# Patient Record
Sex: MALE | Race: WHITE | NOT HISPANIC OR LATINO | Employment: STUDENT | URBAN - METROPOLITAN AREA
[De-identification: names, ages, dates, MRNs, and addresses within clinical notes are randomized per-mention and may not be internally consistent; named-entity substitution may affect disease eponyms.]

---
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
## 2020-01-07 ENCOUNTER — OFFICE VISIT (OUTPATIENT)
Dept: PSYCHIATRY | Facility: CLINIC | Age: 20
End: 2020-01-07
Attending: PSYCHIATRY & NEUROLOGY
Payer: COMMERCIAL

## 2020-01-07 DIAGNOSIS — F32.9 MAJOR DEPRESSIVE DISORDER, SINGLE EPISODE: Primary | ICD-10-CM

## 2020-01-10 ENCOUNTER — TELEPHONE (OUTPATIENT)
Dept: PSYCHIATRY | Facility: CLINIC | Age: 20
End: 2020-01-10

## 2020-01-10 NOTE — TELEPHONE ENCOUNTER
Writer called patient's mother (Mona) to check up on current providers that are in place, as well as clarify the assessment process. Mona answered questions about providers. Mona wondered about only scheduling cognitive testing. Writer reiterated the need for a complete assessment and offered a referral to another provider for cognitive testing, which Mona declined.   Mona also expressed concern about length of the assessment process and getting care for the patient as soon as possible. Mona agreed to contact for scheduling the remainder of the assessment appointments after the cognitive testing on 01/17.    DIETER Treviño candidate  First Episode Psychosis- [facility]   Social Work Intern  Direct Phone: [phone]

## 2020-01-17 ENCOUNTER — OFFICE VISIT (OUTPATIENT)
Dept: PSYCHIATRY | Facility: CLINIC | Age: 20
End: 2020-01-17
Attending: PSYCHOLOGIST
Payer: COMMERCIAL

## 2020-01-17 DIAGNOSIS — F29 PSYCHOSIS, UNSPECIFIED PSYCHOSIS TYPE (H): Primary | ICD-10-CM

## 2020-01-20 ASSESSMENT — ANXIETY QUESTIONNAIRES
3. WORRYING TOO MUCH ABOUT DIFFERENT THINGS: NEARLY EVERY DAY
2. NOT BEING ABLE TO STOP OR CONTROL WORRYING: NEARLY EVERY DAY
4. TROUBLE RELAXING: NEARLY EVERY DAY
1. FEELING NERVOUS, ANXIOUS, OR ON EDGE: NEARLY EVERY DAY
7. FEELING AFRAID AS IF SOMETHING AWFUL MIGHT HAPPEN: SEVERAL DAYS
GAD7 TOTAL SCORE: 17
6. BECOMING EASILY ANNOYED OR IRRITABLE: SEVERAL DAYS
5. BEING SO RESTLESS THAT IT IS HARD TO SIT STILL: NEARLY EVERY DAY

## 2020-01-20 ASSESSMENT — PATIENT HEALTH QUESTIONNAIRE - PHQ9: SUM OF ALL RESPONSES TO PHQ QUESTIONS 1-9: 23

## 2020-01-21 ENCOUNTER — TELEPHONE (OUTPATIENT)
Dept: PSYCHIATRY | Facility: CLINIC | Age: 20
End: 2020-01-21

## 2020-01-21 ASSESSMENT — ANXIETY QUESTIONNAIRES: GAD7 TOTAL SCORE: 17

## 2020-01-21 NOTE — PROGRESS NOTES
Patient name: Julian Berg  MRN: [id number]  Date: 01/17/2020  Time: 1:00 pm - 4:30 pm  Location: [facility]  Diagnosis: XX     Reason for Referral: XX     Mental Status:                         General appearance: XX                        Mood: XX                        Cognition: Formally assessed - see below                        Orientation: XX                        Insight: XX                        Memory: Formally assessed - see below                        Psychosis: XX                        Suicidal / Homicidal Thoughts: XX     Interpretation of Findings:   Wechsler Adult Intelligence Scale - Fourth Edition (WAIS-IV):  Mr. Berg was administered the WAIS-IV to assess his overall cognitive functioning. The WAIS-IV yields five composite scores: the Verbal Comprehension index, the Perceptual Reasoning index, the Working Memory index, the Processing Speed index, and Full Scale IQ.  He was alert throughout testing, however multiple times when he found the assessment activities challenging, he mentioned  I am experiencing anxiety right now.  Furthermore, he noted that he  forgotten the answers.  He also asked for breaks when he felt anxious. He was pleasant and cooperative. It is believed that he gave his best effort. He appeared anxious during the assessment. Due to this, the writer does not believe that these results reflect Mr. Berg's true cognitive abilities and should be interpreted with caution.       Mr. Berg achieved a Full-Scale IQ (FSIQ) of 131 (95% confidence interval: 117-125), corresponding to the 92nd percentile and superior range of ability.     On the Verbal Comprehension Index, a measure of verbal reasoning abilities, verbal concept formation, and knowledge acquired from the environment, Mr. Berg achieved a score in the Superior range of ability (VCI composite score of 127; 96th percentile).  Mr. Berg performed in the Average to Above Average range of ability across tasks  that make up this composite. The tasks include the ability to answer general factual inquiries, define words, and draw conceptual similarities between words. Mr. Davilas verbal comprehension abilities are above those of the average, same-aged peers. Based on Mr. Berg's performance on this index compared to other indexes, his verbal comprehension abilities are an area of strength for him.     On the Perceptual Reasoning index, which assesses non-verbal problem solving and spatial planning abilities, Mr. Berg achieved a score in the High Average range of ability (JAKOB standard score of 111, 77th percentile). He performed in the Below Average to Above Average range of ability across tasks that make up this composite. These tasks required him to replicate a series of spatial designs using blocks, complete visual patterns based upon presented information and a task that had him select from multiple pieces to mentally create a desired target shape. Mr. Davilas perceptual reasoning abilities are above those of the average, same-aged peers.      His short-term memory ability was assessed by the Working Memory index, on which he performed in the High Average range of ability (WMI standard score of 119, 90th percentile). Mr. Berg performed in the Above Average range of ability across tasks that make up this composite. Memory ability was assessed using a task which required retention and manipulation of a string of numbers, as well as a relatively basic oral arithmetic exam.  Mr. Davilas working memory abilities are above to those of the average, same-aged peers.           Mr. Berg ability to quickly process basic visual information was measured by the Processing Speed index, on which he performed in the High Average range of ability (composite score of 111, 77th percentile).  He performed in the Average range of ability across tasks that make up this composite. These tasks required him to visually scan and  discriminate information under time constraints.  Mr. Berg processing speed abilities are comparable to those of the average, same-aged peers.      Overall, Mr. Berg's performance on the WAIS-IV fell in the Below Average to Above Average  range of ability as compared to his same-aged peers. An area of strength for Mr. Berg is his verbal comprehension abilities and concern in the area of perceptual reasoning and processing speed.    Wechsler Adult Intelligence Scale-Fourth Edition (WAIS-V):  Index/Subtest Scaled Score Index/Subtest Scaled Score             Verbal Comprehension   Perceptual Reasoning     Similarities 18 Block Design 15   Vocabulary 14 Matrix Reasoning 9   Information 12 Visual Puzzles 12    JAKOB 111   Working Memory   Processing Speed     Digit Span 14 Symbol Search  12   Arithmetic 13 Coding 12                     Full Scale       Note: Scaled scores between 8-12 are considered to be within the average range.  It is important to note that cognitive ability is considered to be one aspect of intelligence (i.e. other aspects may include interpersonal, intrapersonal, kinesthetic, musical ability, etc.); however, this type of intellectual ability is most correlated with academic performance. In addition, such measures of cognitive ability should be considered snapshots of functioning at the current time and may be influenced by development, exposure to learning, and other factors over time.  No test is a perfect measure of abilities, skills, knowledge, or behavior. Confidence intervals represent a range of scores, where if she was tested 100 times, 95 times his score would fall within this range.

## 2020-01-21 NOTE — TELEPHONE ENCOUNTER
"11:00 am  Writer called patients mother (Mona) and left message checking in. Last time writer spoke with Mona, writer agreed to check in with Mona after patient's appointment on 1/17. Writer left name and number in case Mona or Herminio have questions about anything.     11:30 am  Mona Berg called writer back and left a message requesting a return call.    3:35 pm  Writer called Mona. Mona wanted to know when the results of the cognitive testing will be available. She stated that Herminio is anxious and worried about a \"problem with his brain.\" This writer agreed to check in with the provider who conducted the cognitive testing and call Mona back either Wednesday or Friday of this week.    DIETER Treviño candidate  First Episode Psychosis- [facility]   Social Work Intern  Direct Phone: [phone]          "

## 2020-01-21 NOTE — PROGRESS NOTES
"First Episode of Psychosis   [facility]  Diagnostic Assessment  [facility]      Julian Berg MRN# [id number]   Age: 19 year old YOB: 2000     Date of Evaluation: 1/07/20  90 minute evaluation    Contributors to the Assessment   Chart Reviewed.   Interview completed with Julian Berg.  Releases of information signed by Julian for Dr. Claudia Angelo with the [facility].  Consent to communicate signed for Mother and Father (Mona and Nadeem Berg).  Collateral information obtained from Mother.    Chief Complaint   Feeling very anxious and worried, and wanting to know what is happening.    History of Present Illness    Julian Berg is a 19 year old male who prefers the name Herminio & pronouns he, him, his.  Julian presents for evaluation for First Episode of Psychosis, [facility] services for treatment of early psychosis.  Patient attended the session with his mother Mona , who they agreed to have part of the interview with.  Mona did step out for a significant portion of the interview. Discussed limits of confidentiality today and status as a mandated .     Per patient's report:   Mental health symptoms first appeared on about Nov 21st of 2019 in the form of \"spinning and worrying about things, talking to people about my worries. I missed a frat event, I've been thinking about the logistics of the school stuff and trying to make a schedule for next semester, and thinking about how I need a tech GPA and how I don't have the right kind of GPA.\" Herminio states that his symptoms have been consistent in intensity since Nov 21st, but that is has changed - \"I'm depressed now.\"  Herminio wonders if the mental health symptoms he has been experiencing are a result of \"smoking weed, or withdrawal from weed?\"    The patient reports zero lifetime psychiatric hospitalizations, but does have two ED psychiatric ED visits (11/29/19 and 12/11/2019). Patient reported " "hoped for outcomes of our assessment as \"getting rid of my depression and anxiety, and getting my cognitive functioning back.\"    Per Collateral report:    Mona (Herminio's mother) reports that Herminio \"had very intense anxiety stuff for a few weeks and wasn't maintaining relationships.\" Mona is concerned about the intensity of the mental health symptoms and wants to ensure that Herminio gets services as soon as possible, naming the start of the semester as a time line goal for getting Herminio enrolled in services.     Per medical records:    During the First-Episode Psychosis screening with [MELANIEATE SLP on 12/23/19], the following comments were made:   Julian reported he believes he has psychosis based on symptoms; symptoms reported include  memory loss, not eating/no appetite, a visual hallucination of a lamp turning on and off (one-time), irrational decisions/impulsivity, sleeping in hour-long intervals, and states he s having delusions regarding getting into business school. In reference to delusions, Julian reported  I don t know other people who were doing well then convinced themselves they were not doing well.  He denied paranoia, but confirmed being  upset about his regret  regarding school.  Julian feels his situation is very unique and  unrelatable.     According to records from [facility], Herminio was went to the ED on 12/11/2019:   \"Julian Berg is a 19 year old male who is here via parents with police escort as he was acting out and was hitting the window of their car [with his head] while he was feeling overwhelmed and distressed. Patient was at the [employer], trying to cancel a class but was unable to make a decision or accept the change. Patient is presently in his freshman year at the [employer]. He is studying computer science. He is interested in changing majors and wants to try the [employer]. Patient graduated [employer]. He was an excellent student. He has anxiety and was able to manage " "it previously. He was prescribed citalopram for several years. He admits to smoking THC on occasion over the summers but smoked more after graduating. 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 Patient ended up having heightened anxiety 2 weeks ago. He was seen at [facility] when he was feeling incapacitated and went home to stay with mother. Patient was referred for therapy which is to start next week. He saw a psychiatrist who started him on Zoloft which he only took for 2 days. Patient has been staying home with mother past 2 weeks until recently when he returned to living at the . There was talk of withdrawing and he was given a full medical withdrawal. He has been unable to accept withdrawing from school or alter his class. He felt he had to find another way. He got frustrated as it was not working out. He made suicidal threats and was hitting the car window.  Patient has now calmed down and has been in emotional and behavioral control. He exhibits distractedness and poor processing of information. He feels calm and denies that he is suicidal. He does not feel he needs to be admitted and wants to go home and back to the [facility]. I offered a trial of Lexapro for its anxiolysis and hydroxyzine for acute anxiety. Mother was concerned about a low dose antipsychotic. Patient is planning on stopping THC use. He had not used since his visit to [facility] until last night.\"     Herminio also went to the ED for psychiatric reasons on 11/29/2019. According to notes from [facility], the following was noted:   \"Julian Berg is a 19 year old male who presents to the emergency department today for a psychiatric evaluation. The patient reports recently experiencing severe anxiety over the past week. He reports being under increased stress at school while he tries to get into the computer science program at the [employer]. He admits to being in a downward spiral with all this stress and is " "having increased difficulty focusing. The patient endorses feeling overwhelmed and having poor sleep. Last night he took 3 mg of melatonin but states he still had difficulty sleeping. The possibility of being unable to get into the program scares him since he has been working so hard. The patient was previously on 40 mg citalopram for 1-2 years but took himself off of it this past June prior to school starting because he was feeling better. He has an appointment with a psychologist and psychiatrist this coming Monday, which was set up here in the emergency department by DEC. The patient does endorse smoking marijuana daily and does drink occasionally. 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 He denies any suicidal or homicidal ideation. Of note, he has Crohn's disease but has not been on any recent steroids. Patient has good support from his parents. He denies any physical health concerns today.\"     Psychiatric Review of Systems (Completed M.I.N.I. Version 7.0.2: Yes)   A. DEPRESSION  Past 2 Weeks:  low mood nearly every day, anhedonia most of the time, appetite change (increase), difficulties with sleep, low energy and difficulty concentrating, thinking or making decisions  Past Episode:  none  PHQ-9 scores:   PHQ-9 SCORE 1/20/2020   PHQ-9 Total Score 23      PHQ-9 score today, 1/07/20: NA    B. SUICIDALITY: Current: No, risk Low  -reports 0% in response to \"How likely are to you to try to kill yourself within the next 3 months on a scale from 0-100%?\"  -denies current SI, denies intent and plan  -denies current SIB/Self Injurious Behavior  -denies current HI    C. KELECHI/HYPOMANIA  Current Episode:  none  Past Episode:  none  MDQ Score: NA    D. PANIC:  unprovoked anxiety/fear, peaking in < 10 minutes, provoked anxiety/fear, heart palpitations and tremors , not clinically sufficient for Panic Disorder, better explained as Anxiety    E. AGORAPHOBIA:  none    F. SOCIAL ANXIETY:  none    G. OBSESSIVE-COMPULSIVE:  " "Reoccurring concerns, difficult to ignore or suppress. Content included regret about \"being niave about college\" and the challenge with switching to the [employer] program. Qualifies as excessive worry about a real life problem, rather than OCD.    H. TRAUMA:  none    I. ALCOHOL & J. NON-ALCOHOL:  See below    K. PSYCHOSIS:   none  Per pt: denied all psychotic symptoms    L-M. EATING DISORDER: binge eating and pt reports 5-7 instances of purging after a workout session. Pt reports feeling sick and having eaten \"a ton\" on the days that he engagd this behavior Pt feels that he may have been eating more over the past month, and not always purging.     N. GENERALIZED ANXIETY:  excessive anxiety or worry about several routine things, most days, with difficulty controling worry, feel restless, keyed up or on edge, difficulty concentrating or mind goes blank, irritability and difficulty sleeping  MARTA-7 SCORE 1/20/2020   Total Score 17     MARTA-7 score today, 1/07/20:  14 out of 21, indicating moderate anxiety.    O. RULE OUT MEDICAL, ORGANIC OR DRUG CAUSES FOR ALL DISORDERS  During any current disorder or past mood episode, patient reports:  A. Substance use or withdrawal: Yes and reports using cannabis directly before onset of distress  B. Medical illness: No    P. ANTISOCIAL PERSONALITY:  none   Other Cluster B Traits:  none    Past Psychiatric History   Past diagnoses:   Anxiety - Per patient's report     Past medication trials: See MTM visit with Ele Pandya on 2/4/20  Current medications as of 1/7/20:  -Humira, 40 mg/week for 8 years  -Zyrtec, 10 mg/day for 10+ years  -Escitalopram, 10 mg/day for 3 weeks  -Azathioprine, 100 mg/day for 1 year  -Trazadone, 50 mg/day for 1 month  -Hydroxyzine, 50 mg 3x per day for 1 month  Past medications:  -citalopram, 40 mg/day, was on for 2 years and stopped on his own without weaning off    GeneSight Genetic Testing: No     Hospitalizations and dates:  "   -[facility] 11/29/2019, ED visit  -[facility] 12/11/2019, ED visit    Commitment: No, Current Barnett order: No    Electroconvulsive Therapy (ECT) or Transcranial Magnetic Stimulation (TMS): No    Suicide attempts: No    Self-injurious behavior: Denies    Violent or aggressive behavior towards others or property: Yes - one instance reported of banging fists on car windows.    Outpatient Programs & Services:   Psychotherapy: [facility] therapist pending  Medication Mgmt:  Dr. Claudia Monk at [facility]     Substance Use History:   Alcohol- yes socially and only occasionally  Tobacco- yes, vaping socially when with friends @ 5 days a week   Cannabis- yes, 5 times a week with a few friends in the evenings     Patient reported the following problems as a result of drug use: academic and possibly the cause of his mental health symptoms.   Based on the clinical interview, there  are indications of drug or alcohol abuse. Herminio particularly wonders if the cannabis has impacted him in a negative way. He reports that he did smoke cannabis directly before the start of his current mental health symptoms, as well as an increase in anxiety when he uses cannabis in the past two months. Continue to monitor.   Discussed effect of substance use on overall health and how this may contribute to their mental health symptoms.     CD treatment hx: Patient has not received chemical dependency treatment in the past. He is currently seeing a provider who specializes in substance use disorder.     CAGE-AID was completed today, 1/07/20  C     Patient felt they ought to CUT down on your drinking (or drug use).         Social History:    Living situation: Julian  lives with his family (mom and dad and twin brother) in a home in [place]. He feels safe there and is used to it. He also has a dorm room on the [facility] [employer].    Relationships: Significant relationships include parents and twin  "brother (Mina).  Herminio reports having 5-6 good friends as well, but does not name them and wonders if some of those relationships might be interrupted by what has been happening the past two months.     Education: Julian is currently attending school;  Educational goals include switching from computer science to a program in the [employer]. Herminio is currently a freshman at [employer]. He graduated from [employer] in 2019. He reports having been a very academically successful student, with a good ACT score, straight As, and having had a small group of good friends.     Occupation: Pt reports he is unemployed due to being a student.  Occupational goals include getting a job in the field he gets a degree in. He did work at [employer] over the summer and at [employer] before that on a part time basis.    Finances: Julian  is financial supported by Family.     Spiritual considerations: Patient does not identify with deepak community. Describes their Tenriism as Presbyterian during childhood, but now is \"not very Baptist.\"    Cultural influences: Julian describes their race as white. Julian's primary spoken language is English. Julian identifies their sexual orientation as heterosexual, and their gender as male.  Julian prefers male pronouns.     Strengths & Opportunities:  Hobbies and enjoyable activities include video games, ultimate fitness, recreational sports, TV and other media, and hanging out with friends.  Exercise and nutrition habits include Moderate regular exercise program which includes aerobics and a diet with no Restrictions.  Self identified strengths are educated, intelligent and good communication skills.  Coping mechanisms include Friends and Herminio reports that this is an area of challenge, as he doesnt' have enough coping skills.     Legal Hx: No: Patient denies any legal history     Trauma and/or Abuse Hx: There are no indications or report of: significant losses, trauma, abuse or " neglect. Issues of possible neglect are not present.      Hx: No       Developmental History:   Julian was born without pregnancy or delivery complications.   Julian denies in utero substance exposure.   Julian  did meet developmental milestones on time.   Julian did not receive interventions for developmental delays.  Julian  did not require an IEP during school.          Family History:   Mental Illness History: Yes: maternal grandmother had OCD  Substance Abuse History: Yes: paternal grandfather had alcoholism  Medical conditions: Unknown  Reports history of completed suicide (great grandmother ?).         Past Medical History:    Primary Care Physician: Jairon Hawk (current med provider, is a pediatrician). Herminio is in the midst of transferring medical care to an adult provider.    History of seizures or head trauma/loss of consciousness? No  There is no problem list on file for this patient.  Medical problems: Yes - Crohn's Disease  Surgical history: No This patient has no significant past surgical history       Allergies:      Allergies   Allergen Reactions     Sesame Oil      Tree Nuts [Nuts]           Medications:     Current Outpatient Medications   Medication Sig Dispense Refill     adalimumab (HUMIRA) 40 MG/0.8ML prefilled syringe kit Inject 40 mg Subcutaneous every 7 days       azaTHIOprine 100 MG TABS Take 100 mg by mouth       cetirizine (ZYRTEC) 10 MG tablet Take 10 mg by mouth daily       diphenhydrAMINE (BENADRYL ALLERGY) 25 MG capsule Take 1 capsule (25 mg) by mouth every 6 hours as needed for itching or allergies 20 capsule 0     escitalopram (LEXAPRO) 10 MG tablet Take 1 tablet (10 mg) by mouth daily 30 tablet 0     hydrOXYzine (VISTARIL) 50 MG capsule Take 1 capsule (50 mg) by mouth 3 times daily as needed for anxiety 30 capsule 0       Most Recent Labs & Vitals (per EPIC):   There were no vitals taken for this visit.    Mental Status Exam   Alertness: oriented, groggy and  slow to respond  Attention Span and Concentration:  Normal and Easily distracted  Attitude:  cooperative   Appearance: adequately groomed and appeared stated age  Behavior/Demeanor: cooperative, with adequate eye contact   Speech: increased latency of response and normal  Language: intact and undefined difficulty. Preferred language identified as English.  Psychomotor Behavior:  no evidence of tardive dyskinesia, dystonia, or tics  Mood: depressed and anxious  Affect: mood congruent and intensity is blunted  Associations:  no loose associations  Thought Process:  circumstantial  Thought Content:  no evidence of suicidal ideation or homicidal ideation, no evidence of psychotic thought and patient denied SI, HI and any symptoms of psychosis  Perception:  Reports none;  Denies auditory hallucinations and visual hallucinations  Insight: limited  Judgment: adequate for safety  Impulse Control:  fair  Cognition: does  appear grossly intact; formal cognitive testing was not done    Safety: Without the recommended intervention, Julian is likely to experience possible increase in psychotic symptoms requiring hospitalization. In addition, there are notable risk factors for self-harm, including age, single status, anxiety, substance abuse and recent loss of educational plan. However, risk is mitigated by commitment to family, absence of past attempts, history of seeking help when needed and future oriented. Therefore, based on all available evidence including the factors cited above, Julian does not appear to be at imminent risk for self-harm, does not meet criteria for a 72-hr hold, and therefore remains appropriate for ongoing outpatient level of care.  Suicidality risk appeared Low.  The patient convincingly denies suicidality on several occasions. There was no deceit detected, and the patient presented in a manner that was believable.    Safety plan was discussed and included review of crisis phone numbers. Recommended  that patient call 911 or go to the local ED should there be a change in any of these risk factors..   CRISIS NUMBERS Emphasized:  [facility] [phone] (clinic)    [phone] (after hours)  [facility] 24/7 Julian Hernandez Mobile Team for Adults [[phone]].    Provisional Psychiatric Diagnoses     300.3 (F42) Unspecified Obsessive Compulsive and Related Disorder  Rule out: 296.23 (F32.2) MDD, Single Episode, Severe with Anxious Distress  Rule out: 300.02 (F41.1) Generalized Anxiety Disorder    Ruled out: 309.28 (F43.23) Adjustment Disorder with mixed anxiety and depressed mood    Substance-Related & Addictive Disorders 305.20 (F12.10) Cannabis Use Disorder Mild   Rule out: Cannabis withdrawal    Additionally: V62.3 Academic or educational problem significant distress about chosen major, want to change program    Herminio endorsed symptoms of anxiety (excessive anxiety or worry about several routine things, most days, with difficulty controlling worry, feel restless, keyed up or on edge, difficulty concentrating or mind goes blank, irritability and difficulty sleeping depression) and depression (low mood nearly every day, anhedonia most of the time, increased appetite, difficulties with sleep, low energy and difficulty concentrating, thinking or making decisions).     The locus of Herminio's concerns seems to be his education, and decisions about his education. He is a freshman at the [employer], and is experiencing regret about his chosen major. He is ruminating on his decisions, and possible solutions to his current regrets regarding school. This focus on his schooling situation may indicate a manifestation of OCD-like symptoms.  Reports maternal grandmother having diagnosis of OCD, indicating possible genetic loading for OCD. His depression and anxiety is significantly impacting his functioning.     Herminio was referred to the Strengths Program to assess for possible emerging psychosis. During assessment he denied any paranoia,  delusions, or hallucinations. His cognitive function results indicate over all high functionality. Herminio did exhibit significant response latency during the diagnostic assessment interview, and reports increased isolation and relationship difficulty. This may best be explained by the significant anxiety and depression. At this time there is not sufficient evidence of emerging psychosis. It would be possible that patient should continue to monitor for early prodromal symptoms.     Assessment   Julian Berg is a 19 year old single White American male with psychiatric history of anxiety and depression who presented for a comprehensive assessment of psychotic symptoms by the First Episode of Psychosis Strengths Program.  Julian was referred by self and [facility] ED staff. Julian  presented today as a rather vague historian who seemed unsure of how to respond to many questions.  Julian has Fair insight in their current circumstances. The above duration of untreated symptoms was approximately 2 months (since 11/21/19) and include marked increase in anxiety, increased difficulty maintaining relationships, increased isolation, decline in cognitive function/decision making, and increase in depression. Diagnosis of Unspecified Obsessive Compulsive and Related Disorder seems supported by patient report, collateral records, and the MINI 7.0.2.  Adjustment Disorder was considered and ruled out. Herminio's distress was precipitated by transition to college and subsequent decision that he had chosen the wrong major. The timing of the transition suggested Adjustment Disorder; this was ruled out due to the severity of anxiety and depression, as well as the duration of the symptoms approaching the 6 month christina without abating.  Differential diagnosis of MDD single episode with anxious distress or MARTA.  Further diagnostic clarification is needed.      There are medical comorbidities which impact this treatment [Crohn's Disease];  "Herminio reports this is \"well controlled at this time.\" Substance use does seem to be a present concern. He does admit the aforementioned symptoms are worse with substance use, specifically an increase in anxiety when using cannabis. Timeline of substance use and symptom presentation has been established as beginning in Sept of 2019, with acute symptoms beginning 11/21/19 directly after smoking cannabis.     Psychosocial factors impacting treatment include Occupational/Educational Difficulties, Relationship Difficulties and Phase of Life Difficulties. Psychosocial stressors were identified as mental health symptoms and occupational / vocational stress. Julian  has evidence of functional impairment including difficulty with social-strangers, social-friends and education.  Goal is to increase their functioning detailed above and assist Julian to make progress towards their goals.  Julian identified the following factors that will help them succeed in recovery include commitment to health and well being, family support, intelligence and sense of humor. Things that may interfere with their success include lacks coping skills.    Julian is currently engaged in services in the community; therapy with [facility]. Angelo with [facility].  Julian does not meet criteria for the Strengths Program.     Julian agrees to treatment with the capacity to do so. Agrees to call clinic for any problems. The patient understands to call 911 or come to the nearest ED if life threatening or urgent symptoms present.    Billing for \"Interactive Complexity\"?    No    Plan   Medication Management: Julian is in need of Medication Management, and will have an evaluation with a prescriber within the Strengths Program detailed below. Herminio should continue to follow recommendations of current medication provider.   Genesight testing has been ordered.    Psychotherapy: Julian was not interested in meeting with a therapist. Julian would benefit from " Supportive and Motivational Interviewing therapy.   Julian was and Family was  interested in participating in the [facility].   Referral information was sent to [facility]  for further coordination of individual therapy, which will also be discussed further at the Explanation of Findings visit.     Supported Employment & Education: Julian is in need of employment and education support.   Referral information was discussed via the telephone, and a referral was made. Will discuss further at the Explanation of Findings visit for further coordination of SEE support.    Case Management: Julian is not followed by a .  Case Management is not an identified need at this time. This writer will assist in short-term case management support as needed until care is established with ongoing providers.     Other Psychosocial Supports: Julian is not interested in the [facility] Young Adult Group.  Family would benefit from a Family Support Group. Groups will also be discussed further at the Explanation of Findings visit.     Medical Referrals: NA    Without the recommended intervention, Julian is likely to experience possible increase in psychotic symptoms requiring hospitalization.     Next visits include:  Cognitive testing on: 1/17/20 with Sj Cho.   Medication Therapy Management (MTM), with PharmD on: 2/4/20 with Janina Pandya.  Psychiatric and Medication Evaluation on: 2/14/20 with Jerome Yost.  Team Explanation of Findings and Recommendations visit on:  2/21/20      DIETER Treviño candidate  First Episode Psychosis- [facility]   Social Work Intern  Direct Phone: [phone]  Fax: [phone]    [facility]  [facility], 2nd floor  [address], [address]  [place] [address]    Attestation: I did not see this pt directly. This pt was discussed with me in individual clinical social work  supervision, and I agree with the plan as documented.    DIETER Cardona, [facility], February 19, 2020

## 2020-01-23 ENCOUNTER — MEDICAL CORRESPONDENCE (OUTPATIENT)
Dept: HEALTH INFORMATION MANAGEMENT | Facility: CLINIC | Age: 20
End: 2020-01-23

## 2020-01-24 ENCOUNTER — TELEPHONE (OUTPATIENT)
Dept: PSYCHIATRY | Facility: CLINIC | Age: 20
End: 2020-01-24

## 2020-01-24 NOTE — TELEPHONE ENCOUNTER
This writer called and spoke with patient s mother, Ms. Mona Berg, per her request, via message to this writer from Jesu Moreno, social work learner.  She explained that Julian has a deadline at school (this coming Monday, January 27, 2020) to decide about dropping his three classes he is currently taking at [employer]  without financial or academic consequences.  They are trying to decide as a family how to move forward and are curious about where he is at cognitively currently.  This writer explained to Ms. Berg that the results of his psychological testing are not complete at this time as he completed testing one week ago on 1/17/2020. Also explained what that FEP does a team-based care model and that his testing results are best understood with the additional information from the other team members.  Based on the time crunch with classes and his current situation, Ms. Berg was informed that from the preliminary findings, he appears to be doing well in all cognitive areas measured (working memory, processing speed, perceptual reasoning and verbal comprehension) and there are no notable areas of concern at this time.  His cognitive testing results will be explained in full (along with his other measures) during the 2/21/2020 team findings visit.  She was also encouraged to bring up any concerns and/or questions at this team findings visit.

## 2020-01-24 NOTE — PROGRESS NOTES
"Patient name: Julian Berg  MRN: [id number]  Date: 01/17/2020  Time: 1:00 pm - 4:30 pm  Location: [facility]  Diagnosis: Psychosis, unspecified psychosis type     Reason for Referral: Mr. Berg presented for a comprehensive assessment of psychotic symptoms by the First Episode of Psychosis [facility]. He  was referred by [facility] ED staff. He has a history of anxiety and depressive symptoms. In November 21, 2019, he experienced increase in anxiety and depressive symptoms, increased difficulty maintaining relationships, increased isolation, and decline in cognitive function/decision making. Mr. Berg wondered if the mental health symptoms he has been experiencing are a result of \"smoking weed, or withdrawal from weed.\"      Mental Status:                         General appearance: Casually dressed, appropriately groomed                        Mood: Anxious                        Cognition: Formally assessed - see below                        Orientation: x3                        Insight: Above Average                        Memory: Formally assessed - see below                        Psychosis: See above description                        Suicidal / Homicidal Thoughts: Denied     Interpretation of Findings:   Wechsler Adult Intelligence Scale - Fourth Edition (WAIS-IV):  Mr. Berg was administered the WAIS-IV to assess his overall cognitive functioning. The WAIS-IV yields five composite scores: the Verbal Comprehension index, the Perceptual Reasoning index, the Working Memory index, the Processing Speed index, and Full Scale IQ.  He was alert throughout testing, however multiple times when he found the assessment activities challenging, he mentioned  I am experiencing anxiety right now.  Furthermore, he noted that he had  forgotten the answers.  He also asked for breaks when he felt anxious. He was pleasant and cooperative. It is believed that he gave his best effort. He appeared anxious " during the assessment. Due to this, the writer does not believe that these results reflect Mr. Berg's true cognitive abilities and should be interpreted with caution.       Mr. Berg achieved a Full-Scale IQ (FSIQ) of 131 (95% confidence interval: 117-125), corresponding to the 92nd percentile and superior range of ability.     On the Verbal Comprehension Index, a measure of verbal reasoning abilities, verbal concept formation, and knowledge acquired from the environment, Mr. Berg achieved a score in the Superior range of ability (VCI composite score of 127; 96th percentile).  Mr. Berg performed in the Average to Above Average range of ability across tasks that make up this composite. The tasks include the ability to answer general factual inquiries, define words, and draw conceptual similarities between words. Mr. Davilas verbal comprehension abilities are above those of the average, same-aged peers. Based on Mr. Berg's performance on this index compared to other indexes, his verbal comprehension abilities are an area of strength for him.     On the Perceptual Reasoning index, which assesses non-verbal problem solving and spatial planning abilities, Mr. Berg achieved a score in the High Average range of ability (JAKOB standard score of 111, 77th percentile). He performed in the Average to Above Average range of ability across tasks that make up this composite. These tasks required him to replicate a series of spatial designs using blocks, complete visual patterns based upon presented information and a task that had him select from multiple pieces to mentally create a desired target shape. Mr. Davilas perceptual reasoning abilities are above those of the average, same-aged peers.      His short-term memory ability was assessed by the Working Memory index, on which he performed in the High Average range of ability (WMI standard score of 119, 90th percentile). Mr. Berg performed in the Above Average range  of ability across tasks that make up this composite. Memory ability was assessed using a task which required retention and manipulation of a string of numbers, as well as a relatively basic oral arithmetic exam.  Mr. Davilas working memory abilities are above to those of the average, same-aged peers.           Mr. Berg ability to quickly process basic visual information was measured by the Processing Speed index, on which he performed in the High Average range of ability (composite score of 111, 77th percentile).  He performed in the Average range of ability across tasks that make up this composite. These tasks required him to visually scan and discriminate information under time constraints.  Mr. Berg processing speed abilities are comparable to those of the average, same-aged peers.      Overall, Mr. Davilas performance on the WAIS-IV fell in the High Average to Superior range of ability as compared to his same-aged peers. An area of strength for Mr. Berg is his verbal comprehension abilities. There are no notable areas of concern at this time.    Wechsler Adult Intelligence Scale-Fourth Edition (WAIS-V):  Index/Subtest Scaled Score Index/Subtest Scaled Score             Verbal Comprehension   Perceptual Reasoning     Similarities 18 Block Design 15   Vocabulary 14 Matrix Reasoning 9   Information 12 Visual Puzzles 12    JAKOB 111   Working Memory   Processing Speed     Digit Span 14 Symbol Search  12   Arithmetic 13 Coding 12                     Full Scale       Note: Scaled scores between 8-12 are considered to be within the average range.  It is important to note that cognitive ability is considered to be one aspect of intelligence (i.e. other aspects may include interpersonal, intrapersonal, kinesthetic, musical ability, etc.); however, this type of intellectual ability is most correlated with academic performance. In addition, such measures of cognitive ability should be  considered snapshots of functioning at the current time and may be influenced by development, exposure to learning, and other factors over time.  No test is a perfect measure of abilities, skills, knowledge, or behavior. Confidence intervals represent a range of scores, where if she was tested 100 times, 95 times his score would fall within this range.     [place] Multiphasic Personality Inventory-Restructured Form    Mr. Berg completed the MMPI-RF, an objective measure of personality functioning. Mr. Berg produced an overall valid profile, suggesting he answered consistently and attended appropriately to the item content. His results suggest difficulty concentrating and confusion. Also, he reports being passive and indecisiveness. His profile indicates that he believes he is incapable of coping with current difficulties. Finally, he endorses above-average level of stress and worry.     Doherty Anxiety Inventory    Doherty Anxiety Inventory (MU) is a self-report measure that is used to assess the severity of patient's anxiety within the last week. Mr. Davilas score on the MU was 16 out of a total maximum score of 63. Ms. Berg s score on the MU was in the low anxiety range.      I met this patient and was present for and participated in the Mental Status Exam portion of the visit.  I agree with the plan as documented.  Taylor Haynes Psy.D., L.P., 01/31/2020.    Psychological testing was administered on 01/17/2020 by Ivanna Cho, Doctoral Practicum Student/Psychology Learner, under my direct supervision.  Total time spent in test administration and scoring by Clinical Trainee was 4 hours (180 minutes administering, 30 minutes scoring and 30 minutes interpreting). See Above Clinical Trainee Note for testing details.  Taylor Haynes Psy.D., L.P. 01/31/2020.

## 2020-01-24 NOTE — TELEPHONE ENCOUNTER
Mona called writer to discuss referral to SEE. Mona and Herminio agreed, and writer sent referral request to Raghu Watters.     DIETER Treviño candidate  First Episode Psychosis- [facility]   Social Work Intern  Direct Phone: [phone]

## 2020-01-24 NOTE — TELEPHONE ENCOUNTER
Called and LVM for patient's mother re: questions about cognitive testing completed with this writer's student on 1/17/2020 in clinic.  Provided general clinic call back number.  Will try calling again later today.

## 2020-02-04 ENCOUNTER — OFFICE VISIT (OUTPATIENT)
Dept: PHARMACY | Facility: CLINIC | Age: 20
End: 2020-02-04
Payer: COMMERCIAL

## 2020-02-04 DIAGNOSIS — F41.9 ANXIETY: ICD-10-CM

## 2020-02-04 DIAGNOSIS — F32.9 MDD (MAJOR DEPRESSIVE DISORDER): Primary | ICD-10-CM

## 2020-02-04 DIAGNOSIS — K50.90 CROHN'S DISEASE (H): ICD-10-CM

## 2020-02-04 PROCEDURE — 99605 MTMS BY PHARM NP 15 MIN: CPT | Performed by: PHARMACIST

## 2020-02-04 PROCEDURE — 99607 MTMS BY PHARM ADDL 15 MIN: CPT | Performed by: PHARMACIST

## 2020-02-04 RX ORDER — LACTOBACILLUS RHAMNOSUS GG 10B CELL
1 CAPSULE ORAL 2 TIMES DAILY
Status: ON HOLD | COMMUNITY
End: 2021-01-15

## 2020-02-04 RX ORDER — EPINEPHRINE 0.3 MG/.3ML
0.3 INJECTION SUBCUTANEOUS PRN
Status: ON HOLD | COMMUNITY
End: 2021-01-15

## 2020-02-04 NOTE — Clinical Note
Gerard Cano, Routing this FEP pt to you solely as an FYI. No pressing med issues. You see him 2/14 and we can discuss in huddle. Thanks!Ele

## 2020-02-04 NOTE — PROGRESS NOTES
"SUBJECTIVE/OBJECTIVE:                           Julian Berg is a 19 year old male coming in for an initial visit for Medication Therapy Management.  He was referred to me from First Episode Psychosis (FEP)- [facility].    Chief Complaint: FEP medication review.     Allergies/ADRs: Reviewed in Epic  Tobacco:  reports that he is a non-smoker but has been exposed to tobacco smoke. He has quit using smokeless tobacco.  Alcohol: did not discuss, see DA  PMH: Reviewed with pt    Medication Adherence/Access:  No current issues.   H/o self-discontinuing medication    MDD, anxiety:   Current therapy:   Lexapro 15mg daily. Increased from 10mg ~1/27/20.   Hydroxyzine 50mg PRN. Pt takes <1/week  Melatonin 3mg QHS, decreased from 6mg ~1 week ago    Julian is here today for continuation of FEP intake and assessment. He was seen on 1/7/20 for Diagnostic Assessment with TAYLOR Treviño and on 1/17/20 for psychological testing with Sj Cho/Taylor Haynes PsyD, DANIKA. He will have his initial visit with psychiatry provider, Jerome Yost MD on 2/14/20. Please see notes from these providers for details regarding symptoms, history, and diagnostic impressions.     Today, pt endorses anxiety and depression symptoms. He feels his anxiety has improved somewhat with change in environment/school stressors and possibly due in part to Lexapro initiation. He describes his mood as depressed with symptoms of low motivation, low energy \"I don't want to get out of bed\", and trouble with focus and concentration. He feels cognitively slower and gives examples of some difficulty writing papers for class (takes longer, feels he is searching for the right words). He wonders if these symptoms are due to Lexapro, but also reflects that this symptom may have proceeded Lexapro therapy. Finally he wonders about timeline of efficacy for antidepressants.     Past medications:   - Citalopram took for 1-2 years, stopped May/June 2019.  " Conflicting information from pt/mom regarding efficacy. Mom feels efficacy wore off, pt feels it was efficacious and he discontinued it d/t feeling like he no longer needed it.   - Zoloft pt took <1 week Nov/Dec 2019. Self-discontinued d/t not wanting to have to take a medication  Lexapro Dec 2019-present  trazodone PRN (no longer needs)     Chron's Disease:   Currently taking Humira 40mg Q7 days, azathioprine 100mg daily and probiotic.   Pt is working with GI specialist, no active symptoms currently. Mom wonders if Humira or azathioprine could contribute to symptoms of depression or anxiety.       ASSESSMENT:                            Medication Adherence: currently good    MDD, anxiety:   Pt did not endorse any psychosis symptoms today. Thought disorder vs mood symptoms will be further explored through diagnostic assessments with Jerome Yost MD and TAYLOR Treviño.   Herminio does endorse symptoms of depression and anxiety. He has been taking Lexapro for ~6 weeks with perhaps partial benefit. Dose was increased to 15mg 1 week ago. Reviewed timeline for efficacy with pt, and that it is likely too soon to see benefit from dose increase.  Suspect difficulty concentrating and perceived cognitive slowing are symptoms of pt's depression/anxiety as opposed to Lexapro side effects. Herminio is currently willing to continue Lexapro therapy at this time, but would be interested in trying an alternate agent if symptoms don't improve.     Chron's Disease: Pt to continue working with GI for medication management. No known association between Humira or azathioprine and depression/ anxiety/psychosis symptoms.       PLAN:                            1. Med rec completed today.   2. Pt/mom interested in genetic testing. Provided information on 2 options in clinic (pharmacogenetic testing [facility] study and [employer]).   3. Pt to continue Lexapro 15mg daily. Initial visit with psychiatry provider, Jerome Yost MD is scheduled 2/14/20      I  spent 60 minutes with this patient today. A copy of the visit note was provided to the patient's FEP provider.    Will follow up 2/21/20 FEP Findings Visit.    The patient declined a summary of these recommendations as an after visit summary.     Ele Pandya, PharmD, BCPP  Medication Therapy Management Pharmacist  [facility]  [phone]

## 2020-02-12 NOTE — PROGRESS NOTES
"     [facility]  First Episode Psychosis Program  Psychiatry Clinic  Medication Evaluation     Referred by self for evaluation of possible psychosis  .     History was provided by patient who was a good historian.    CARE TEAM:  PCP- Jairon Hawk   Therapist- Dr. Angelo with [facility]    Julian Berg is a 19 year old male who prefers the name Herminio & pronouns he, him.      CHIEF COMPLAINT                                                           \" anxiety \"     HISTORY OF PRESENT ILLNESS   [4, 4]      Pertinent Background:  Previous diagnoses include depression, anxiety, and Crohn's.  Notably, patient grew up in an intact family, with no trauma hx, and excelled educationally up until college.    Psych critical item history includes SUBSTANCE USE: cannabis.    .   Most recent history began Sept 2019 when patient started college, joined a fraternity, and began smoking MJ daily. He began experiencing significant anxiety about school performance, prompting two ED visits 11/2019 and 12/2019. [facility]. Was started on Lexapro 10 mg about 4 weeks ago by PCP. Increased to 15 mg 3 weeks ago.  - brain fog, word-finding difficulty, impaired memory and concentration since Nov 2019. Worsened over the past 6 weeks.  - depressed mood since late Dec. Low motivation, slowed thinking, anhedonia, initial and secondary insomnia, increased appetite.  - Existential thoughts, \"Does my life matter?\" Denies SI.  - Last fall during anxiety exacerbation patient/family decided to drop a class, after which patient experienced intense regret leading to physical agitation, hit head against a car window and presented to ED. No LOC.    RECENT PSYCH ROS:   Depression:  depressed mood, anhedonia, insomnia and appetite changes  Elevated:  none  Psychosis:  none  Anxiety:  excessive worry and feeling fearful  Trauma Related:  none  Dysregulation:  none  Eating Disorder: no   "   Pertinent Negative Symptoms:  NO suicidal ideation, self-injurious behavior/urges, violent ideation, aggression, psychosis, hallucinations, delusions and jerri       RECENT SUBSTANCE USE:     Alcohol- usually 2-8 drinks in a sitting on weekends only, last weekend had 7-8 drinks and blacked out  Tobacco- vapes nearly daily  Cannabis- daily use last semester, cut back at start of this semester such that he has used only 2x in past 2 mos  Caffeine- occasional    CURRENT SOCIAL HISTORY:  Financial support-  family or friend  Children-  none  Living situation- dorm at [employer]  Social/spiritial support-  family  Feels safe at home-  Yes     PSYCHIATRIC HISTORY                           SIB- 12/2019 hit head against a car window during a time of extreme anxiety  Suicidal Ideation Hx- no  Suicide Attempt- #- no, most recent- N/A    Violence/Aggression Hx- no  Psychosis Hx- no  Eating Disorder Hx- no    Psych Hosp- #- no, most recent- N/A   Commitment- no  ECT- no  Outpatient Programs - no    Past Psychotropic Med Trials- see next section    PAST MED TRIALS                                              Medication  Dose (mg) Effect  Dates of Use   citalopram 40 Not effective for anxiety while in high school 2018 - 2019   escitalopram 10 tbd 1/2020 - present           SUBSTANCE USE HISTORY                        Past Use- in highschool used cannabis less frequently  Treatment- # none, most recent- N/A  Medical Consequences- no  HIV/Hepatitis- no  Legal Consequences- no    SOCIAL and FAMILY HISTORY      [1ea, 1ea]       patient reported                    Financial Support- if known, documented above  Family/ Children/ Relationships- if known, documented above  Living Situation- if known, documented above  Cultural/ Social/ Spiritual Support- if known, documented above    Trauma History (self-report)- no  Legal- no  Early History/Education-  See DA by Jesu Moreno 1/2020.    FAMILY MENTAL HEALTH HX-  Mental Illness History: Yes:  maternal grandmother had OCD  Substance Abuse History: Yes: paternal grandfather had alcoholism  Medical conditions: Unknown  Reports history of completed suicide (great grandmother ?).    MEDICAL / SURGICAL HISTORY         Pregnant or breastfeeding- N/A      Contraception- no    Patient Active Problem List   Diagnosis     Psychosis, unspecified psychosis type (H)        Major Surgery- no    MEDICAL REVIEW OF SYSTEMS    [2, 10]     A comprehensive review of systems was performed and is negative other than noted in the HPI.    ALLERGY      Nuts  MEDICATIONS          Current Outpatient Medications   Medication Sig Dispense Refill     adalimumab (HUMIRA) 40 MG/0.8ML prefilled syringe kit Inject 40 mg Subcutaneous every 7 days       azaTHIOprine 100 MG TABS Take 100 mg by mouth daily        cetirizine (ZYRTEC) 10 MG tablet Take 10 mg by mouth daily       diphenhydrAMINE (BENADRYL ALLERGY) 25 MG capsule Take 1 capsule (25 mg) by mouth every 6 hours as needed for itching or allergies 20 capsule 0     EPINEPHrine (EPIPEN/ADRENACLICK/OR ANY BX GENERIC EQUIV) 0.3 MG/0.3ML injection 2-pack Inject 0.3 mg into the muscle as needed for anaphylaxis       Escitalopram Oxalate (LEXAPRO PO) Take 15 mg by mouth daily       hydrOXYzine (VISTARIL) 50 MG capsule Take 1 capsule (50 mg) by mouth 3 times daily as needed for anxiety 30 capsule 0     lactobacillus rhamnosus, GG, (CULTURELL) capsule Take 1 capsule by mouth 2 times daily       melatonin 3 MG CAPS Take 3 mg by mouth At Bedtime       traZODone (DESYREL) 50 MG tablet Take 50 mg by mouth At Bedtime       VITALS       [3, 3]   /63   Pulse 62   Wt 83.9 kg (185 lb)   BMI 23.12 kg/m     MENTAL STATUS EXAM     [9, 14 cog gs]     Alertness: alert   Appearance: casually groomed  Behavior/Demeanor: cooperative and pleasant, with fair  eye contact   Speech: increased latency of response and slowed  Language: intact  Psychomotor: slowed  Mood: depressed  Affect: restricted; was  congruent to mood; was congruent to content  Thought Process/Associations: unremarkable  Thought Content:  Reports none;  Denies suicidal and violent ideation and delusions  Perception:  Reports none;  Denies auditory hallucinations and visual hallucinations  Insight: adequate  Judgment: adequate for safety  Cognition: (6) oriented: time, person, and place  attention span: fair  concentration: fair  recent memory: intact  remote memory: intact  fund of knowledge: appropriate  Gait and Station: unremarkable    LABS and DATA     AIMS:  N/A    PHQ9 TODAY = not completed  PHQ-9 SCORE 1/20/2020   PHQ-9 Total Score 23     Results for orders placed or performed in visit on 02/14/20   Vitamin D Deficiency     Status: None   Result Value Ref Range    Vitamin D Deficiency screening 35 20 - 75 ug/L   TSH with free T4 reflex     Status: None   Result Value Ref Range    TSH 1.60 0.40 - 4.00 mU/L   CBC with Platelets     Status: Abnormal   Result Value Ref Range    WBC 3.2 (L) 4.0 - 11.0 10e9/L    RBC Count 4.45 4.4 - 5.9 10e12/L    Hemoglobin 14.4 13.3 - 17.7 g/dL    Hematocrit 42.4 40.0 - 53.0 %    MCV 95 78 - 100 fl    MCH 32.4 26.5 - 33.0 pg    MCHC 34.0 31.5 - 36.5 g/dL    RDW 12.6 10.0 - 15.0 %    Platelet Count 173 150 - 450 10e9/L       PSYCHOTROPIC DRUG INTERACTIONS     Lexpro and trazodone have additive serotonergic effects and may increase the risk of serotonin syndrome.    MANAGEMENT:  Monitoring for adverse effects, routine vitals and patient is aware of risks    RISK STATEMENT for SAFETY     Julian ALBERTO Berg did not appear to be an imminent safety risk to self or others.    PSYCHIATRIC DIAGNOSES                                           Unspecified depressive d/o, current episode mild (r/o substance-induced depression)  Unspecified anxiety (r/o substance-induced anxiety)  R/o cannabis use d/o  R/o EtOH use d/o    ASSESSMENT    [m2, h3]     TODAY  20 yo with hx of anxiety and Crohn's disease who experienced an anxiety  exacerbation that corresponded temporally to transitioning to college and increasing the frequency of his MJ use to daily. This anxiety exacerbation has now mostly resolved, and over past 2 mos has evolved into a major depressive episode. Anxiety consisted of mental worries as well as physical symptoms. There was high baseline anxiety as well as spikes that peaked in minutes, however not enough physical symptoms to categorize as panic attack. Patient was able to drastically reduce the frequency of his MJ use starting around the holidays, with a brisk reduction in anxiety thereafter. Depressive symptoms increased around that time, and have not improved at all 3 weeks into Lexapro 15 mg trial. Prominent symptoms include depressed mood, anhedonia, and slowed cognition. MSE notable for increased latency and psychomotor slowing. There are no psychotic symptoms, and no safety concerns including no SI or HI. Patient's depression and anxiety were not well-treated historically on citalopram 40 mg by pt/family report, so anticipate that Lexapro will not be helpful either. Today counseled pt to eliminate remaining MJ use, and to drastically reduce or eliminate his unsafe levels of EtOH use. Checked labs, which were wnl. Pt could benefit from Vit D supplementation for optimal mood support. Discussed other lifestyle modifications to target depression (regular exercise). Would consider cross-tapering from Lexapro to Zoloft to target depression.     PLAN        [m2, h3]     1) PSYCHOTROPIC MEDICATIONS: prescribed by PCP, changes to be discussed at findings visit  - Lexapro 15 mg, consider taper to Zoloft  - hydroxyzine 50 mg TID PRN anxiety (not using)  - trazodone 50 mg at bedtime PRN insomnia (not using)  - melatonin 3 mg at bedtime  - consider adding Vitamin D 2000 international units daily    2) [place]  was not [place] today:  not using controlled substances.    3) THERAPY:    continue    4) NEXT DUE:    none    5) REFERRALS:     N/A     6) RTC: n/a, patient is not a good candidate for First Episode Psychosis Program and is appropriate for continued management w/ PCP    7) CRISIS NUMBERS:   Provided routinely in AVS    TREATMENT RISK STATEMENT:  The risks, benefits, alternatives and potential adverse effects have been discussed and are understood by the pt. The pt understands the risks of using street drugs or alcohol. There are no medical contraindications, the pt agrees to treatment with the ability to do so. The pt knows to call the clinic for any problems or to access emergency care if needed.  Medical and substance use concerns are documented above.  Psychotropic drug interaction check was done, including changes made today.    PROVIDER: Jairon Yost MD    Patient not staffed in clinic. Supervisor is Dr. Silverman, who will review and sign the note.    I did not see this pt directly. I have reviewed the documentation, and I agree with the resident's plan of care.    Candelaria Silverman MD

## 2020-02-14 ENCOUNTER — OFFICE VISIT (OUTPATIENT)
Dept: PSYCHIATRY | Facility: CLINIC | Age: 20
End: 2020-02-14
Attending: PSYCHIATRY & NEUROLOGY
Payer: COMMERCIAL

## 2020-02-14 VITALS
SYSTOLIC BLOOD PRESSURE: 119 MMHG | DIASTOLIC BLOOD PRESSURE: 63 MMHG | HEART RATE: 62 BPM | WEIGHT: 185 LBS | BODY MASS INDEX: 23.12 KG/M2

## 2020-02-14 DIAGNOSIS — Z79.899 ENCOUNTER FOR LONG-TERM (CURRENT) USE OF MEDICATIONS: Primary | ICD-10-CM

## 2020-02-14 LAB
ERYTHROCYTE [DISTWIDTH] IN BLOOD BY AUTOMATED COUNT: 12.6 % (ref 10–15)
HCT VFR BLD AUTO: 42.4 % (ref 40–53)
HGB BLD-MCNC: 14.4 G/DL (ref 13.3–17.7)
MCH RBC QN AUTO: 32.4 PG (ref 26.5–33)
MCHC RBC AUTO-ENTMCNC: 34 G/DL (ref 31.5–36.5)
MCV RBC AUTO: 95 FL (ref 78–100)
PLATELET # BLD AUTO: 173 10E9/L (ref 150–450)
RBC # BLD AUTO: 4.45 10E12/L (ref 4.4–5.9)
TSH SERPL DL<=0.005 MIU/L-ACNC: 1.6 MU/L (ref 0.4–4)
WBC # BLD AUTO: 3.2 10E9/L (ref 4–11)

## 2020-02-14 PROCEDURE — G0463 HOSPITAL OUTPT CLINIC VISIT: HCPCS | Mod: ZF

## 2020-02-14 PROCEDURE — 36415 COLL VENOUS BLD VENIPUNCTURE: CPT | Performed by: STUDENT IN AN ORGANIZED HEALTH CARE EDUCATION/TRAINING PROGRAM

## 2020-02-14 PROCEDURE — 82306 VITAMIN D 25 HYDROXY: CPT | Performed by: STUDENT IN AN ORGANIZED HEALTH CARE EDUCATION/TRAINING PROGRAM

## 2020-02-14 PROCEDURE — 84443 ASSAY THYROID STIM HORMONE: CPT | Performed by: STUDENT IN AN ORGANIZED HEALTH CARE EDUCATION/TRAINING PROGRAM

## 2020-02-14 PROCEDURE — 85027 COMPLETE CBC AUTOMATED: CPT | Performed by: STUDENT IN AN ORGANIZED HEALTH CARE EDUCATION/TRAINING PROGRAM

## 2020-02-14 RX ORDER — TRAZODONE HYDROCHLORIDE 50 MG/1
50 TABLET, FILM COATED ORAL AT BEDTIME
COMMUNITY
End: 2020-03-06

## 2020-02-14 ASSESSMENT — PAIN SCALES - GENERAL: PAINLEVEL: NO PAIN (0)

## 2020-02-14 NOTE — NURSING NOTE
Chief Complaint   Patient presents with     Eval/Assessment     Psychosis, unspecified psychosis type

## 2020-02-14 NOTE — PATIENT INSTRUCTIONS
Thank you for coming to the PSYCHIATRY CLINIC.    Lab Testing:  If you had lab testing today and your results are reassuring or normal they will be mailed to you or sent through [facility] within 7 days.   If the lab tests need quick action we will call you with the results.  The phone number we will call with results is # [phone] (home) . If this is not the best number please call our clinic and change the number.    Medication Refills:  If you need any refills please call your pharmacy and they will contact us. Our fax number for refills is [phone]. Please allow three business for refill processing.   If you need to  your refill at a new pharmacy, please contact the new pharmacy directly. The new pharmacy will help you get your medications transferred.     Scheduling:  If you have any concerns about today's visit or wish to schedule another appointment please call our office during normal business hours [phone] (8-5:00 M-F)    Contact Us:  Please call [phone] during business hours (8-5:00 M-F).  If after clinic hours, or on the weekend, please call  [phone].    Financial Assistance [phone]  [facility] Billing [phone]  Central Billing Office, [facility]: [phone]  [facility] Billing [phone]  Medical Records [phone]      MENTAL HEALTH CRISIS NUMBERS:  [facility]:   [facility] - [phone]   Crisis Residence [facility] - [phone]   Walk-In Counseling [facility] [phone]   COPE 24/7 [facility] Mobile Team for Adults - [[phone]]; Child - [[phone]]        [facility]:   [facility] - [phone]   Walk-in counseling [facility] - [phone]   Walk-in counseling [facility] - [phone]   Crisis Residence [facility] - [phone]   Urgent Care Adult Mental Health:   --Drop-in, 24/7 crisis line, and [place] Mobile Team  [[phone]]    CRISIS TEXT LINE: Text [phone] from anywhere, anytime, any crisis 24/7;    OR SEE www.crisistextline.org     National Suicide Prevention Lifeline: 323-832-PAPO (650-277-1397)    Poison Control Center - [phone]    CHILD: [facility] needs assessment team - [phone]     [facility] Lifeline - 1-301.743.7725; or [facility] Lifeline - 1-943.794.5828    If you have a medical emergency please call 911or go to the nearest ER.                    _____________________________________________    Again thank you for choosing PSYCHIATRY CLINIC and please let us know how we can best partner with you to improve you and your family's health.  You may be receiving a survey regarding this appointment. We would love to have your feedback, both positive and negative. The survey is done by an external company, so your answers are anonymous.

## 2020-02-16 LAB — DEPRECATED CALCIDIOL+CALCIFEROL SERPL-MC: 35 UG/L (ref 20–75)

## 2020-02-18 ENCOUNTER — TELEPHONE (OUTPATIENT)
Dept: PSYCHIATRY | Facility: CLINIC | Age: 20
End: 2020-02-18

## 2020-02-18 NOTE — TELEPHONE ENCOUNTER
Writer returned a call from Herminio's mother asking to schedule a time to come in a get the Genesight swab. If the testing results can be processed by Friday's meeting, they would like to do that. If it couldn't be processed by Friday's meeting, they are fine waiting until Friday to do the swab.   DIETER Treviño candidate  First Episode Psychosis- Strengths Program   Social Work Intern  Direct Phone: [phone]

## 2020-02-19 ENCOUNTER — TELEPHONE (OUTPATIENT)
Dept: PSYCHIATRY | Facility: CLINIC | Age: 20
End: 2020-02-19

## 2020-02-19 NOTE — TELEPHONE ENCOUNTER
Writer spoke with Mona (Herminio's mother) and confirmed that the genetesting results take 7-10 days to process. Mona agreed that they could schedule a nurse visit for right after the meeting on 2/21 (ending at 12:30).    DIETER Treviño candidate  First Episode Psychosis- Strengths Program   Social Work Intern  Direct Phone: [phone]

## 2020-02-19 NOTE — TELEPHONE ENCOUNTER
On 1/7/2020 the patient signed a PHI authorizing person to person communication with José Miguel Berg for scheduling, medical ,and billing information.  I sent this document to scanning on 2/19/2020 and kept a copy in Psychiatry until scanning is confirmed. Serina Castellon CMA    On 1/7/2020 the patient signed an NANDA authorizing medical records to be released from Dr. Alejandra[facility] to [facility] for the purpose of continuing care. I faxed the request to [phone]. I sent the original to NANDA to scanning and kept a copy in psychiatry until scanning is complete.  Rosaura Catsellon CMA

## 2020-02-21 ENCOUNTER — TRANSFERRED RECORDS (OUTPATIENT)
Dept: HEALTH INFORMATION MANAGEMENT | Facility: CLINIC | Age: 20
End: 2020-02-21

## 2020-02-21 ENCOUNTER — MEDICAL CORRESPONDENCE (OUTPATIENT)
Dept: HEALTH INFORMATION MANAGEMENT | Facility: CLINIC | Age: 20
End: 2020-02-21

## 2020-02-21 ENCOUNTER — OFFICE VISIT (OUTPATIENT)
Dept: PSYCHIATRY | Facility: CLINIC | Age: 20
End: 2020-02-21
Attending: PSYCHOLOGIST
Payer: COMMERCIAL

## 2020-02-21 ENCOUNTER — APPOINTMENT (OUTPATIENT)
Dept: PSYCHIATRY | Facility: CLINIC | Age: 20
End: 2020-02-21
Payer: COMMERCIAL

## 2020-02-21 ENCOUNTER — OFFICE VISIT (OUTPATIENT)
Dept: PHARMACY | Facility: CLINIC | Age: 20
End: 2020-02-21
Payer: COMMERCIAL

## 2020-02-21 VITALS
BODY MASS INDEX: 22.75 KG/M2 | SYSTOLIC BLOOD PRESSURE: 107 MMHG | WEIGHT: 182 LBS | DIASTOLIC BLOOD PRESSURE: 68 MMHG | HEART RATE: 59 BPM

## 2020-02-21 DIAGNOSIS — F33.1 MODERATE EPISODE OF RECURRENT MAJOR DEPRESSIVE DISORDER (H): Primary | ICD-10-CM

## 2020-02-21 DIAGNOSIS — F32.9 MDD (MAJOR DEPRESSIVE DISORDER): Primary | ICD-10-CM

## 2020-02-21 PROCEDURE — 99207 ZZC NO CHARGE LOS: CPT | Performed by: PHARMACIST

## 2020-02-21 PROCEDURE — G0463 HOSPITAL OUTPT CLINIC VISIT: HCPCS | Mod: ZF

## 2020-02-21 RX ORDER — ESCITALOPRAM OXALATE 10 MG/1
TABLET ORAL
Qty: 11 TABLET | Refills: 0 | Status: SHIPPED | OUTPATIENT
Start: 2020-02-21 | End: 2020-04-10

## 2020-02-21 ASSESSMENT — PAIN SCALES - GENERAL: PAINLEVEL: NO PAIN (0)

## 2020-02-21 NOTE — PROGRESS NOTES
First Episode Psychosis   Strengths Program  Explanation of Findings Visit  [facility]    Patient Name:  Julian Berg  /Age:  2000 (19 year old)  Initial Diagnosis(es):  296.32 (F33.1) Major Depressive Disorder, Recurrent Episode, Moderate With anxious distress  300.00 (F41.9) Unspecified Anxiety Disorder (possibily secondary to MDD, r/o MARTA)  Patient Active Problem List    Diagnosis Date Noted     Psychosis, unspecified psychosis type (H) 2020     Priority: Medium     Diagnostic Assessment Completed: 2020; please see in chart review for details  Cognitive Testing Completed: 2020; Please see in chart review for details    Date of Findings Meetin20  Length of Actual Contact: 60 minutes  Start time: 11:30  End time: 12:30    Type of contact:   Explanation of Findings    Individuals Present:    Client: Yes  Significant Other/Family/Friend: Mother and Father  Prescriber(s): Jairon Yost MD  Psychologist: Ivanna Cho MA   & Individual Resiliency Trainer (IRT): Jesu Moreno  Pharmacist: Ele Pandya PharmD      Total number of people who participated in contact: seven, which includes the clinical team    Interventions:  >[place] announced at beginning of session  >Review of each team member's role   >Review of diagnosis, symptoms to substantiate the diagnosis, and affected level of functioning  >Interpretation and explanation of results of psychological testing  >Review of medications  >Review of goals and associated strengths, barriers, objectives, and interventions  >Review of safety risks  (ie SI and HI) and characteristics and interventions to mitigate risk  >Advice given as to how interpersonal supports can best assist the patient's recovery  >Explored aspects of family history/dynamics  >Explored pt/family understanding of, and adjustment to psychosis / illness  >Review of frequency of appointments and anticipated length of  treatment  >Illicit client/family feedback    Assessment:  The above named individuals met for the purposes of reviewing the findings of the diagnostic assessment, Psychological & Cognitive testing, and Psychiatric consultation recently completed.     Per Psychiatric consultation:   Julian Berg is a 19 year old year old male with hx of anxiety and Crohn's disease. Today, The patient denies side effects from medications, No change in medical status and No change in substance abuse status.  Informed Julian of diagnostic impressions corresponding with diagnosis of recurrent MDD. Depression severity essentially unchanged since last visit 1 week ago for initial med eval. Overall formulation is that Herminio experienced an anxiety exacerbation last fall that corresponded temporally to transitioning to college and increasing the frequency of his MJ use to daily. This anxiety exacerbation has now mostly resolved, and over past 2 mos has evolved into a major depressive episode. Anxiety consisted of mental worries as well as physical symptoms. There was high baseline anxiety as well as spikes that peaked in minutes, however not enough physical symptoms to categorize as panic attack. Patient was able to drastically reduce the frequency of his MJ use starting around the holidays, with a brisk reduction in anxiety thereafter. Depressive symptoms increased around that time, and have not improved at all 4 weeks into Lexapro 15 mg trial. Prominent symptoms include depressed mood, anhedonia, and slowed cognition. MSE notable for increased latency and psychomotor slowing. Cognitive testing with very high overall functioning, with a relative impairment in processing speed. There are no psychotic symptoms, and no safety concerns including no SI or HI. Functionally patient has experienced impaired school performance. Previously counseled pt to eliminate remaining MJ use, and to drastically reduce or eliminate his unsafe levels of EtOH  use. Checked labs, which were wnl. Pt would benefit from Vit D supplementation for optimal mood support, and he has started supplementation. Have discussed other lifestyle modifications to target depression (regular exercise). Recommended cross-tapering from Lexapro to Zoloft to target depression with initial titration to 100 mg. Discussed risk of worsening depression or SI, risk of serotonin syndrome while on multiple serotonin specific reuptake inhibitors. Advised pt to not use trazodone PRN during the cross-taper process. For additional support, recommended Day Tx, which pt/family agreed to.    Future Med Considerations:  - If Zoloft partially effective, up-titrate. If still only partially effective at max, could use combo w/ Wellbutrin. If Zoloft not at all effective at 100 mg x 4 wks, would consider switch to SNRI or Wellbutrin.    Per cognitive testing:   Julian was informed of results of cognitive testing (patient full scale IQ was in the Superior range). He was asked to share his feedback regarding the result of the cognitive testing and whether the test results seemed accurate. He reported that one of his current symptoms includes  processing information slowly.  He also mentioned experiencing  cognitive deficits  which he believes are not a true reflection of his previous cognitive abilities. It was explained to patient that his symptoms of anxiety could be contributing to his processing speed scores. Additionally, he was informed of results of the [place] Multiphasic Personality Inventory-Restructured Form (MMPI-RF) and Doherty Anxiety Inventory (MU). Results on the MMPI revealed that he endorsed symptoms related to difficulty concentrating, confusion, and indecisiveness. Finally, he endorsed above-average level of stress and worry.  Julian s score on the MU was in the low anxiety range. The possibility for the discrepancies in both scores were explained to the patient. The MMPI assesses adult  personality and psychopathology. The MU is a face valid measure. Julian mentioned that since he has been experiencing anxiety symptoms for some years, this might have impacted how he responded to the MU measure.    Psychosocial considerations:   Herminio reported that his symptoms and anxiety and depression have continued to be a source of distress. His parents confirmed these reports, and all of them expressed continued concern. Herminio and his parents received suggestions that included transferring his medication prescribing to this clinic for on-going care, individual and/or group therapy for Herminio, and group therpy/psychoeducation for his parents. Mona (Herminio's mother) expressed concern about the wait times to get into individual therapists. They were given information about [facility] resources, referrals to a NP in this clinic and to day treatment options in [place]. Additionally, they were given instructions to search for providers that use modalities of CBT on Psychology today and referred to a clinic in [place] that specializes in Anxiety treatments.     Mental Status Exam:   Alertness: alert  and oriented  Attention Span and Concentration:  Normal  Attitude:  cooperative   Appearance: awake, alert and adequately groomed  Behavior/Demeanor: cooperative and pleasant, with good eye contact   Speech: increased latency of response and slowed  Language: intact. Preferred language identified as English.  Psychomotor Behavior:  physical retardation  Mood: depressed  Affect: restricted range, congruent to mood and thought content  Associations:  no loose associations  Thought Process:  linear and goal oriented  Thought Content:  no evidence of suicidal ideation or homicidal ideation and no evidence of psychotic thought  Perception:  Reports none;  Denies auditory hallucinations and visual hallucinations  Insight: adequate  Judgment: fair and adequate for safety  Impulse Control:  intact  Cognition: does  appear  grossly intact; formal cognitive testing was not done    Recommendations:  Informed Julian that he does not seem appropriate for First Episode of Psychosis, Strengths Program.     Recommended community services:  -Medication Management (Psychiatry/Nurse Practitioner)  -CBT (Counseling)  -Young Adult Groups (Counseling)  -Family Psychoeducation and Supports (Family Therapy + Group)    For Julian, it is recommended that they transfer care to NP for ongoing medication management.  Follow-up appointments outlined below.  Additional community support recommendations include individual and group therapy options.  Considerations to improve cognitive functioning discussed and include possibility of engagement with research studies in future.  They were not interested in research opportunities available through the [facility] at this time.  Julian's response was cautiously positive. Family's response was also cautiously positive.    Plan:  Julian was agreeable to beginning the below mentioned services.  Follow-up appointments have been arranged for the appropriate providers to initiate services.    1) Medications   - Cross-taper Lexapro to Zoloft:    Week 1: take Lexapro 10 mg daily and Zoloft 25 mg daily    Week 2: take Lexapro 5 mg daily and Zoloft 50 mg daily    Week 3: stop Lexapro and take Zoloft 100 mg daily    Week 4 and beyond: continue taking Zoloft 100 mg daily until next follow up visit   - Continue Vit D 2000 international unit(s) daily    2) Intake contacted to arrange for transfer visit with NP, ideal timeframe would be within 2-4 weeks    3) Referral ordered placed for [facility] Day [facility], would consider dual diagnosis program since pt is working on cutting back on EtOH and MJ use      Treatment Risk Statement:  The patient understands the risks, benefits, adverse effects and alternatives. Agrees to treatment with the capacity to do so. No medical contraindications to treatment. Agrees to call  clinic for any problems. The patient understands to call 911 or go to the nearest ED if life threatening or urgent symptoms occur.      DIETER Treviño candidate  First Episode Psychosis- Strengths Program   Social Work Intern  Direct Phone: [phone]  Fax: [phone]    [facility]  [facility], 2nd floor  [address], [address]  [place] [address]      Please call or EPIC message for questions or concerns related to the above information.     Patient not staffed in clinic. Note will be reviewed and signed by supervisor Dr. Silverman.    I did not see this pt directly. I have reviewed the documentation, and I agree with the observations and plan of care.    Candelaria Silverman MD

## 2020-02-21 NOTE — PATIENT INSTRUCTIONS
Medications:   1. You are switching off of Lexapro and onto Zoloft.   Week 1: take Lexapro 10 mg daily and Zoloft 25 mg daily   Week 2: take Lexapro 5 mg daily and Zoloft 50 mg daily   Week 3: stop Lexapro and take Zoloft 100 mg daily   Week 4 and beyond: continue taking Zoloft 100 mg daily until next follow up visit    2. Continue taking Vit D 2000 international unit(s) daily    3. Do not take trazodone during the switch from Lexapro to Zoloft because trazodone has serotonin activity and may increase risk of serotonin syndrome.    Please call clinic with any side effect concerns. Full serotonin specific reuptake inhibitor information below:      INSTRUCTIONS FOR USE OF SSRI ANTIDEPRESSANTS      DO:    - call clinic if you, or another provider, makes any medication changes  - call clinic if your use of Ibuprofen (or similar) increases significantly  - call clinic if you experience any symptom listed below      DO NOT:  stop this med abruptly         TRY TO AVOID:  excessive use of ibuprofen or similar pain medication      FOOD: take with or without food       COMMON ADVERSE EFFECTS:  headache, dizziness, nausea, diarrhea, fatigue, sleepiness, sexual problems, weight gain, anxiety, a need to pace or restlessness       CALL CLINIC URGENTLY or EMERGENCY ROOM:  if you have any concerns, especially the following...    - abnormal bleeding  or  easy bruising ([place] if increase use of ibuprofen etc)  - significant pacing, restlessness or muscle spasm  - thoughts of death or hurting yourself    - suspect Serotonin Syndrome:   confusion   tremor  severe headache  sweats  fever   funny feeling in muscles  need to pace / restlessness   severe nausea, diarrhea    Thank you for coming to the PSYCHIATRY CLINIC.    Lab Testing:  If you had lab testing today and your results are reassuring or normal they will be mailed to you or sent through [email or web address] within 7 days.   If the lab tests need quick action we will call you with the  results.  The phone number we will call with results is # [phone] (home) . If this is not the best number please call our clinic and change the number.    Medication Refills:  If you need any refills please call your pharmacy and they will contact us. Our fax number for refills is [phone]. Please allow three business for refill processing.   If you need to  your refill at a new pharmacy, please contact the new pharmacy directly. The new pharmacy will help you get your medications transferred.     Scheduling:  If you have any concerns about today's visit or wish to schedule another appointment please call our office during normal business hours [phone] (8-5:00 M-F)    Contact Us:  Please call [phone] during business hours (8-5:00 M-F).  If after clinic hours, or on the weekend, please call  [phone].    Financial Assistance [phone]  [place] Billing [phone]  [place] Billing Office, [facility]: [phone]  [place] Billing [phone]  Medical Records [phone]      MENTAL HEALTH CRISIS NUMBERS:  [facility]:   [facility] - [phone]   Crisis Residence [facility]idence - [phone]   Walk-In Counseling [facility] [phone]   COPE 24/7 [place] Mobile Team for Adults - [[phone]]; Child - [[phone]]        [facility]:   [facility] - [phone]   Walk-in counseling [facility] - [phone]   Walk-in counseling [facility] - [phone]   Crisis Residence [facility] - [phone]   Urgent Care Adult Mental Health:   --Drop-in, 24/7 crisis line, and [place] Mobile Team [[phone]]    CRISIS TEXT LINE: Text [phone] from anywhere, anytime, any crisis 24/7;    OR SEE www.[email or web address]     National Suicide Prevention Lifeline: 033-233-LKRK (950-887-1602)    Poison Control Center - [phone]    CHILD: [facility] needs assessment team -  [phone]     [facility] - [phone]; or [facility] - [phone]    If you have a medical emergency please call 911or go to the nearest ER.                    _____________________________________________    Again thank you for choosing PSYCHIATRY CLINIC and please let us know how we can best partner with you to improve you and your family's health.  You may be receiving a survey regarding this appointment. We would love to have your feedback, both positive and negative. The survey is done by an external company, so your answers are anonymous.

## 2020-02-21 NOTE — Clinical Note
I forwarded this to Candelaria, do I need to do anything else considering that we were logged in under me when you made the referral?Thanks, Jesu

## 2020-02-21 NOTE — PROGRESS NOTES
Therapy Management:                                                    Julian Berg is a 19 year old male coming in for a First Episode Psychosis Strengths Program Explanation of Findings Visit.      Discussion: Pt is here today for FEP Findings Visit which concludes FEP team intake/assessment.  PDIV student, Shereen Weber, participated in Findings Visit today, writer did not directly observe visit. Summary of mediation changes initiated today include cross-taper from Lexapro to Zoloft. Pt was counseled on s/s serotonin syndrome. Swabbing for [place] [employer] genetic testing was also completed today. Please see team note 2/21/20 for full details regarding team assessment, diagnostic impressions, and recommendations.      Ele Pandya, PharmD, BCPP  Medication Therapy Management Pharmacist  [facility]  [phone]

## 2020-02-25 ENCOUNTER — TELEPHONE (OUTPATIENT)
Dept: PSYCHIATRY | Facility: CLINIC | Age: 20
End: 2020-02-25

## 2020-02-25 NOTE — TELEPHONE ENCOUNTER
Writer called Mona to pass along a learner opening with Dr. Cierra Malhotra. Mona was very interested in speaking with the learner. Writer passed contact information to Dr. Malhotra via EPIC message.   DIETER Treviño candidate  First Episode Psychosis- Strengths Program   Social Work Intern  Direct Phone: [phone]

## 2020-02-26 ENCOUNTER — TELEPHONE (OUTPATIENT)
Dept: PSYCHIATRY | Facility: CLINIC | Age: 20
End: 2020-02-26

## 2020-02-26 NOTE — TELEPHONE ENCOUNTER
On 20,  Julian Berg ,  2000, arrived for Gene Sight Test.  Obtained buccal swab of both cheeks and patient's insurance information. Patient or minor patient's father , signed Patient Consent form and verified information on Cheek Swab Envelope. Ordering clinician, Dr. David (for Dr. Jairon Yost), signed Cheek Swab Envelope. Order placed with [employer]. [employer] scheduled to  overnight envelope..Mer Castano LPN

## 2020-03-02 ENCOUNTER — TELEPHONE (OUTPATIENT)
Dept: PSYCHIATRY | Facility: CLINIC | Age: 20
End: 2020-03-02

## 2020-03-02 NOTE — TELEPHONE ENCOUNTER
Received fax from [employer] ([phone]) indicating that a PA is required for pt's sertraline.  Writer initiated PA via [employer]. Key=[id number].    Your information has been submitted to [employer]. [employer] is reviewing the PA request and you will receive an electronic response. You may check for the updated outcome later by reopening this request. The standard fax determination will also be sent to you directly.  If you have any questions about your PA submission, contact [employer] at [phone].      Approvedtoday  Effective from 02/21/2020 through 03/21/2020.    Writer called pharmacy and identified the approval. Responder reported that it was picked up on 2/23 and it was successfully billed to insurance.            Tried and failed:  Escitalopram. 15 mg. 12/11/19-present. Ineffective (tapering off).  Citalopram. 10 mg. 11/29/19-12/11/19.      Rationale:  Since the development of prominent symptoms including depressed mood, anhedonia, and slowed cognition (MSE notable for increased latency and psychomotor slowing), the patient has been treated with escitalopram and citalopram with no benefit.   The patient has notable risk factors for self-harm, including age, single status, anxiety, substance abuse and recent loss of educational plan.  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

## 2020-03-04 ENCOUNTER — TELEPHONE (OUTPATIENT)
Dept: PSYCHIATRY | Facility: CLINIC | Age: 20
End: 2020-03-04

## 2020-03-04 NOTE — TELEPHONE ENCOUNTER
On 3/4/20 12 pages of Gene Sight Test results was received. Originals were sent to scanning and a copy was put in [employer] folder. Mer Castano LPN

## 2020-03-06 ENCOUNTER — TELEPHONE (OUTPATIENT)
Dept: PSYCHIATRY | Facility: CLINIC | Age: 20
End: 2020-03-06

## 2020-03-06 ENCOUNTER — OFFICE VISIT (OUTPATIENT)
Dept: PSYCHIATRY | Facility: CLINIC | Age: 20
End: 2020-03-06
Attending: NURSE PRACTITIONER
Payer: COMMERCIAL

## 2020-03-06 VITALS
BODY MASS INDEX: 22.87 KG/M2 | DIASTOLIC BLOOD PRESSURE: 74 MMHG | WEIGHT: 183 LBS | SYSTOLIC BLOOD PRESSURE: 122 MMHG | HEART RATE: 65 BPM

## 2020-03-06 DIAGNOSIS — F33.1 MODERATE EPISODE OF RECURRENT MAJOR DEPRESSIVE DISORDER (H): Primary | ICD-10-CM

## 2020-03-06 DIAGNOSIS — F41.9 ANXIETY: ICD-10-CM

## 2020-03-06 PROCEDURE — G0463 HOSPITAL OUTPT CLINIC VISIT: HCPCS | Mod: ZF

## 2020-03-06 RX ORDER — SERTRALINE HYDROCHLORIDE 100 MG/1
100 TABLET, FILM COATED ORAL DAILY
Qty: 30 TABLET | Refills: 1 | Status: SHIPPED | OUTPATIENT
Start: 2020-03-06 | End: 2020-04-10

## 2020-03-06 ASSESSMENT — PAIN SCALES - GENERAL: PAINLEVEL: NO PAIN (0)

## 2020-03-06 NOTE — PATIENT INSTRUCTIONS
Therapy  [facility] for psychology (CBT oriented) [phone]  [email or web address]    -Start L-methylfolate supplementation ([place] brand has good reputation) start with 5 mg daily and you can take up to 15 mg daily  -Continue on your Lexapro taper off  -Continue on Zoloft taper up

## 2020-03-06 NOTE — PROGRESS NOTES
Psychiatry Transfer of Care Progress Note                                                                  Patient Name: Julian Berg  YOB: 2000  MRN: [id number]  Date of Service:  3/6/2020  Last Seen:2/14/2020 by Dr Yost, medication recommendation on 2/21/2020 by team    Julian Berg is a 19 year old male who uses the name Hreminio and pronoun duane.      Herminio Berg is a 19 year old year old adult with unspecified depressive d/o and anxiety, ETOH and cannabis use d/o who presents for transfer of psychiatric care from Dr Yost.  Julian Berg was last seen in clinic on 2/14/2020.     At that time,     1) Medications               - Cross-taper Lexapro to Zoloft:                            Week 1: take Lexapro 10 mg daily and Zoloft 25 mg daily                            Week 2: take Lexapro 5 mg daily and Zoloft 50 mg daily                            Week 3: stop Lexapro and take Zoloft 100 mg daily                            Week 4 and beyond: continue taking Zoloft 100 mg daily until next follow up visit               - Continue Vit D 2000 international unit(s) daily     2) Intake contacted to arrange for transfer visit with NP, ideal timeframe would be within 2-4 weeks     3) Referral ordered placed for [facility], would consider dual diagnosis program since pt is working on cutting back on EtOH and MJ use    See 2/21/2020 for comprehensive team recommendation    Pertinent Background:  Previous diagnoses include depression, anxiety, and Crohn's.  Notably, patient grew up in an intact family, with no trauma hx, and excelled educationally up until college.  Full scale IQ in superior range.     Psych critical item history includes SUBSTANCE USE: cannabis.  SIB? (hit head against a car window in context of extreme anxiety in 12/2019)    Previous medication trials: Celexa (not effective during HS) Lexapro (cognitive clouding?)    Pt was seen with his mother for entire appt with his  "consent.    Interim History                                                                                                        4, 4     Since the last visit, pt reports he has been currently on Zoloft 50 mg daily and Lexapro 5 mg daily and will increase it to Zoloft 100 mg daily and stop Lexapro.  Has not been using PRN Vistaril or Trazodone, but anxiety and sleep has not exacerbated.  Occasional Melatonin 3 mg HS use.  Sleeping OK.  Pt reports he has not noticed significant changes in symptoms.  Continues to report \"cognitive clouding\" throughout the day daily.  Reports this initially started in 12/2019.  Anxiety is surrounding around academic performance, social anxiety and illness management. Notes pt often gets stress headache.  Taking reduced load, feels this is doable. Continues to stay on campus at this time. Denies SI, SIB or HI.    Pt and mother reports they decided not to do IOP, but wanted to do CBT with female therapist.  But reports has not heard anything from the team about availability of therapist at the clinic.    Pt reports he drinks 3-7 drinks/one sitting on weekends only.  Reports he has blacked out this semester x 1.  Denies current cannabis use.    Denies any symptoms suggestive of hypomania or psychosis.    Current Suicidality/Hx of Suicide Attempts: Denies both  CoCominent Medical concerns: Rare Crohn's flare ups, occasional stress headache    Medication Side Effects: The patient denies all medication side effects.    Medical Review of Systems     Apart from the symptoms mentioned int he HPI, the 14 point review of systems, including constitutional, HEENT, cardiovascular, respiratory, gastrointestinal, genitourinary, musculoskeletal, integumentary, endocrine, neurological, hematologic and allergic is entirely negative except occasional headache and rare Crohn's flare ups      Substance Use   Pls see HPI    Dr Dionne MERCHANT on 2/14/2020 notes daily cannabis use last semester and daily tobacco " vaping.      Medical / Surgical History                                                                                                                  Patient Active Problem List   Diagnosis     Psychosis, unspecified psychosis type (H)       Past Surgical History:   Procedure Laterality Date     GENITOURINARY SURGERY          Social/ Family History                                  [per patient report]                                 1ea,1ea     Living arrangements: lives on campus, feels safe  Social Support: family  Access to gun: denies    FAMILY MENTAL HEALTH HX-  Mental Illness History: Yes: maternal grandmother had OCD  Substance Abuse History: Yes: paternal grandfather had alcoholism  Medical conditions: Unknown  Reports history of completed suicide (great grandmother ?).    Allergy                                Nuts    Current Medications                                                                                                       Current Outpatient Medications   Medication Sig Dispense Refill     adalimumab (HUMIRA) 40 MG/0.8ML prefilled syringe kit Inject 40 mg Subcutaneous every 7 days       azaTHIOprine 100 MG TABS Take 100 mg by mouth daily        cetirizine (ZYRTEC) 10 MG tablet Take 10 mg by mouth daily       diphenhydrAMINE (BENADRYL ALLERGY) 25 MG capsule Take 1 capsule (25 mg) by mouth every 6 hours as needed for itching or allergies 20 capsule 0     EPINEPHrine (EPIPEN/ADRENACLICK/OR ANY BX GENERIC EQUIV) 0.3 MG/0.3ML injection 2-pack Inject 0.3 mg into the muscle as needed for anaphylaxis       escitalopram 10 MG PO tablet Take 1 tablet (10 mg) by mouth daily for 7 days, THEN 0.5 tablets (5 mg) daily for 7 days. Then stop. 11 tablet 0     hydrOXYzine (VISTARIL) 50 MG capsule Take 1 capsule (50 mg) by mouth 3 times daily as needed for anxiety 30 capsule 0     lactobacillus rhamnosus, GG, (CULTURELL) capsule Take 1 capsule by mouth 2 times daily       melatonin 3 MG CAPS Take 3 mg by  mouth At Bedtime       sertraline 50 MG PO tablet Take 0.5 tablets (25 mg) by mouth daily for 7 days, THEN 1 tablet (50 mg) daily for 7 days, THEN 2 tablets (100 mg) daily for 14 days. 39 tablet 0     traZODone (DESYREL) 50 MG tablet Take 50 mg by mouth At Bedtime         Vitals                                                                                                                       3, 3     Vitals:    03/06/20 1103   BP: 122/74   Pulse: 65   Weight: 83 kg (183 lb)        Mental Status Exam                                                                                   9, 14 cog        Alertness: alert  and oriented  Appearance:  Casually dressed and Adequately groomed  Behavior/Demeanor: cooperative, pleasant, calm and occasionally irritated towards mother, with fair  eye contact   Speech: regular rate and rhythm  Mood :  depressed, anhedonia, anxious and worried  Affect: slightly restricted; was congruent to mood; was congruent to content  Thought Process (Associations):  Linear and Goal directed  Thought process (Rate):  Mostly normal, slightly slow  Thought content:  no overt psychosis, denies suicidal ideation, intent or thoughts and patient does not appear to be responding to internal stimuli  Perception:  Reports none;  Denies auditory hallucinations, visual hallucinations, depersonalization and derealization  Attention/Concentration:  Fair  Memory:  Immediate recall intact and Short-term memory intact  Language: intact  Fund of Knowledge/Intelligence:  Average  Abstraction:  Normal  Insight:  Adequate  Judgment:  Adequate for safety  Cognition: (6) does  appear grossly intact; formal cognitive testing was not done      Physical Exam     Motor activity/EPS:  Normal  Gait:  Normal  Psychomotor: normal or unremarkable    Labs and Results      Pertinent findings on review include: Review of records  with relevant information reported in the HPI.  Reviewed pt's past medical record and obtained  "collateral information.    [place] PRESCRIPTION MONITORING PROGRAM [] was checked today:  not using controlled substances.    PHQ9 Today:  12  PHQ 1/20/2020   PHQ-9 Total Score 23   Q9: Thoughts of better off dead/self-harm past 2 weeks Several days     No lab results found.  No lab results found.    PSYCHOTROPIC DRUG INTERACTIONS:    Lexapro---Zoloft: increased serotonin toxicity.  MANAGEMENT:  using lowest therapeutic dose of [Lexapro], patient is aware of risks and N/A    Impression/Assessment     Julian Berg is a 19 year old adult  who presents for transfer of care.  Pt appears somewhat depressed and irritable, but this may be in context of mother of present and occasionally \"talking for him\" (mother apologizes to him during the appt several times for doing this).  Pt reports anxiety is mostly performance, social anxiety as well as symptom management of mental illness.    Pt has not noticed much improvement in symptoms, but this may be in context of brief mediation taper process; pt is tapering lexapro to Zoloft 2 weeks ago.  Also reviewed [employer] results today and Zoloft, Lexapro and Celexa have all indicated possible significant reduced efficacy.  Discussed since pt is in lower dose of Zoloft, we can also cross taper to SNRI.  Also, discussed since pt has only tried low dose of Zoloft for brief time while cross tapering, we can continue on cross taper at this time and maximize Zoloft at this time and if this was not effective, to switch the medication.  Pt decided to stay on current Zoloft cross taper at this time. Also, since pt does not have renal or liver function test completed, recommended CMP testing especially in context of heavy weekend drinking.  Pt declined any lab at this time.    Also discussed though Vitamin D level was WNL, since it's in lower normal value, recommended Vitamin D supplementation.  Also, as pt has reduced folate conversion, strongly encouraged methylfolate " supplementation.    Discussed that this writer would reach out to the team (though pt would not receive team care from this point) for therapist referral within a clinic.  But also given referral to the clinic nearby.      Diagnosis                                                                   Depression (substance induced depression vs MDD)  Anxiety (subtance induced anxiety vs MARTA, social anxiety disorder)  Alcohol use disorder, moderate  Hx of cannabis use disorder, severe    Treatment Recommendation & Plan       Medication Ordered/Consults/Labs/tests Ordered:     Medication:   -Continue on your Lexapro taper off  -Continue on Zoloft taper up  OTC Recommendations:   -Start L-methylfolate supplementation ([place] brand has good reputation) start with 5 mg daily and you can take up to 15 mg daily  Lab Orders:  Not today, will consider CMP at one point  Referrals:   Therapy  [facility] for psychology (CBT oriented) [phone]  [email or web address]  Release of Information: none  Future Treatment Considerations: per symptoms. Maximize Zoloft  Return for Follow Up: in 3-4 weeks    -Discussed safety plan for suicidal thoughts  -Discussed plan for suicidality  -Discussed available emergency services  -Patient agrees with the treatment plan  -Encouraged to continue outpatient therapy to gain more coping mechanism for stress.      Treatment Risk Statement: Discussed with the patient my impressions, as well as recommended studies. I educated patient on the differential diagnosis and prognosis. I discussed with the patient the risks and benefits of medications versus no interventions, including efficacy, dose, possible side effects and length of treatment and the importance of medication compliance.  The patient understands the risks, benefits, adverse effects and alternatives. Agrees to treatment with the capacity to do so. No medical contraindications to treatment. The patient also understands  the risks of using street drugs or alcohol.     CRISIS NUMBERS:   Provided routinely in AVS.       Aga Dumont CNP,  3/6/2020

## 2020-03-06 NOTE — TELEPHONE ENCOUNTER
Mona Berg left a message for writer wondering about the genesight testing results, and wondering about the status of the referral to 's learner. This writer shared the referral with Herminio's current prescriber and left a message for Mona with those details.   DIETER [employer]vi[employer] candidate  First Episode Psychosis- Strengths Program   Social Work Intern  Direct Phone: [phone]

## 2020-04-01 ENCOUNTER — VIRTUAL VISIT (OUTPATIENT)
Dept: PSYCHIATRY | Facility: CLINIC | Age: 20
End: 2020-04-01
Attending: PSYCHOLOGIST
Payer: COMMERCIAL

## 2020-04-01 DIAGNOSIS — F33.2 SEVERE EPISODE OF RECURRENT MAJOR DEPRESSIVE DISORDER, WITHOUT PSYCHOTIC FEATURES (H): ICD-10-CM

## 2020-04-01 DIAGNOSIS — F41.1 GAD (GENERALIZED ANXIETY DISORDER): Primary | ICD-10-CM

## 2020-04-10 ENCOUNTER — TELEPHONE (OUTPATIENT)
Dept: PSYCHIATRY | Facility: CLINIC | Age: 20
End: 2020-04-10

## 2020-04-10 ENCOUNTER — VIRTUAL VISIT (OUTPATIENT)
Dept: PSYCHIATRY | Facility: CLINIC | Age: 20
End: 2020-04-10
Attending: NURSE PRACTITIONER
Payer: COMMERCIAL

## 2020-04-10 DIAGNOSIS — F41.9 ANXIETY: Primary | ICD-10-CM

## 2020-04-10 DIAGNOSIS — F33.1 MODERATE EPISODE OF RECURRENT MAJOR DEPRESSIVE DISORDER (H): ICD-10-CM

## 2020-04-10 RX ORDER — GABAPENTIN 100 MG/1
100-300 CAPSULE ORAL 3 TIMES DAILY PRN
Qty: 270 CAPSULE | Refills: 0 | Status: SHIPPED | OUTPATIENT
Start: 2020-04-10 | End: 2020-05-16

## 2020-04-10 NOTE — PATIENT INSTRUCTIONS
-May take Gabapentin 100-300 mg up to 3 times a day for anxiety and sleep.  Please try it from the lowest dose.  -Discontinued Zoloft as you are not taking the medication any longer    Your next appointment is on 5/22/2020 (Fri) at 11:30am.  We will assume it will be video visit at this time, but if there's any changes to this, you will hear it from the clinic.

## 2020-04-10 NOTE — TELEPHONE ENCOUNTER
On 4/10/20, at 1115, writer called patient at [phone] to confirm Virtual Visit. Call went  to  with a female by the name of Susy. Writer was uncomfortable leaving  at this number. Mer Castano LPN

## 2020-04-10 NOTE — PROGRESS NOTES
"Julian Berg is a 19 year old male who is being evaluated via a billable video visit.      The patient has been notified of following:     \"This video visit will be conducted via a call between you and your physician/provider. We have found that certain health care needs can be provided without the need for an in-person physical exam.  This service lets us provide the care you need with a video conversation.  If a prescription is necessary we can send it directly to your pharmacy.  If lab work is needed we can place an order for that and you can then stop by our lab to have the test done at a later time.    Video visits are billed at different rates depending on your insurance coverage.  Please reach out to your insurance provider with any questions.    If during the course of the call the physician/provider feels a video visit is not appropriate, you will not be charged for this service.\"    Patient has given verbal consent for Video visit? YES    How would you like to obtain your AVS? E-Mail (inform patient AVS not encrypted)    Patient would like the video invitation sent by: Send to e-mail at: [email or web address]      Video Start Time: 11:30 AM    Julian Berg complains of    Chief Complaint   Patient presents with     Recheck Medication       Psychiatry Clinic Progress Note                                                                  Patient Name: Julian Berg  YOB: 2000  MRN: [id number]  Date of Service:  4/10/2020  Last Seen:3/6/2020    Julian Berg is a 19 year old male who uses the name Herminio and pronoun duane.      Start Time:  1130         End Time: 1221    Telemedicine Visit: The patient's condition can be safely assessed and treated via synchronous audio and visual telemedicine encounter.      Reason for Telemedicine Visit: Due to COVID 19 pandemic, clinic switching all appointments to telemedicine     Originating Site (Patient Location): Patient's home    Distant Site " (Provider Location): Provider Remote Setting    Consent:  The patient/guardian has verbally consented to: the potential risks and benefits of telemedicine (video visit) versus in person care; bill my insurance or make self-payment for services provided; and responsibility for payment of non-covered services.     Mode of Communication:  Video Conference via Snapdeal     As the provider I attest to compliance with applicable laws and regulations related to telemedicine.    Herminio Berg is a 19 year old year old adult who presents for ongoing psychiatric care.  Herminio Berg was last seen in clinic on 3/6/2020.     At that time,     Medication Ordered/Consults/Labs/tests Ordered:      Medication:   -Continue on your Lexapro taper off  -Continue on Zoloft taper up  OTC Recommendations:   -Start L-methylfolate supplementation ([place] brand has good reputation) start with 5 mg daily and you can take up to 15 mg daily  Lab Orders:  Not today, will consider CMP at one point  Referrals:   Therapy  [facility] for psychology (CBT oriented) [phone]  [email or web address]  Release of Information: none  Future Treatment Considerations: per symptoms. Maximize Zoloft  Return for Follow Up: in 3-4 weeks      Pertinent Background:  Previous diagnoses include depression, anxiety, and Crohn's.  Notably, patient grew up in an intact family, with no trauma hx, and excelled educationally up until college.  Full scale IQ in superior range.     Psych critical item history includes SUBSTANCE USE: cannabis.  SIB? (hit head against a car window in context of extreme anxiety in 12/2019)     Previous medication trials: Celexa (not effective during HS) Lexapro (cognitive clouding?), Zoloft     Pt was seen with  Mona,his mother for entire appt with his consent.    Interim History                                                                                                        4, 4     Since the last visit, pt  reports he stopped taking Zoloft x 1 week.  He tapered off the medication and not having any withdrawal symptoms.  He wanted to see how his cognitive clouding, especially memory difficulties would improve/not improve with stopping Zoloft.  So far, he could not tell if there has been any changes since it's been only 1 week.  Pt denies any exacerbation of depression or anxiety after discontinuing Zoloft, denies SI, SIB or HI.  Pt started methlyfolate supplementation since last seen, was off of Vitamin D for a while, but restarted x 1 week.  Pt wondering if actually what he needs is ADHD medication to help with memory and cognitive clouding.      Since COVID pandemic, pt has returned to live with his parents, feeling safe.  Taking online classes, but continues to have difficulties with concentration and memory and very concerned about this as he always excelled academically.  Pt denies any alcohol or cannabis use since living with parents.    Mother also wonders since he was very anxious about cognitive clouding when he was taking Lexapro and Zoloft, she finds pt is less anxious being off of Zoloft since he does not have to worry if medication was causing memory difficulties.  But wondering if there's medications that he can take as needed if his anxiety becomes significant or sleep difficulties like in the past.    Denies any symptoms suggestive of hypomania or psychosis.    Current Suicidality/Hx of Suicide Attempts: Denies both  CoCominent Medical concerns: rare Crohn's flare ups    Medication Side Effects: The patient denies all medication side effects.      Medical Review of Systems     Apart from the symptoms mentioned int he HPI, the 14 point review of systems, including constitutional, HEENT, cardiovascular, respiratory, gastrointestinal, genitourinary, musculoskeletal, integumentary, endocrine, neurological, hematologic and allergic is entirely negative, rare Crohn's flare up.      Substance Use   Denies any  use of substance x 3-4 weeks      Medical / Surgical History                                                                                                                  Patient Active Problem List   Diagnosis     Psychosis, unspecified psychosis type (H)       Past Surgical History:   Procedure Laterality Date     GENITOURINARY SURGERY          Social/ Family History                                  [per patient report]                                 1ea,1ea     Living arrangements: lives with his parents due to COVID pandemic, feels safe, was living on campus previously  Social Support: family  Access to gun: denies    Allergy                                Nuts    Current Medications                                                                                                       Current Outpatient Medications   Medication Sig Dispense Refill     adalimumab (HUMIRA) 40 MG/0.8ML prefilled syringe kit Inject 40 mg Subcutaneous every 7 days       azaTHIOprine 100 MG TABS Take 100 mg by mouth daily        cetirizine (ZYRTEC) 10 MG tablet Take 10 mg by mouth daily       diphenhydrAMINE (BENADRYL ALLERGY) 25 MG capsule Take 1 capsule (25 mg) by mouth every 6 hours as needed for itching or allergies 20 capsule 0     EPINEPHrine (EPIPEN/ADRENACLICK/OR ANY BX GENERIC EQUIV) 0.3 MG/0.3ML injection 2-pack Inject 0.3 mg into the muscle as needed for anaphylaxis       lactobacillus rhamnosus, GG, (CULTURELL) capsule Take 1 capsule by mouth 2 times daily       melatonin 3 MG CAPS Take 3 mg by mouth At Bedtime       escitalopram 10 MG PO tablet Take 1 tablet (10 mg) by mouth daily for 7 days, THEN 0.5 tablets (5 mg) daily for 7 days. Then stop. 11 tablet 0     sertraline (ZOLOFT) 100 MG tablet Take 1 tablet (100 mg) by mouth daily (Patient not taking: Reported on 4/10/2020) 30 tablet 1     sertraline 50 MG PO tablet Take 0.5 tablets (25 mg) by mouth daily for 7 days, THEN 1 tablet (50 mg) daily for 7 days, THEN 2  "tablets (100 mg) daily for 14 days. 39 tablet 0         Vitals                                                                                                                       3, 3   There were no vitals taken for this visit.        Mental Status Exam                                                                                   9, 14 cog        Alertness: alert  and oriented  Appearance:  Casually dressed and Adequately groomed  Behavior/Demeanor: cooperative and calm, with fair  eye contact   Speech: regular rate and rhythm  Mood :  \"okay\"  Affect: slightly blunted; was not congruent to mood; was not congruent to content  Thought Process (Associations):  Linear and Goal directed  Thought process (Rate):  Slightly slowed  Thought content:  no overt psychosis, denies suicidal ideation, intent or thoughts and patient does not appear to be responding to internal stimuli  Perception:  Reports none;  Denies auditory hallucinations and visual hallucinations  Attention/Concentration:  Fair  Memory:  Immediate recall intact and Short-term memory intact  Language: intact  Fund of Knowledge/Intelligence:  Average  Abstraction:  Normal  Insight:  Adequate and Fair  Judgment:  Fair and Adequate for safety  Cognition: (6) does  appear grossly intact; formal cognitive testing was not done    Physical Exam     Motor activity/EPS:  Normal  Gait:  Normal  Psychomotor: normal or unremarkable    Labs and Results      Pertinent findings on review include: Review of records  with relevant information reported in the HPI.  Reviewed pt's past medical record and obtained collateral information.      [place] PRESCRIPTION MONITORING PROGRAM [] was checked today:  not using controlled substances.    PHQ9 Today:  N/A  PHQ 1/20/2020   PHQ-9 Total Score 23   Q9: Thoughts of better off dead/self-harm past 2 weeks Several days       MARTA 7 Today: N/A  MARTA-7 SCORE 1/20/2020   Total Score 17       No lab results found.  No lab results " found.    PSYCHOTROPIC DRUG INTERACTIONS:    no.  MANAGEMENT:  N/A    Impression/Assessment      Negin Berg is a 19 year old adult  who presents for med management follow up.  Pt appears slightly depressed, but not anxious, denies SI, SIB or HI despite of tapering off Zoloft x 1 week with a concern of memory difficulties and cognitive clouding.  Discussed risk of exacerbation of depression and/or anxiety without any medication, but both pt and his mother wants to explore if his cognitive difficulties would improve after being off of serotonin specific reuptake inhibitor for a while as pt had similar episode with Lexapro.  Reiterated possible trial of SNRI from [facility] results and with 3 serotonin specific reuptake inhibitor trials with memory difficulties.  Also, pt wanted trial of ADHD medications, but pt does not appear to meet criteria for ADHD, Wellbutrin could exacerbate anxiety while Clonidine and Guanfacine could decrease his WNL and low to WNL pulse further.  Also discussed possibilities of PRN Gabapentin use for anxiety and sleep if it exacerbates while doing careful monitoring of mood and anxiety without any medication.  Pt and his mother both wanted trial of Gabapentin 100-300 mg TID PRN for anxiety and sleep.    Also discussed possibility of using Magnesium while monitoring for diarrhea carefully for anxiety and sleep as they want supplement support.     Pt reports he stopped using any substance since return to his home, but difficult to comment on current psychiatric symptoms given close proximity to substance use. Diagnostic clarification will require a period of sobriety in addition to longitudinal follow-up and reassessment.  If pt takes Gabapentin consistently, this may help with heavy ETOH use.        Diagnosis                                                                    Depression (substance induced depression vs MDD)  Anxiety (subtance induced anxiety vs MARTA, social anxiety  disorder)  Alcohol use disorder, moderate  Hx of cannabis use disorder, severe    Treatment Recommendation & Plan       Medication Ordered/Consults/Labs/tests Ordered:     Medication:   -May take Gabapentin 100-300 mg up to 3 times a day for anxiety and sleep.  Please try it from the lowest dose.  -Discontinued Zoloft as you are not taking the medication any longer  OTC Recommendations: May take Magnesium for anxiety and sleep  Lab Orders:  none  Referrals: none  Release of Information: none  Future Treatment Considerations: Per symptoms.   Return for Follow Up: 1 month    -Discussed safety plan for suicidal thoughts  -Discussed plan for suicidality  -Discussed available emergency services  -Patient agrees with the treatment plan  -Encouraged to continue outpatient therapy to gain more coping mechanism for stress.    Treatment Risk Statement: Discussed with the patient my impressions, as well as recommended studies. I educated patient on the differential diagnosis and prognosis. I discussed with the patient the risks and benefits of medications versus no interventions, including efficacy, dose, possible side effects and length of treatment and the importance of medication compliance.  The patient understands the risks, benefits, adverse effects and alternatives. Agrees to treatment with the capacity to do so. No medical contraindications to treatment. The patient also understands the risks of using street drugs or alcohol.     CRISIS NUMBERS:   Provided routinely in AVS.        Aga Dumont CNP,  4/10/2020

## 2020-04-24 NOTE — PROGRESS NOTES
"Type of service: Psychotherapy  Time of service: 50 minutes    Date: 04/01/2020    Time Service Began: 1:00 PM    Time Service Ended: 1:50 PM  Reason that telemedicine is appropriate: Patient unable to travel due to Covid19  Mode of transmission: [email or web address]  Location of originating and distant sites:    Originating site (patient location): Pt home address    Distant site (provider site): Remote location    Patient has agreed to receiving services via telemedicine technology.    DSM-5 DIAGNOSIS:  Generalized Anxiety Disorder  Major Depressive Disorder    SUBJECTIVE: Mr. Berg discussed struggling with college and his frustration about not being able to switch majors as easily as he had hoped. 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 Mr. Berg denied any current or historical suicidal/homicidal ideation, plan, or intent. Mr. Berg discussed his mental health and life history with the writer.     TREATMENT: Mr. Berg reported he became overwhelmed last semester which resulted in him withdrawing from a class and taking an incomplete in another class. He reported anxiety with the following symptoms: insomnia, \"headrush,\" cognitive difficulties, memory problems, and discomfort/fear of how his life path could change. These symptoms began in his sophomore year of high school. He experienced an anxiety attack following an adverse reaction to marijuana. He reported depressive symptoms of sleep disturbance, isolation, cognitive difficulties, low mood, and avoidance of hobbies and responsibilities. He denied any SI. Mr. Berg stated his symptoms reappeared in mid-November 2019. He reported he has been struggling with reading comprehension and synthesizing. Mr. Berg received therapeutic services while in high school to address his anxiety, depression, and cognitive difficulties. Mr. Berg stated he has had two psychiatric hospitalizations in 2019: once when his parents thought he " "was suicidal and once due to anxiety. He is currently taking Zoloft but stated he would like to stop taking it as it makes him feel \"numb\" and struggle with memory.    Mr. Berg reported a substance use history. He reported beginning to use marijuana his senior year of high school. He typically would use it at night approximately 4-5 times per week. He reported trying LSD twice during his senior year of high school. He described drinking alcohol socially, approximately 1-2 times per month.     Mr. Berg reported a medical history of nut allergies and Crohn's disease. He reported both are well managed.    Mr. Berg has a family history of psychiatric disorders including: OCD (maternal grandmother), Depression (maternal grandfather and maternal great-grandmother), and Alzheimer's (paternal grandmother).    Mr. Berg was born and raised in [place]. He lived with his mother, father, and twin brother until his parents  when he was in the 4th grade. His father remarried; he has one step-brother. His relationship status is single. Mr. Berg enjoys the following hobbies: listening to music, playing guitar, playing sports, being outside, television shows, and reading.    ASSESSMENT: Mr. Berg arrived on-time. He was dressed casually, appropriately groomed, and appeared his stated age. He was oriented to person, place, and time. Mr. Berg was cooperative. His self-reported mood was \"worried\" and his affect was congruent to mood. His speech was normal in tone, rate, and volume. His thought process was linear and logical. His attention was appropriate.    PLAN:     -RTC 1 week     -Begin determining goals for treatment    I did not see this pt directly. This pt was discussed with me in individual psychotherapy supervision, and I agree with the plan as documented.     Cierra Malhotra, Ph.D., L.P.  "

## 2020-05-14 DIAGNOSIS — F41.9 ANXIETY: ICD-10-CM

## 2020-05-16 RX ORDER — GABAPENTIN 100 MG/1
100-300 CAPSULE ORAL 3 TIMES DAILY PRN
Qty: 270 CAPSULE | Refills: 0 | Status: ON HOLD | OUTPATIENT
Start: 2020-05-16 | End: 2021-01-15

## 2020-05-16 NOTE — TELEPHONE ENCOUNTER
Medication requested: GABAPENTIN 100MG CAPSULES   Last refilled: 4/10/20  Qty: 270      Last seen: 4/10/20  RTC: 1 month  Cancel: 0  No-show: 0  Next appt: 5/22/20    Refill decision:   Refilled for 30 days per protocol.

## 2020-05-22 ENCOUNTER — VIRTUAL VISIT (OUTPATIENT)
Dept: PSYCHIATRY | Facility: CLINIC | Age: 20
End: 2020-05-22
Attending: NURSE PRACTITIONER
Payer: COMMERCIAL

## 2020-05-22 DIAGNOSIS — F10.21 ALCOHOL USE DISORDER, MODERATE, IN EARLY REMISSION (H): ICD-10-CM

## 2020-05-22 DIAGNOSIS — F32.A DEPRESSION, UNSPECIFIED DEPRESSION TYPE: ICD-10-CM

## 2020-05-22 DIAGNOSIS — F41.9 ANXIETY: Primary | ICD-10-CM

## 2020-05-22 ASSESSMENT — PAIN SCALES - GENERAL: PAINLEVEL: NO PAIN (0)

## 2020-05-22 NOTE — PATIENT INSTRUCTIONS
-Continue to be off of medication.  OK to take Gabapentin for anxiety as needed.    Your next appointment is scheduled on 6/26 (Fri) at 11:30am.    To access your telemedicine visit:     Open a web browser, like [employer], and type [email or web address]     You will see a box asking you to check in to let Aga Dumont know that you are here.     Type in your name and press Check In. That will let Aga see you in the virtual waiting room. At your scheduled appointment time, your provider will initiate the visit and connect you.     When your visit is done, you can simply close the browser window.        Please Note:  Ideally, you will connect from a desktop, laptop, or tablet with a WiFi connection. Your computer/tablet must have a camera and microphone. You can use a cell phone, if it has a camera, and if you can connect to WiFi. However, if you connect your phone over a cellular network, it is of lower quality and less reliable      Thank you for coming to the PSYCHIATRY CLINIC.    Lab Testing:  If you had lab testing today and your results are reassuring or normal they will be mailed to you or sent through [employer] within 7 days.   If the lab tests need quick action we will call you with the results.  The phone number we will call with results is # [phone] (home) . If this is not the best number please call our clinic and change the number.    Medication Refills:  If you need any refills please call your pharmacy and they will contact us. Our fax number for refills is [phone]. Please allow three business for refill processing.   If you need to  your refill at a new pharmacy, please contact the new pharmacy directly. The new pharmacy will help you get your medications transferred.     Scheduling:  If you have any concerns about today's visit or wish to schedule another appointment please call our office during normal business hours [phone] (8-5:00 M-F)    Contact Us:  Please call  [phone] during business hours (8-5:00 M-F).  If after clinic hours, or on the weekend, please call [phone].    Financial Assistance: [phone]  [phone]  Central Billing Office, [facility]: [phone]  [phone]  Medical Records: [phone]    MENTAL HEALTH CRISIS NUMBERS:  [facility]:   [facility]: [phone]  Crisis Residence [facility]: [phone]   Walk-In Counseling Center [facility]: [phone]   COPE  [place] Mobile Team: [phone] (adults) [phone] (child)     [facility]:   [facility]: [phone]   Walk-in counseling [facility]: [phone]   Walk-in counseling [facility]: [phone]   Crisis Residence [facility]: [phone]   Urgent Care Adult Mental Health: [phone] Mobile team AND  crisis line    Other Crisis Numbers:   National Suicide Prevention Lifeline: 914-753-JCLL (953-613-4264)  CRISIS TEXT LINE: Text to 305030 for any crisis ; OR www.crisistextline.org   Poison Control Center: 1-262.822.6283  CHILD: [facility] needs assessment team: [phone]   Trans Lifeline: 1-680.102.4005  [employer] Lifeline: 1-184.801.6309    For a medical emergency please call 911 or go to the nearest ER.         _____________________________________________    Again thank you for choosing PSYCHIATRY CLINIC and please let us know how we can best partner with you to improve you and your family's health.    You may be receiving a survey regarding this appointment. We would love to have your feedback, both positive and negative. The survey is done by an external company, so your answers are anonymous.

## 2020-05-22 NOTE — PROGRESS NOTES
"VIDEO VISIT  Julian Berg is a 19 year old patient who is being evaluated via a billable video visit.      The patient has been notified of following:   \"This video visit will be conducted via a call between you and your physician/provider. We have found that certain health care needs can be provided without the need for an in-person physical exam. This service lets us provide the care you need with a video conversation. If a prescription is necessary we can send it directly to your pharmacy. If lab work is needed we can place an order for that and you can then stop by our lab to have the test done at a later time. Insurers are generally covering virtual visits as they would in-office visits so billing should not be different than normal.  If for some reason you do get billed incorrectly, you should contact the billing office to correct it and that number is in the AVS .    Patient has given verbal consent for video visit?:  Yes     How would you like to obtain your AVS?:  [employer]    Video invitation for patient:  DOXY provider, invite not needed    AVS SmartPhrase [PsychAVS] has been placed in 'Patient Instructions':  Yes     Start Time:  1125        End Time: 1135    Telemedicine Visit: The patient's condition can be safely assessed and treated via synchronous audio and visual telemedicine encounter.      Reason for Telemedicine Visit: Due to COVID 19 pandemic, clinic switching all appointments to telemedicine     Originating Site (Patient Location): Patient's home    Distant Site (Provider Location): Provider Remote Setting    Consent:  The patient/guardian has verbally consented to: the potential risks and benefits of telemedicine (video visit) versus in person care; bill my insurance or make self-payment for services provided; and responsibility for payment of non-covered services.     Mode of Communication:  Video Conference via D[employer].    As the provider I attest to compliance with applicable laws and " regulations related to telemedicine.    Psychiatry Clinic Progress Note                                                                  Patient Name: Julian Berg  YOB: 2000  MRN: [id number]  Date of Service:  5/22/2020  Last Seen:4/10/2020    Julian Berg is a 19 year old male who uses the name Liza and pronoun duane.        Julian Berg is a 19 year old year old adult who presents for ongoing psychiatric care.  Julian Berg was last seen on 4/10/2020.     At that time,     Medication Ordered/Consults/Labs/tests Ordered:      Medication:   -May take Gabapentin 100-300 mg up to 3 times a day for anxiety and sleep.  Please try it from the lowest dose.  -Discontinued Zoloft as you are not taking the medication any longer  OTC Recommendations: May take Magnesium for anxiety and sleep  Lab Orders:  none  Referrals: none  Release of Information: none  Future Treatment Considerations: Per symptoms.   Return for Follow Up: 1 month        Pertinent Background:  Previous diagnoses include depression, anxiety, and Crohn's.  Notably, patient grew up in an intact family, with no trauma hx, and excelled educationally up until college.  Full scale IQ in superior range.     Psych critical item history includes SUBSTANCE USE: cannabis.  SIB? (hit head against a car window in context of extreme anxiety in 12/2019)     Previous medication trials: Celexa (not effective during HS) Lexapro (cognitive clouding?), Zoloft (increased anxiety?)     Pt was seen with  Mona,his mother for entire appt with his consent.    Interim History                                                                                                        4, 4     Since the last visit, pt has not tried Gabapentin.  Notes that he has not noticed any anxiety and feels this is fairly well managed.  Feels better off of medications. Pt had one drink since last seen.  Taking 2 introductory classes at [employer] starting 6/8, does not  feel this would increase anxiety.  Denies SI, SIB or HI.    Mona noted pt's sleep schedule has changed due to COVID stay home order, and she initially had some discussion as she thought this was start of depression, but they discussed and Herminio is having more structured sleep schedule now and not concerned about taking scheduled medication.  However, wants to continue close monitoring to see how he does without any medication.    Denies any symptoms suggestive of hypomania or psychosis.    Current Suicidality/Hx of Suicide Attempts: Denies both  CoCominent Medical concerns: rare Crohn's flare up.    Medication Side Effects: The patient denies all medication side effects.      Medical Review of Systems     Apart from the symptoms mentioned int he HPI, the 14 point review of systems, including constitutional, HEENT, cardiovascular, respiratory, gastrointestinal, genitourinary, musculoskeletal, integumentary, endocrine, neurological, hematologic and allergic is entirely negative except rare Crohn's flare up.      Substance Use   Pt had one drink since last seen, no longer using cannabis.      Medical / Surgical History                                                                                                                  Patient Active Problem List   Diagnosis     Psychosis, unspecified psychosis type (H)       Past Surgical History:   Procedure Laterality Date     GENITOURINARY SURGERY          Social/ Family History                                  [per patient report]                                 1ea,1ea   Living arrangements: lives with his parents due to COVID pandemic, feels safe, was living on campus previously  Social Support: family  Access to gun: denies    Allergy                                Nuts    Current Medications                                                                                                       Current Outpatient Medications   Medication Sig Dispense Refill      "adalimumab (HUMIRA) 40 MG/0.8ML prefilled syringe kit Inject 40 mg Subcutaneous every 7 days       azaTHIOprine 100 MG TABS Take 100 mg by mouth daily        cetirizine (ZYRTEC) 10 MG tablet Take 10 mg by mouth daily       diphenhydrAMINE (BENADRYL ALLERGY) 25 MG capsule Take 1 capsule (25 mg) by mouth every 6 hours as needed for itching or allergies 20 capsule 0     EPINEPHrine (EPIPEN/ADRENACLICK/OR ANY BX GENERIC EQUIV) 0.3 MG/0.3ML injection 2-pack Inject 0.3 mg into the muscle as needed for anaphylaxis       gabapentin (NEURONTIN) 100 MG capsule Take 1-3 capsules (100-300 mg) by mouth 3 times daily as needed (anxiety) 270 capsule 0     lactobacillus rhamnosus, GG, (CULTURELL) capsule Take 1 capsule by mouth 2 times daily       melatonin 3 MG CAPS Take 3 mg by mouth At Bedtime          Mental Status Exam                                                                                   9, 14 cog        Alertness: alert  and oriented  Appearance:  Casually dressed and Adequately groomed  Behavior/Demeanor: cooperative and calm, with fair  eye contact   Speech: regular rate and rhythm  Mood :  \"fine\"  Affect: slightly blunted; was congruent to mood; was congruent to content  Thought Process (Associations):  Linear and Goal directed  Thought process (Rate):  Normal  Thought content:  no overt psychosis, denies suicidal ideation, intent or thoughts and patient does not appear to be responding to internal stimuli  Perception:  Reports none;  Denies auditory hallucinations and visual hallucinations  Attention/Concentration:  Normal  Memory:  Immediate recall intact and Short-term memory intact  Language: intact  Fund of Knowledge/Intelligence:  Average  Abstraction:  Normal  Insight:  Fair  Judgment:  Fair  Cognition: (6) does  appear grossly intact; formal cognitive testing was not done    Physical Exam     Motor activity/EPS:  Normal  Gait:  Normal  Psychomotor: normal or unremarkable    Labs and Results    "   Pertinent findings on review include: Review of records with relevant information reported in the HPI.  Reviewed pt's past medical record and obtained collateral information.      [place] PRESCRIPTION MONITORING PROGRAM [] was checked today:  indicates Gabapentin 4/10/2020.    PHQ9 Today:  N/A  PHQ 1/20/2020   PHQ-9 Total Score 23   Q9: Thoughts of better off dead/self-harm past 2 weeks Several days       MARTA 7 Today: N/A  MARTA-7 SCORE 1/20/2020   Total Score 17       No lab results found.  No lab results found.    PSYCHOTROPIC DRUG INTERACTIONS:    no.  MANAGEMENT:  N/A    Impression/Assessment      Herminio Berg is a 19 year old adult  who presents for med management follow up.  Pt appears slightly blunted, but not anxious, denies SI, SIB or HI.  Pt has not tried Gabapentin as he has not experienced any acute anxiety, feels better overall being off of medication.  Pt is starting summer classes on 6/8, but notes these classes should be easy as they are introductory level course at community college.  Pt and mother both wants to be off of medication but while monitoring closely.  Pt has mostly been off of ETOH which may be contributing to his stability.  But agreed to follow up in 1 month to see how he feels.       Diagnosis                                                                   Depression (substance induced depression vs MDD)  Anxiety (subtance induced anxiety vs MARTA, social anxiety disorder)  Alcohol use disorder, moderate, in early remission  Hx of cannabis use disorder, severe    Treatment Recommendation & Plan       Medication Ordered/Consults/Labs/tests Ordered:     Medication: continue to be off of medication, ok to take Gabapentin for acute anxiety  OTC Recommendations: none  Lab Orders:  none  Referrals: none  Release of Information: none  Future Treatment Considerations: Per symptoms.   Return for Follow Up: in 1 month    -Discussed safety plan for suicidal thoughts  -Discussed plan for  suicidality  -Discussed available emergency services  -Patient agrees with the treatment plan  -Encouraged to continue outpatient therapy to gain more coping mechanism for stress.      Treatment Risk Statement: Discussed with the patient my impressions, as well as recommended studies. I educated patient on the differential diagnosis and prognosis. I discussed with the patient the risks and benefits of medications versus no interventions, including efficacy, dose, possible side effects and length of treatment and the importance of medication compliance.  The patient understands the risks, benefits, adverse effects and alternatives. Agrees to treatment with the capacity to do so. No medical contraindications to treatment. The patient also understands the risks of using street drugs or alcohol.    CRISIS NUMBERS:   Provided routinely in AVS.      Aga Dumont CNP,  5/22/2020

## 2020-11-22 ENCOUNTER — HEALTH MAINTENANCE LETTER (OUTPATIENT)
Age: 20
End: 2020-11-22

## 2020-12-29 ENCOUNTER — TELEPHONE (OUTPATIENT)
Dept: PSYCHIATRY | Facility: CLINIC | Age: 20
End: 2020-12-29

## 2020-12-29 NOTE — TELEPHONE ENCOUNTER
[facility] Call Center    Phone Message    May a detailed message be left on voicemail: yes     Reason for Call: Other: Pt mom is requesting a call. She stated that the pt is not doing well and that his anxiety is way worse and is currently not being managed at the moment. Pt has been scheduled for 1/14. Pt mom can be reached at number provided.     Action Taken: Other: Sent to [facility]    Travel Screening: Not Applicable

## 2021-01-04 NOTE — TELEPHONE ENCOUNTER
[facility] Call [facility]    Phone Message    May a detailed message be left on voicemail: yes     Reason for Call: Other: Pt mom calling [facility] back. Can be reached at number provided.      Action Taken: Other: Sent to [facility]    Travel Screening: Not Applicable

## 2021-01-14 ENCOUNTER — HOSPITAL ENCOUNTER (INPATIENT)
Facility: CLINIC | Age: 21
LOS: 9 days | Discharge: HOME | End: 2021-01-23
Attending: PSYCHIATRY & NEUROLOGY | Admitting: PSYCHIATRY & NEUROLOGY
Payer: COMMERCIAL

## 2021-01-14 ENCOUNTER — HOSPITAL ENCOUNTER (EMERGENCY)
Facility: CLINIC | Age: 21
Discharge: PSYCHIATRIC OR SUBSTANCE USE FACILITY | End: 2021-01-14
Attending: PHYSICIAN ASSISTANT | Admitting: PHYSICIAN ASSISTANT
Payer: COMMERCIAL

## 2021-01-14 VITALS
DIASTOLIC BLOOD PRESSURE: 79 MMHG | TEMPERATURE: 98.1 F | OXYGEN SATURATION: 98 % | HEART RATE: 95 BPM | RESPIRATION RATE: 16 BRPM | SYSTOLIC BLOOD PRESSURE: 134 MMHG

## 2021-01-14 DIAGNOSIS — F29 PSYCHOSIS, UNSPECIFIED PSYCHOSIS TYPE (H): Primary | ICD-10-CM

## 2021-01-14 DIAGNOSIS — R45.851 SUICIDAL IDEATION: ICD-10-CM

## 2021-01-14 LAB
LABORATORY COMMENT REPORT: NORMAL
SARS-COV-2 RNA RESP QL NAA+PROBE: NEGATIVE
SPECIMEN SOURCE: NORMAL

## 2021-01-14 PROCEDURE — 90791 PSYCH DIAGNOSTIC EVALUATION: CPT

## 2021-01-14 PROCEDURE — 99285 EMERGENCY DEPT VISIT HI MDM: CPT | Mod: 25

## 2021-01-14 PROCEDURE — 124N000002 HC R&B MH UMMC

## 2021-01-14 PROCEDURE — 250N000013 HC RX MED GY IP 250 OP 250 PS 637: Performed by: CLINICAL NURSE SPECIALIST

## 2021-01-14 PROCEDURE — 87635 SARS-COV-2 COVID-19 AMP PRB: CPT | Performed by: PHYSICIAN ASSISTANT

## 2021-01-14 PROCEDURE — C9803 HOPD COVID-19 SPEC COLLECT: HCPCS

## 2021-01-14 RX ORDER — LACTOBACILLUS RHAMNOSUS GG 10B CELL
1 CAPSULE ORAL 2 TIMES DAILY
Status: DISCONTINUED | OUTPATIENT
Start: 2021-01-15 | End: 2021-01-23 | Stop reason: HOSPADM

## 2021-01-14 RX ORDER — HYDROXYZINE HYDROCHLORIDE 25 MG/1
25-50 TABLET, FILM COATED ORAL EVERY 4 HOURS PRN
Status: DISCONTINUED | OUTPATIENT
Start: 2021-01-14 | End: 2021-01-23 | Stop reason: HOSPADM

## 2021-01-14 RX ORDER — AMOXICILLIN 250 MG
1 CAPSULE ORAL 2 TIMES DAILY PRN
Status: DISCONTINUED | OUTPATIENT
Start: 2021-01-14 | End: 2021-01-23 | Stop reason: HOSPADM

## 2021-01-14 RX ORDER — ACETAMINOPHEN 325 MG/1
650 TABLET ORAL EVERY 4 HOURS PRN
Status: DISCONTINUED | OUTPATIENT
Start: 2021-01-14 | End: 2021-01-23 | Stop reason: HOSPADM

## 2021-01-14 RX ORDER — LEVOMEFOLATE CALCIUM 7.5 MG TABLET
7.5 TABLET DAILY
Status: DISCONTINUED | OUTPATIENT
Start: 2021-01-15 | End: 2021-01-23 | Stop reason: HOSPADM

## 2021-01-14 RX ORDER — TRAZODONE HYDROCHLORIDE 150 MG/1
150 TABLET ORAL
Status: DISCONTINUED | OUTPATIENT
Start: 2021-01-14 | End: 2021-01-15

## 2021-01-14 RX ORDER — MAGNESIUM HYDROXIDE/ALUMINUM HYDROXICE/SIMETHICONE 120; 1200; 1200 MG/30ML; MG/30ML; MG/30ML
30 SUSPENSION ORAL EVERY 4 HOURS PRN
Status: DISCONTINUED | OUTPATIENT
Start: 2021-01-14 | End: 2021-01-23 | Stop reason: HOSPADM

## 2021-01-14 RX ORDER — DIPHENHYDRAMINE HCL 25 MG
25 CAPSULE ORAL EVERY 6 HOURS PRN
Status: DISCONTINUED | OUTPATIENT
Start: 2021-01-14 | End: 2021-01-23 | Stop reason: HOSPADM

## 2021-01-14 RX ORDER — OLANZAPINE 10 MG/2ML
10 INJECTION, POWDER, FOR SOLUTION INTRAMUSCULAR 3 TIMES DAILY PRN
Status: DISCONTINUED | OUTPATIENT
Start: 2021-01-14 | End: 2021-01-15

## 2021-01-14 RX ORDER — OLANZAPINE 10 MG/1
10 TABLET ORAL 3 TIMES DAILY PRN
Status: DISCONTINUED | OUTPATIENT
Start: 2021-01-14 | End: 2021-01-15

## 2021-01-14 RX ORDER — AZATHIOPRINE 50 MG/1
100 TABLET ORAL DAILY
Status: DISCONTINUED | OUTPATIENT
Start: 2021-01-15 | End: 2021-01-23 | Stop reason: HOSPADM

## 2021-01-14 RX ADMIN — Medication 10 MG: at 22:50

## 2021-01-14 RX ADMIN — OLANZAPINE 10 MG: 10 TABLET, FILM COATED ORAL at 22:50

## 2021-01-14 RX ADMIN — TRAZODONE HYDROCHLORIDE 150 MG: 150 TABLET ORAL at 22:50

## 2021-01-14 ASSESSMENT — ACTIVITIES OF DAILY LIVING (ADL)
DIFFICULTY_COMMUNICATING: YES
FALL_HISTORY_WITHIN_LAST_SIX_MONTHS: NO
EATING/SWALLOWING_MANAGEMENT: OK
CONCENTRATING,_REMEMBERING_OR_MAKING_DECISIONS_DIFFICULTY: YES
HEARING_DIFFICULTY_OR_DEAF: NO
WEAR_GLASSES_OR_BLIND: NO
COMMUNICATION_MANAGEMENT: POOR
COMMUNICATION: DIFFICULTY SPEAKING;DIFFICULTY UNDERSTANDING
WALKING_OR_CLIMBING_STAIRS_DIFFICULTY: NO
TOILETING_ISSUES: NO
EATING/SWALLOWING: EATING
DRESSING/BATHING_DIFFICULTY: NO
DIFFICULTY_EATING/SWALLOWING: YES
PATIENT_/_FAMILY_COMMUNICATION_STYLE: SPOKEN LANGUAGE (ENGLISH OR BILINGUAL)
DOING_ERRANDS_INDEPENDENTLY_DIFFICULTY: YES

## 2021-01-14 ASSESSMENT — ENCOUNTER SYMPTOMS
AGITATION: 1
HALLUCINATIONS: 0
MYALGIAS: 0

## 2021-01-14 NOTE — ED NOTES
Bed: [facility]  Expected date:   Expected time:   Means of arrival:   Comments:  Triage sharpe

## 2021-01-14 NOTE — SAFE
Julian Berg  January 14, 2021    Admission to  was recommended and discussed with pt and he is in agreement.  He appears to be experiencing negative symptoms, appearing quite withdrawn, somewhat guarded and mildly catatonic at times.  He also describes feeling confused with difficulty focusing, hopeless, helpless and reports thoughts of suicide. Though he is voluntary, 72hr hold may be warranted should he change his mind (as he also appears mildly paranoid).       Current Suicidal Ideation/Self-Injurious Concerns/Methods: Other reports thoughts of multiple methods, but denies specific plan    Inappropriate Sexual Behavior: No    Aggression/Homicidal Ideation: Agitation/Hyperactivity      For additional details see full [facility] assessment.       Boyd Nieto, LYNNSW

## 2021-01-14 NOTE — ED PROVIDER NOTES
History   Chief Complaint:  Psychiatric Evaluation       HPI   Julian Berg is a 20 year old male with history of depression, psychosis, and MARTA who presents with depression. The patient's mother states that the patient began [facility] yesterday for his depression and anxiety. He reportedly failed out of the program after becoming agitated throughout the day. Per the nurse, his mother states that he had a flat affect with bouts of agitation and distress. On examination, the patient endorsed suicidal ideations without a plan. He states that he has a history of intermittent binge drinking and marijuana use, but denies any use today. The patient is not currently treating with a therapist and is unsure where his prescribed medications are refilled. Denies any hallucinations or other complaints.     Review of Systems   Musculoskeletal: Negative for myalgias.   Psychiatric/Behavioral: Positive for agitation and suicidal ideas. Negative for hallucinations and self-injury.   All other systems reviewed and are negative.    \    Allergies:  No known drug allergies     Medications:  Humira   Zyrtec   Azathioprine   Epinephrine   Gabapentin   Culturell     Past Medical History:    Colitis   Psychosis   Crohn's disease   MARTA  Depression     Past Surgical History:     surgery     Social History:  Patient lives with his mother   Arrives alone     Physical Exam     Patient Vitals for the past 24 hrs:   BP Temp Temp src Pulse Resp SpO2   01/14/21 1731 134/78 -- -- 76 16 100 %   01/14/21 1355 -- -- -- -- -- 99 %   01/14/21 1350 (!) 144/83 -- -- 66 -- --   01/14/21 1346 136/77 98.1  F (36.7  C) Oral 89 18 98 %       Physical Exam  General: Flat affect.   Head:  Scalp is atraumatic.            Neck:  Normal range of motion.   CV:  Normal rate. No murmur. 2+ radial pulses  Resp:  Breath sounds are clear bilaterally. Non-labored, no retractions or accessory muscle use.  GI:  Abdomen is soft, no distension, no tenderness.    MS:  Normal range of motion. No acute deformities.   Skin:  Warm and dry. No rash.   Neuro:  Alert. Strength and sensation grossly intact.   Psych:  Awake. Alert. Flat affect. Notes suicidal thoughts, though denies an active plan.     Emergency Department Course   Laboratory:  Asymptomatic COVID-19 Virus by PCR Nasopharyngeal swab: pending      Emergency Department Course:    Reviewed:  I reviewed nursing notes, vitals, past medical history and care everywhere    Assessments:  1409 I obtained history and examined the patient as noted above.     Consults:   5467 I discussed the patient with Caron from [facility], who recommends admission   1800 I discussed the patient with Dr. Carmichael who agrees to share service.     Disposition:  Transfer to [facility] under the care of Dr. Castillo.       Impression & Plan   Medical Decision Making:  Julian Berg is a 20 year old male with a medical history including psychosis, depression presents emergency department with suicidal ideation.  Patient has a flat affect on my examination.  He reports suicidal thoughts, though denies an active plan.  No physical complaints and has a reassuring physical examination.  Patient has failed outpatient management of his symptoms and Caron, from [facility], agrees with plan for psychiatric admission.  Patient is medically cleared and appropriate for admission.  Patient will be transferred to [facility] under the care of Dr. Castillo.  transferred via EMS.  He remained cooperative and hemodynamically stable throughout his emergency department stay.    Covid-19  Julian Berg was evaluated during a global COVID-19 pandemic, which necessitated consideration that the patient might be at risk for infection with the SARS-CoV-2 virus that causes COVID-19.   Applicable protocols for evaluation were followed during the patient's care.   COVID-19 was considered as part of the patient's evaluation. The plan for testing is:  a test was  obtained during this visit.    Diagnosis:    ICD-10-CM    1. Suicidal ideation  R45.851        Scribe Disclosure:  I, Keisha Flannery, am serving as a scribe at 1:44 PM on 1/14/2021 to document services personally performed by Zahra Covington PA-C based on my observations and the provider's statements to me.           Zahra Covington PA-C  01/14/21 1828

## 2021-01-15 LAB
ALBUMIN SERPL-MCNC: 4.3 G/DL (ref 3.4–5)
ALP SERPL-CCNC: 49 U/L (ref 40–150)
ALT SERPL W P-5'-P-CCNC: 19 U/L (ref 0–70)
ANION GAP SERPL CALCULATED.3IONS-SCNC: 7 MMOL/L (ref 3–14)
AST SERPL W P-5'-P-CCNC: 17 U/L (ref 0–45)
BASOPHILS # BLD AUTO: 0 10E9/L (ref 0–0.2)
BASOPHILS NFR BLD AUTO: 0.5 %
BILIRUB SERPL-MCNC: 1.2 MG/DL (ref 0.2–1.3)
BUN SERPL-MCNC: 15 MG/DL (ref 7–30)
CALCIUM SERPL-MCNC: 9.5 MG/DL (ref 8.5–10.1)
CHLORIDE SERPL-SCNC: 103 MMOL/L (ref 94–109)
CHOLEST SERPL-MCNC: 198 MG/DL
CO2 SERPL-SCNC: 30 MMOL/L (ref 20–32)
CREAT SERPL-MCNC: 0.9 MG/DL (ref 0.66–1.25)
DIFFERENTIAL METHOD BLD: NORMAL
EOSINOPHIL # BLD AUTO: 0.2 10E9/L (ref 0–0.7)
EOSINOPHIL NFR BLD AUTO: 3 %
ERYTHROCYTE [DISTWIDTH] IN BLOOD BY AUTOMATED COUNT: 12.1 % (ref 10–15)
GFR SERPL CREATININE-BSD FRML MDRD: >90 ML/MIN/{1.73_M2}
GLUCOSE SERPL-MCNC: 98 MG/DL (ref 70–99)
HCT VFR BLD AUTO: 47.4 % (ref 40–53)
HDLC SERPL-MCNC: 75 MG/DL
HGB BLD-MCNC: 16.3 G/DL (ref 13.3–17.7)
IMM GRANULOCYTES # BLD: 0 10E9/L (ref 0–0.4)
IMM GRANULOCYTES NFR BLD: 0.1 %
LDLC SERPL CALC-MCNC: 107 MG/DL
LYMPHOCYTES # BLD AUTO: 2.9 10E9/L (ref 0.8–5.3)
LYMPHOCYTES NFR BLD AUTO: 36 %
MCH RBC QN AUTO: 32.5 PG (ref 26.5–33)
MCHC RBC AUTO-ENTMCNC: 34.4 G/DL (ref 31.5–36.5)
MCV RBC AUTO: 94 FL (ref 78–100)
MONOCYTES # BLD AUTO: 0.7 10E9/L (ref 0–1.3)
MONOCYTES NFR BLD AUTO: 9.2 %
NEUTROPHILS # BLD AUTO: 4.1 10E9/L (ref 1.6–8.3)
NEUTROPHILS NFR BLD AUTO: 51.2 %
NONHDLC SERPL-MCNC: 123 MG/DL
NRBC # BLD AUTO: 0 10*3/UL
NRBC BLD AUTO-RTO: 0 /100
PLATELET # BLD AUTO: 284 10E9/L (ref 150–450)
POTASSIUM SERPL-SCNC: 3.9 MMOL/L (ref 3.4–5.3)
PROT SERPL-MCNC: 7.8 G/DL (ref 6.8–8.8)
RBC # BLD AUTO: 5.02 10E12/L (ref 4.4–5.9)
SODIUM SERPL-SCNC: 140 MMOL/L (ref 133–144)
TRIGL SERPL-MCNC: 82 MG/DL
TSH SERPL DL<=0.005 MIU/L-ACNC: 1.42 MU/L (ref 0.4–4)
WBC # BLD AUTO: 8 10E9/L (ref 4–11)

## 2021-01-15 PROCEDURE — 84443 ASSAY THYROID STIM HORMONE: CPT | Performed by: CLINICAL NURSE SPECIALIST

## 2021-01-15 PROCEDURE — 250N000012 HC RX MED GY IP 250 OP 636 PS 637: Performed by: CLINICAL NURSE SPECIALIST

## 2021-01-15 PROCEDURE — 90853 GROUP PSYCHOTHERAPY: CPT

## 2021-01-15 PROCEDURE — 85025 COMPLETE CBC W/AUTO DIFF WBC: CPT | Performed by: CLINICAL NURSE SPECIALIST

## 2021-01-15 PROCEDURE — 80053 COMPREHEN METABOLIC PANEL: CPT | Performed by: CLINICAL NURSE SPECIALIST

## 2021-01-15 PROCEDURE — 80061 LIPID PANEL: CPT | Performed by: CLINICAL NURSE SPECIALIST

## 2021-01-15 PROCEDURE — 124N000002 HC R&B MH UMMC

## 2021-01-15 PROCEDURE — 250N000013 HC RX MED GY IP 250 OP 250 PS 637: Performed by: CLINICAL NURSE SPECIALIST

## 2021-01-15 PROCEDURE — 36415 COLL VENOUS BLD VENIPUNCTURE: CPT | Performed by: CLINICAL NURSE SPECIALIST

## 2021-01-15 PROCEDURE — 99223 1ST HOSP IP/OBS HIGH 75: CPT | Mod: AI | Performed by: CLINICAL NURSE SPECIALIST

## 2021-01-15 RX ORDER — GABAPENTIN 100 MG/1
100 CAPSULE ORAL 3 TIMES DAILY PRN
Status: DISCONTINUED | OUTPATIENT
Start: 2021-01-15 | End: 2021-01-23 | Stop reason: HOSPADM

## 2021-01-15 RX ORDER — OLANZAPINE 5 MG/1
5-10 TABLET ORAL 3 TIMES DAILY PRN
Status: DISCONTINUED | OUTPATIENT
Start: 2021-01-15 | End: 2021-01-23

## 2021-01-15 RX ORDER — OLANZAPINE 10 MG/2ML
10 INJECTION, POWDER, FOR SOLUTION INTRAMUSCULAR 3 TIMES DAILY PRN
Status: DISCONTINUED | OUTPATIENT
Start: 2021-01-15 | End: 2021-01-23

## 2021-01-15 RX ORDER — QUETIAPINE FUMARATE 100 MG/1
100 TABLET, FILM COATED ORAL
Status: DISCONTINUED | OUTPATIENT
Start: 2021-01-15 | End: 2021-01-23 | Stop reason: HOSPADM

## 2021-01-15 RX ORDER — OLANZAPINE 10 MG/1
10 TABLET, ORALLY DISINTEGRATING ORAL AT BEDTIME
Status: DISCONTINUED | OUTPATIENT
Start: 2021-01-15 | End: 2021-01-23

## 2021-01-15 RX ADMIN — LEVOMEFOLATE CALCIUM 7.5 MG TABLET 7.5 MG: TABLET at 10:58

## 2021-01-15 RX ADMIN — OLANZAPINE 10 MG: 10 TABLET, ORALLY DISINTEGRATING ORAL at 21:33

## 2021-01-15 RX ADMIN — Medication 10 MG: at 21:33

## 2021-01-15 RX ADMIN — AZATHIOPRINE 100 MG: 50 TABLET ORAL at 10:58

## 2021-01-15 RX ADMIN — Medication 1 CAPSULE: at 20:39

## 2021-01-15 RX ADMIN — Medication 1 CAPSULE: at 10:58

## 2021-01-15 ASSESSMENT — ACTIVITIES OF DAILY LIVING (ADL)
DRESS: INDEPENDENT
ORAL_HYGIENE: INDEPENDENT
ORAL_HYGIENE: INDEPENDENT
HYGIENE/GROOMING: INDEPENDENT
DRESS: INDEPENDENT
LAUNDRY: WITH SUPERVISION
HYGIENE/GROOMING: INDEPENDENT
LAUNDRY: WITH SUPERVISION

## 2021-01-15 NOTE — H&P
"Admitted:     01/14/2021      CHIEF COMPLAINT:  Evaluation for possible psychosis.      IDENTIFYING INFORMATION:  Julian Berg is a 20-year-old single  male presenting with a history of psychosis, depression, general anxiety disorder and substance abuse presenting for evaluation of agitation, suicidal ideaiton and possible psychosis..      HISTORY OF PRESENT ILLNESS:  Julian Berg is a 20-year-old single  male presenting with a history of psychosis, depression, general anxiety disorder and substance abuse who is presenting with possible psychosis and suicidal ideation.  The patient was brought in by mother to the [facility] Emergency Room.  The patient was at the [facility] day treatment.  The patient was not able to tolerate day treatment, and it was suggested that mother bring him to the emergency room for further evaluation.  The patient signed a release of information for mother.  Mother is reporting that the patient put all of his money into Bitcoin.  This is including all of his college funds.  Mother is suspicious that patient may have been involved in some illegal activities.  Mother reports that when patient was talking to her, he used the word counterfeit.  The patient has been stealing items and alcohol from friends and from friends' parents.  The patient made the statement, \"I have lied my whole life.\"  The patient thinks he is going to assisted.  The patient has been stealing from [employer] and from [employer].  Mother reports the reason why he came to the hospital is because he stated, \"I can't live anymore because of what I have done.\"  The patient was researching ways in order to kill himself.  The patient is experiencing thought blocking.  The patient has a lot of difficulty answering questions.  When asked the question, there will be a long pause, and then patient forgets what the question is, repeated, another long pause, and then patient does not answer the question.  Goal for this " "hospitalization is to stabilize mood and psychological testing for diagnosis clarification.      PSYCHIATRIC REVIEW OF SYSTEMS:  The patient is depressed.  He has poor ADLs, poor motivation, poor focus.  The patient is experiencing thought blocking.  The patient is positive for suicidal ideation.  The patient made the statement that he cannot live anymore because what is done.  He had been researching ways to kill himself.  The patient is reporting he is having \"lots of ideas go through my head.\"  The patient is experiencing very poor sleep.  He is vacillating between being very withdrawn, almost nonresponsive to agitated.  Patient is also experiencing paranoia.  The patient made the statement in the emergency room that he \"hears something.\"  When asked about this, the patient stated, \"I never said that.\"  The patient became very suspicious of the provider.  The patient denies visual hallucinations.  The patient is experiencing paranoia.  The patient is very anxious.  Denies any symptoms of PTSD, eating disorder or OCD.      PSYCHIATRIC HISTORY:  The patient has a history of anxiety.  He has been to the emergency room twice due to his anxiety in 12/2019 through 01/2020.  The patient was assessed at [facility].  At that time, cognitive testing and genetic testing was done.  The patient started medication management with Aga Dumont.  The patient has had trials of Celexa, Lexapro, Zoloft, gabapentin, trazodone and melatonin.  The patient currently is on melatonin and trazodone, which have not been effective for sleeping.  The patient had psychological testing at [facility] with Dr. Claudia Arambula in 12/2019.      PAST MEDICAL HISTORY:  The patient has a history of Crohn's disease. Reviewed admission labs: unremarkable, COVID screen is negative.     ALLERGIES:  THE PATIENT IS ALLERGIC TO PEANUTS, TREE NUTS, DUST AND POLLEN.      SUBSTANCE ABUSE HISTORY:  U-tox is pending.  The " patient has a history of vaping THC.  He is a daily user of THC.  The patient reports binge drinking.  The patient states he has blacked out.  He was unable to name the amount of times he has blacked out.  The patient has not been to detox.  The patient reports that he has abused Adderall by crushing and snorting it.  The patient has used acid in high school approximately 6 times.  The patient is a nicotine user.      FAMILY HISTORY:  Maternal grandmother endorsed OCD.  Parents are .  The patient denies any abuse history.  The patient has a twin brother.      SOCIAL HISTORY:  The patient is living at home with mother.  He graduated from [employer].  The patient took medical leave from the [employer] [facility] [employer].  He was accepted into the engineering program.  The patient was thinking about transferring to [place].  Patient denies any legal issues.      MEDICAL REVIEW OF SYSTEMS:  All systems reviewed and are negative except for psychiatric agitation and suicidal ideation.      PHYSICAL EXAMINATION:   VITAL SIGNS:  Blood pressure 134/78, temperature 98.1 Fahrenheit, pulse 76, respirations 16, SpO2 at 100%.        Reviewed documentation for physical examination completed by KIMBERLEY Corral, dated 01/14/2021.  No changes noted.      MENTAL STATUS EXAMINATION:  The patient appears older than his stated age.  He is dressed in scrubs.  The patient is very thin.  The patient had to hold his scrubs at the waist line, otherwise, they would fall down because he is so thin.  Patient was cooperative in accompanying provider to the interview room.  Eye contact was poor.  The patient either stared at his reflection in the window or was staring at his hands.  He did not display psychomotor abnormalities.  Speech was very latent.  The patient appeared to be suffering from thought blocking, long pauses, minimal answers to questions.  The patient spoke in a very, very soft volume.  Mood is described as depressed.   Affect blunted, congruent.  Thought process disorganized.  Associations appeared to be loosening.  Thought content displayed some evidence of psychosis.  The patient may be experiencing auditory hallucinations.  The patient is reporting passive suicidal thinking, no active intent at this time.  Denies homicidal thinking.  Insight and judgment are impaired.  Cognition appears intact to interviewing including orientation person, place, time and situation, use of language and fund of knowledge.  Recent and remote memory are grossly intact.  Muscle strength, tone and gait appear within normal limits upon observation.      ASSESSMENT:   1. Unspecified psychosis    2.  Rule out substance induced psychosis  3.  Major depressive disorder, recurrent, severe with psychosis.   4.  Cannabis use disorder, severe.   5.  Nicotine dependence.   6.  Crohn's disease.      PLAN:   1.  The patient has been admitted to behavioral unit 4A a voluntary basis.  The patient is cooperative at this time.  The patient is holdable.  The patient would be at very high risk of self-harm if he were to leave and should be put on a 72-hour hold.   2.  Discussed medications with the patient.  The patient will start Zyprexa 10 mg to address disorganization and mood instability.  Discussed medications with both patient and with mother.  The patient will continue with his Crohn's medication of Humira, Imuran and Culturelle.  The patient will continue with melatonin for sleep and Deplin 7.5 mg for Crohn's disease.  The patient can use Seroquel for sleep.   3.  Psychosocial treatments to be addressed with CTC.   4.  Discussed treatment plan with mom.  The patient will stabilize with Zyprexa and will pursue psychological testing for diagnosis clarification. Patient would benefit from MICD treatment. Reviewed documents mother sent regarding gene site testing, medication trials and and previous treatments.   5.  Estimated length of stay is 3-5 days.          DEBRA A. NAEGELE, SANDRA, CNS             D: 01/15/2021   T: 01/15/2021   MT: CHANDLER      Name:     TARYN TOLEDO   MRN:      [id number]        Account:      [id number]   :      2000        Admitted:     2021                   Document: [id number]       cc: Jairon Hawk MD

## 2021-01-15 NOTE — PLAN OF CARE
Work Completed:Team meeting, Chart review, Psychosocial, team note. Writer spoke with pt's mother who had questions regarding after care options. Writer spoke with her about IRTS and mi/cd treatment. Writer also informed pt's mother that his is indeed in a single room as well. Pt's mother was concerned due to pt's Chron's.     Discharge plan or goal: medication management and therapy                 Barriers to discharge: stabilization.

## 2021-01-15 NOTE — PROGRESS NOTES
NOC Shift Report    Pt in bed at beginning of shift, breathing quiet and unlabored. Pt slept through shift. Pt slept 7 hours.     No pt complaints or concerns at this time.     No PRNs given. Will continue to monitor.

## 2021-01-15 NOTE — PROGRESS NOTES
Pt.slept in until about 1015. He was out for breakfast and medications. Spent a while on the phone making/answering phone calls. He continues to appear flat, slow in speech and movement, and not engaging in discussions or questions. It took him a long while to complete his menu. He was later isolative/withdrawn to his room. He was compliant with his scheduled medications. No medication adverse side effects observed or reported. He did not need prn medication or seclusion/restraint to manage behavior. Will continue to monitor.

## 2021-01-15 NOTE — PLAN OF CARE
"  Problem: Psychotic Symptoms  Goal: Psychotic Symptoms  Description: Signs and symptoms of listed problems will be absent or manageable.  Outcome: No Change  ADMIT:   This is the first admit for 4a for this pt who arrived 2120 tonight from [facility].   B: this 19 yo Pt with hx of depression with psychosis and Crohn's disease was BIB his mother to the ED for evaluation of a mental health problem. Pt's mother reports that pt had started day tx at [facility] recently but was sent home early due to periods of catatonia and agitation. Mother reports that for the past few days the pt has been \"out of it\" in the mornings and gets increasingly agitated in the evenings. In the ED the pt is mildly catatonic, as he presents as this way also here on 4a upon his arrival. he is slow to answer, mute, almost catatonic, disorganized, and exhibits some thought blocking. Pt denies hallucinations but stated that he \"hears something in his mind\". He presents as preoccupied while this RN was attempting to talk with him. He was unable to elaborate further. Pt reports that he has not been sleeping well and has only been sleeping 1-4 hours per night. Mom (aj) states she gives pt 10 mg melatonin & 150 mg trazodone. He admits to having confusion and difficulty focusing. Pt reports having SI with no specific plan altho when this question was asked, he shook his head no.  Pt denies current drug use but mom states he has been heavy into the pot smoking and vaping cbd and thc for along time. Mom states he can be very wired, hyper, not sleeping and she thought he may be bipolar. Per mom, pt has a Hx of stealing from [employer]. He cheats in school and states pt has revealed this to her recently. His friends accuse him of taking things. Mom states he is paranoid, very uncertain of self. Pt has had an assessment with a strengths program and he had genetic testing to determine what meds would possibly help him.   In November of 2020, pt " "had personality assessment and the Dx was adjustment disorder with anxiety. when pt is frustrated, he can hit his head and bang his arms. Per mom, he lies a lot about school and he actually states he is always vaping. Mom is worried about brain damage thru drug use of the past years. 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 These are things that mom has found out and Herminio has told her.   Pt has poor sleeping  and ADL's. Pt is not talking to this staff but he did sign himself in vol and an NANDA for mom (mona) who has told this RN all this information about Herminio. (Mom, Mona is the main historian of information for Herminio)  She talked to this RN tonight for 40 minutes.  Pt remains in his room, in scrubs but couldn't understand how to take old scrubs off and replace them.   He is silent, catatonic, mute but can be somewhat redirectable altho giving pt zyprexa, melatonin and trazodone at 2300 tonight took some effort from the RN.  He is mumbling to self tonight so may have some AH.  He is asymptomatic for COVID-19. Medically cleared.  Will continue to assess.     A: Voluntary.                     "

## 2021-01-15 NOTE — ED PROVIDER NOTES
Emergency Department Attending Supervision Note  1/14/2021  6:10 PM      I evaluated this patient in conjunction with Zahra Covington PA-C      Briefly, the patient presented as a referral from his inpatient intensive day program at [facility] he is becoming increasingly depressed and anxious and was agitated therefore referred here.  He relays suicidal ideation but no plan.      On my exam:  /78   Pulse 76   Temp 98.1  F (36.7  C) (Oral)   Resp 16   SpO2 100%    General: Alert interactive  Cardiovascular: Well-perfused  Lungs: No respiratory distress  Psych: Flat affect    Results:  Labs Ordered and Resulted from Time of ED Arrival Up to the Time of Departure from the ED   SARS-COV-2 (COVID-19) VIRUS RT-PCR   PATIENT CARE ORDER   NOTIFY PROVIDER       ED course:  Following presentation history and physical examination was performed, patient was evaluated by her mental health  and feeling was patient would benefit from inpatient psychiatric hospitalization.  Patient is in agreement with this plan of action.  There is no signs of active infection or intoxication and I find him to be medically cleared for psychiatric admission, a bed has been obtained at [facility] under the care of Dr. Castillo on station 4A.  Patient will be transported via EMS.        Diagnosis    ICD-10-CM    1. Suicidal ideation  R45.851 Asymptomatic SARS-CoV-2 COVID-19 Virus (Coronavirus) by PCR                Trigger, Zhao De La O MD  01/14/21 2032

## 2021-01-15 NOTE — PROGRESS NOTES
01/14/21 3160   Patient Belongings   Did you bring any home meds/supplements to the hospital?  Yes  (Medication given to nurse)   Disposition of meds  Other (see comment)   Patient Belongings locker;other (see comments)   Patient Belongings Put in Hospital Secure Location (Security or Locker, etc.) cell phone/electronics;clothing;keys;purse/wallet;shoes;other (see comments)   Belongings Search Yes   Clothing Search Yes   Second Staff Miki Kirkland RN   Belongings in Locker:   Black cell phone (cracked screen), maroon hoodie with strings, 1 pair of socks, tan/white striped t-shirt, 1 blue jeans, gray jacket, white  cord, 1  plug, white eye mask, 1 pair earplugs, 1 lip balm, lanyard with key chain, 1 black wallet,  2 [employer] cards, 2 school ID's, [place] instruction permit, [place]rivers License    ADDENDUM: Mom came and took patients phone and keys with patient's consent. Clothes also swapped out. Mom took all of patient's clothes from admission except for jacket. Mom brought in a green crew neck sweatshirt, pajama pants, three pairs of underwear and t-shirt.    Belongings in with Patient:   1 underwear    *Medication given to nurse  ..A               Admission:  I am responsible for any personal items that are not sent to the safe or pharmacy.  [facility] is not responsible for loss, theft or damage of any property in my possession.    Signature:  _________________________________ Date: _______  Time: _____                                              Staff Signature:  ____________________________ Date: ________  Time: _____      2nd Staff person, if patient is unable/unwilling to sign:    Signature: ________________________________ Date: ________  Time: _____     Discharge:  [facility] has returned all of my personal belongings:    Signature: _________________________________ Date: ________  Time: _____                                          Staff Signature:  ____________________________  Date: ________  Time: _____

## 2021-01-15 NOTE — PHARMACY-ADMISSION MEDICATION HISTORY
Admission medication history interview status for the 1/14/2021 admission is complete. See Epic admission navigator for allergy information, pharmacy, prior to admission medications and immunization status.     Medication history interview sources:  Patient, [facility]    Changes made to PTA medication list (reason)  Added: Nothing.  Deleted:   -Cetirizine 10 mg, take 1 tablet by mouth daily.    -Diphenhydramine 25 mg, take 1 capsule by mouth every 6 hours as needed for itching or allergies.    -Epinephrine 0.3 mg/0.3 mL injection 2 pack,. Inject 0.3 mg into the muscle as needed for anaphylaxis.    -Gabapentin 100 mg capsule, take 1-3 capsules by mouth 3 times daily as needed (anxiety).   -Culturell capsule, take 1 capsule by mouth 2 times daily.   Changed: Added Mondays as the Humira injection day per patient.     Additional medication history information (including reliability of information, actions taken by pharmacist): Patient had a difficult time recalling when the last time he took his medications were prior to admission. Patient is confident he took his Humira this past Monday, 1/11/21. Per [facility], zolpidem 10 mg and trazodone 100 mg and 50 mg were prepared and or dispensed by [facility] at the beginning of this month. Patient is unsure if he took these yet. He could recall the trazodone 50 mg but he is unsure if he took it or not. Patient also reports taking a multivitamin, methyl folate and Vit D3. Patient was unsure of strength and dose of these supplements at this time.        Prior to Admission medications    Medication Sig Last Dose Taking? Auth Provider   adalimumab (HUMIRA) 40 MG/0.8ML prefilled syringe kit Inject 40 mg Subcutaneous Every Monday Past Week at Unknown time Yes Reported, Patient   azaTHIOprine 100 MG TABS Take 100 mg by mouth daily  Past Week at Unknown time Yes Reported, Patient         Medication history completed by: Katina Bills, PharmD Student

## 2021-01-15 NOTE — PLAN OF CARE
BEHAVIORAL TEAM DISCUSSION    Participants: 4A Provider: Debra Naegele, APRN, CNS; 4A RN's:  Kelly RN; 4A CTC's: Dago Jackson (CTC).  Progress: No Change.  Continued Stay Criteria/Rationale: psychosis  Medical/Physical: None  Precautions:    Behavioral Orders   Procedures    Code 1 - Restrict to Unit    Routine Programming     As clinically indicated    Single Room     Psychotic and parnaoid    Status 15     Every 15 minutes.     Plan: The following services will be provided to the patient; psychiatric assessment, medication management, therapeutic milieu, individual and group support, art therapy, and skills/OT groups.   Rationale for change in precautions or plan: N/A

## 2021-01-15 NOTE — PLAN OF CARE
"INITIAL PSYCHOSOCIAL ASSESSMENT    I have reviewed the chart and interviewed the patient.     Presenting Problem  Per ED provider note, \"Julian Berg is a 20 year old male with history of depression, psychosis, and MARTA who presents with depression. The patient's mother states that the patient began [facility] yesterday for his depression and anxiety. He reportedly failed out of the program after becoming agitated throughout the day. Per the nurse, his mother states that he had a flat affect with bouts of agitation and distress. On examination, the patient endorsed suicidal ideations without a plan. He states that he has a history of intermittent binge drinking and marijuana use, but denies any use today. The patient is not currently treating with a therapist and is unsure where his prescribed medications are refilled. Denies any hallucinations or other complaints. \"  Zahra Covington PA-C 01/14/20201          Orders Placed This Encounter      Voluntary    Is patient under a civil commitment/legal guardian?  No    History of Mental Illness and Chemical Health History  Pt has a hx of Anxiety, depression and psychosis. Pt has a hx of multiple ed visits. Pt has participated in  Strengths program and [facility]'s day treatment program.     Family Description(Constellation, family psychiatric hx)  Pt was born and raised in [place]. Pt's parents are  and one parent is remarried. Pt has a twin brother and a step brother. Pt was in as relationship that ended in December. Pt does not have any children. Pt's maternal grandmother has a hx of OCD. Pt states \"i'm not sure what each person has\".     Significant Life Events (Trauma/Ilness/Death)  \"I have traumatic experiences\". Pt has Chron's disease.     Living Situation   w/mother    Criminal hx and Legal Issues  Denies    Ethnic/Cultural Issues  The patient does not identify any ethnic or cultural issues that impact treatment    Spiritual Orientation  Was raised " Quaker- agnostic somewhat     Service History  Denies     Educational/Financial/Occupational  Some college was ft student at [employer]/unemployed     Social functioning (organization, interests)   Watching tv, video grames, playing sports    Current Health Care Providers  Medication Management:   Therapist:   Primary Care:   :   [facility]/Home Health nurse:   Home Health Nurse:        Social Service Assessment/Plan    Patient would benefit from medication management and therapy.   CTC will consult with treatment team for additional treatment recommendations.  CTC will schedule appointments with outpatient providers for follow-up post discharge.   Patient will continue to receive therapeutic support while hospitalized and is encouraged to attend therapies on the unit

## 2021-01-16 PROCEDURE — 250N000013 HC RX MED GY IP 250 OP 250 PS 637: Performed by: CLINICAL NURSE SPECIALIST

## 2021-01-16 PROCEDURE — 250N000012 HC RX MED GY IP 250 OP 636 PS 637: Performed by: CLINICAL NURSE SPECIALIST

## 2021-01-16 PROCEDURE — H2032 ACTIVITY THERAPY, PER 15 MIN: HCPCS

## 2021-01-16 PROCEDURE — 124N000002 HC R&B MH UMMC

## 2021-01-16 RX ADMIN — OLANZAPINE 10 MG: 10 TABLET, ORALLY DISINTEGRATING ORAL at 21:25

## 2021-01-16 RX ADMIN — Medication 10 MG: at 21:25

## 2021-01-16 RX ADMIN — Medication 1 CAPSULE: at 19:29

## 2021-01-16 RX ADMIN — LEVOMEFOLATE CALCIUM 7.5 MG TABLET 7.5 MG: TABLET at 08:44

## 2021-01-16 RX ADMIN — Medication 1 CAPSULE: at 08:44

## 2021-01-16 RX ADMIN — AZATHIOPRINE 100 MG: 50 TABLET ORAL at 08:44

## 2021-01-16 NOTE — PROGRESS NOTES
"01/15/21      Groups 1 hour   Details    (Psychotherapy -  \"I am\" - identity and self esteem conversation/ creative expression)   The  Psychotherapy group goal is to promote insight to positive choice and change. Group processing is within a supportive and safe environment. Patients will process emotions using verbal group and expressive psychotherapy interventions including visual art/writing interventions.     Group interventions support patients by : cultivating resilience, fostering self awareness, self expression, self esteem and self compassion.  Groups will provide tools to encourage self and others in group, to practice communication/ social skills and supports, learn positive coping mechanisms, self efficacy, conflict resolution, empowerment, optimism ,hope , understanding ones emotions,and a sense of self and community.  Tools will be provided to manage life stressors  and individual's diagnosis      Subjective -patient report of mood today-\"indecisive\"     Objective/ Intervention- Goal of group -\"I am\" - identity and self esteem conversation/ creative expression)     Group Response- 6 members     Patient Response-Pt  Did a drawing of himself crossing the bridge from [place] to [facility], he said he is on medical leave from school. He was focused on having made bad choices and wasn't sure he would get back to school.  Pt was pleasant, cooperative and  engaged.     After group writer was doing a 1:1 meeting with another pt and this pt asked if he could meet with writer. Writer returned to meet with him for a short time. He was concerned about how long he is here. Writer tried to talk to him about settling into the weekend as no decisions would be made prior to next week. He didn't seem to have insight about his mental health other than having insight that he had \"made some poor choices.\" He was calm and writer made some suggestions as to how he could occupy his time this weekend. He was interested in " inviting peers to play ping pong over the weekend.        Tavo Kolb, NITAFT, ATR-BC

## 2021-01-16 NOTE — PLAN OF CARE
"  Problem: Psychotic Symptoms  Goal: Psychotic Symptoms  Description: Signs and symptoms of listed problems will be absent or manageable.  Outcome: Improving  Flowsheets (Taken 1/15/2021 0526)  Psychotic Symptoms Assessed: all  Psychotic Symptoms Present:   affect   mood   insight  Patient was out on the milieu and interacted appropriately with peers and staff. He had a flat, blunted affect but was calm and pleasant when engaged. He denied all mental health symptoms but anxiety, rating it as 4/10 and describing it as \"manageable.\" He was compliant with meals and medications and attended groups. He is reportedly sleeping \"good\" and eating \"with no problem.\" Patient lacks insight into his mental health condition and minimizes it as well. He told writer that his mom \"is just making stuff up\" and \"everything I told the lady last night is not a true representation of me.\" No medication side-effect noted and patient remained safe.  "

## 2021-01-16 NOTE — PLAN OF CARE
"Nursing Assessment continues..    Patient had uneventful shift. Patient made a lot phone calls today but has been pretty calm and cooperative through out the shift. Little or no peer's interaction or socialization was observed during this shift. Patient has been isolative to himself in his own space.     Patient rates depression and anxiety as 10/10 though casting a lot of doubts to his responds. Patient says much of his depression and anxiety is for him being in the hospital. Patient denies SI/SIB and AH/VH. Patient was offered Hydroxyzine for anxiety but he refused.     Patient takes time to respond to check-in questions. Patient responds to questions as if he is unsure whether he is right or not \"I don't know if I should change my answers or not.\"      Appetite: Good. Ate 100% of dinner.     PRNs: None.   "

## 2021-01-16 NOTE — PLAN OF CARE
Problem: Depressive Symptoms  Goal: Depressive Symptoms  Description: Signs and symptoms of listed problems will be absent or manageable.  1/16/2021 1132 by Marcio Vu RN  Outcome: No Change  Flowsheets (Taken 1/16/2021 1132)  Depressive Symptoms Assessed:    affect    mood  Depressive Symptoms Present:    affect    mood  1/16/2021 1131 by Marcio Vu RN  Outcome: No Change     Problem: Suicidal Behavior  Goal: Suicidal Behavior is Absent or Managed  Outcome: Improving  Flowsheets (Taken 1/16/2021 1132)  Mutually Determined Action Steps (Suicidal Behavior Absent/Managed): (Denies) shares suicidal thoughts     NURSING ASSESSMENT:  Patient is assessed for suicidal risk and mental health symptoms. Patient was awake 0845 for breakfast and medications. Patient is med compliant and ate about 80% of his breakfast. Patient is visible in the milieu but isolative to self with minimal interaction with peers. Patient's affect is flat and blunted, mood is calm.   Patient endorse depression and anxiety but unable to rate it. Patient denies SI/SIB right and AH/VH. Patient has no negative behaviors this shift.     Patient has an order for 2 pm snack (gold fish crackers, yogurt and sting cheese at 2 pm snack).      Mom called to check on patient and was worried that patient was going to be bored if he was just sitting here and have nothing to do. Mom was updated on patient's status and the various resources we have on the unit to keep patient busy.     Appetite= good     Sleep= good      Will continue with plan of care.      PRNS this shift None

## 2021-01-16 NOTE — PROGRESS NOTES
"CLINICAL NUTRITION SERVICES - ASSESSMENT NOTE     Nutrition Prescription    RECOMMENDATIONS FOR MDs/PROVIDERS TO ORDER:  None at this time     Malnutrition Status:    Patient does not meet two of the established criteria necessary for diagnosing malnutrition    Recommendations already ordered by Registered Dietitian (RD):  - will send  pm snack of gold fish crackers, yogurt and sting cheese   Future/Additional Recommendations:  -monitor po intake and weights        REASON FOR ASSESSMENT  Julian Berg is a/an 20 year old male assessed by the dietitian for Provider Order -Crohns disease    Patient is a 20-year-old single  male presenting with a history of psychosis, depression, general anxiety disorder and substance abuse presenting for evaluation of agitation, suicidal ideaiton and possible psychosis.     NUTRITION HISTORY  -Spoke with pt today over the phone. Pt states his appetite and intake has been less then normal lately, however could not state duration.   -Pt denies any Crohn's flairs and has not had a BM since admit.   - Pt unsure about weight loss, however reports a UBW of ~180lbs     CURRENT NUTRITION ORDERS  Diet: Regular  Intake/Tolerance: 80% of breakfast (1/16)    LABS  Labs reviewed    MEDICATIONS  Medications reviewed    ANTHROPOMETRICS  Height: 6'3\" (per chart review)   Most Recent Weight: 169.9lb (per RN)   IBW: 89 kg  BMI: 21 normal      Weight History:   Wt Readings from Last 10 Encounters:   11/29/19 81.6 kg (180 lb) (83 %, Z= 0.95)*   03/13/19 81.6 kg (180 lb) (85 %, Z= 1.04)*     * Growth percentiles are based on CDC (Boys, 2-20 Years) data.       Dosing Weight: 77 kg CBW    ASSESSED NUTRITION NEEDS  Estimated Energy Needs: 5506-6359 kcals/day (25 - 30 kcals/kg)  Justification: Maintenance  Estimated Protein Needs: 77-92 grams protein/day (1 - 1.2 grams of pro/kg)  Justification: Maintenance  Estimated Fluid Needs: 1 mL/kcal   Justification: Maintenance    PHYSICAL FINDINGS  Unable " to determine due to RD off-site and unable to conduct NFPA    MALNUTRITION  % Intake: Unable to assess  % Weight Loss: Unable to assess not sufficient wt hx to assess   Subcutaneous Fat Loss: Unable to assess  Muscle Loss: Unable to assess  Fluid Accumulation/Edema: Unable to assess  Malnutrition Diagnosis: Patient does not meet two of the established criteria necessary for diagnosing malnutrition    NUTRITION DIAGNOSIS  Predicted inadequate nutrient intake related to depression and SI as evidenced by pt report of decreased appetite and po intake PTA      INTERVENTIONS  Implementation  Modify composition of meals/snacks: will send 2 pm snack of gold fish crackers, yogurt and sting cheese     Goals  Patient to consume % of nutritionally adequate meal trays TID, or the equivalent with supplements/snacks.     Monitoring/Evaluation  Progress toward goals will be monitored and evaluated per protocol.      Domitila Yee RD, LD   Clinical Dietitian   On Call Weekend Pager: [phone]

## 2021-01-17 PROCEDURE — 250N000013 HC RX MED GY IP 250 OP 250 PS 637: Performed by: CLINICAL NURSE SPECIALIST

## 2021-01-17 PROCEDURE — 250N000012 HC RX MED GY IP 250 OP 636 PS 637: Performed by: CLINICAL NURSE SPECIALIST

## 2021-01-17 PROCEDURE — 124N000002 HC R&B MH UMMC

## 2021-01-17 RX ADMIN — AZATHIOPRINE 100 MG: 50 TABLET ORAL at 09:04

## 2021-01-17 RX ADMIN — Medication 1 CAPSULE: at 09:05

## 2021-01-17 RX ADMIN — OLANZAPINE 10 MG: 10 TABLET, ORALLY DISINTEGRATING ORAL at 21:28

## 2021-01-17 RX ADMIN — LEVOMEFOLATE CALCIUM 7.5 MG TABLET 7.5 MG: TABLET at 09:04

## 2021-01-17 RX ADMIN — Medication 10 MG: at 21:28

## 2021-01-17 RX ADMIN — Medication 1 CAPSULE: at 21:28

## 2021-01-17 ASSESSMENT — ACTIVITIES OF DAILY LIVING (ADL)
LAUNDRY: WITH SUPERVISION
DRESS: INDEPENDENT
ORAL_HYGIENE: INDEPENDENT
HYGIENE/GROOMING: INDEPENDENT
ORAL_HYGIENE: INDEPENDENT
HYGIENE/GROOMING: INDEPENDENT
DRESS: INDEPENDENT
LAUNDRY: WITH SUPERVISION

## 2021-01-17 NOTE — PROGRESS NOTES
Writer called pt's mother at 1050 to bring in the home supply of Humira IM. Mother asked if she could take pt's home keys and phone from his belongings. Writer relayed the information to pt. Pt.gave a verbal consent at 1100 to give his mother his keys and cellphone to take home.     Pt's mom brought the Humira IM at 1300. Medication reviewed and labelled by inpatient pharmacy. Scheduled to be administered the next day,Monday at 0800. Medication kept in the refrigerator.

## 2021-01-17 NOTE — PLAN OF CARE
"Herminio participated in Music Therapy group. Intervention offered addressed emotional, psychosocial and communicative goals of non-verbal expression, spontaneous communication/interactions, and to enhance quality of life and wellness.  Herminio was calm and cooperative throughout group, but tended to \"fade into the background\" as far as participation as group progressed.  He appeared a little unsure of himself.  He did express a wide array of favorite musicians at group start.  More assessment needed.  He checked out of group stating he felt \"low-but I know what I need to do-go to college\", which presented as somewhat non sequitur to the question being presented about evening goals.       "

## 2021-01-17 NOTE — PLAN OF CARE
Pt.appears to be improving in his presentation as evidenced by he is out in the milieu most of the shift; maintains eye contact with staff while talking; more coherent and able to answer questions. However, he is still isolative and withdrawn to self. No interaction with peers. Appears to be responding to internal stimuli. Affect blunted but slightly improved. Good appetite. Endorsed anxiety. Denied SI/SIB. Denied any medication adverse side effects and none observed. Did not require prn medication or seclusion/restraint to manage behavior. Will continue to monitor.

## 2021-01-18 PROCEDURE — 99232 SBSQ HOSP IP/OBS MODERATE 35: CPT | Mod: 95 | Performed by: CLINICAL NURSE SPECIALIST

## 2021-01-18 PROCEDURE — 124N000002 HC R&B MH UMMC

## 2021-01-18 PROCEDURE — 250N000013 HC RX MED GY IP 250 OP 250 PS 637: Performed by: CLINICAL NURSE SPECIALIST

## 2021-01-18 PROCEDURE — 99207 PR CDG-CHARGE REQUIRED MANUAL ENTRY: CPT | Performed by: CLINICAL NURSE SPECIALIST

## 2021-01-18 PROCEDURE — G0177 OPPS/PHP; TRAIN & EDUC SERV: HCPCS

## 2021-01-18 PROCEDURE — 250N000012 HC RX MED GY IP 250 OP 636 PS 637: Performed by: CLINICAL NURSE SPECIALIST

## 2021-01-18 RX ADMIN — Medication 10 MG: at 22:17

## 2021-01-18 RX ADMIN — Medication 1 CAPSULE: at 10:26

## 2021-01-18 RX ADMIN — OLANZAPINE 10 MG: 10 TABLET, ORALLY DISINTEGRATING ORAL at 22:17

## 2021-01-18 RX ADMIN — Medication 1 CAPSULE: at 22:17

## 2021-01-18 RX ADMIN — LEVOMEFOLATE CALCIUM 7.5 MG TABLET 7.5 MG: TABLET at 10:25

## 2021-01-18 RX ADMIN — AZATHIOPRINE 100 MG: 50 TABLET ORAL at 10:26

## 2021-01-18 ASSESSMENT — ACTIVITIES OF DAILY LIVING (ADL)
LAUNDRY: WITH SUPERVISION
DRESS: INDEPENDENT
ORAL_HYGIENE: INDEPENDENT
HYGIENE/GROOMING: INDEPENDENT
ORAL_HYGIENE: INDEPENDENT
HYGIENE/GROOMING: INDEPENDENT
DRESS: INDEPENDENT

## 2021-01-18 NOTE — PROGRESS NOTES
Pt.stated he had a good day. He was out and visible in the milieu. Attended and participated in milieu group therapies. Got his weekly Humira shot. Denied SI/SIB/hallucinations. Affect blunted. Will continue to monitor.

## 2021-01-18 NOTE — PLAN OF CARE
Pt attended the structured Therapeutic Recreation group, participating in a group activity. Pt participated in group discussion, leisure participation, and social engagement to gain self-esteem, manage behaviors, improve social skills, decrease isolation, and reduce anxiety/depression.   Pt remained focused and engaged throughout group activity.  Pt mood was sociable and was appropriate with interactions, contributing to the clues and descriptions throughout the activity. Pt was a full participant for the duration of the group.

## 2021-01-18 NOTE — PLAN OF CARE
"  Problem: Psychotic Symptoms  Goal: Psychotic Symptoms  Description: Signs and symptoms of listed problems will be absent or manageable.  Outcome: Improving  Flowsheets (Taken 1/18/2021 0215)  Psychotic Symptoms Assessed:   anxiety   insight  Psychotic Symptoms Present:   anxiety   insight   Pt was sociable on approach and held a coherent conversation with the writer. Pt did request to know what medication he is currently taking and writer went through all his medications with him. Pt was appreciative and said he would contact parents to discuss some of the medications on his medication list. When writer asked pt about his anxiety, he was hesitant to speak then after a while he said he did not want to say how he really felt because that would hold him back and make his discharge impossible. Writer explained that when care team learn about how the patient was feeling then it would be easier to tailor his treatment here and when he discharge. Pt was agreeable to speaking out on how he was feeling, rated his anxiety at 7/10 but declined to take any medication, \"I can wait.\" Pt denies SI/SIB/HI/AH/VH at this time. Continue to monitor.  "

## 2021-01-18 NOTE — PROGRESS NOTES
[facility], [place]   Psychiatric Progress Note        Interim History:   The patient's care was discussed with the treatment team during the daily team meeting and/or staff's chart notes were reviewed.  Staff report patient is visible in on the unit and attends groups. Group facilitator is reporting patient is engaged and focused in groups.     Psychiatric symptoms and interventions:   Upon interview with patient, that his thinking is slowed and it is difficult to focus. Patient presented with more affect, thought blocking was decreased, and he was able to participate in the conversation but was guarded. Patient told his mother that he is scratching himself. Patient presents with a flat affect. He is guarded with his answers.     Discussed with medication that he will work on the [facility] and [employer] . I have ordered psychological testing      Continue with Zyprexa 10 mg to address agitation and thought blocking.          Medications:       adalimumab  40 mg Subcutaneous Q7 Days     azaTHIOprine  100 mg Oral Daily     lactobacillus rhamnosus (GG)  1 capsule Oral BID     melatonin  10 mg Oral At Bedtime     methylfolate  7.5 mg Oral Daily     OLANZapine zydis  10 mg Oral At Bedtime          Allergies:     Allergies   Allergen Reactions     Nuts      All nuts           Labs:   No results found for this or any previous visit (from the past 24 hour(s)).       Psychiatric Examination:     /66   Pulse 109   Temp 97.6  F (36.4  C) (Oral)   Resp 16   Wt 74.7 kg (164 lb 9.6 oz)   SpO2 100%   BMI 20.57 kg/m    Weight is 164 lbs 9.6 oz  Body mass index is 20.57 kg/m .  Orthostatic Vitals       Most Recent      Sitting Orthostatic /66 01/18 0909    Sitting Orthostatic Pulse (bpm) 109 01/18 0909    Standing Orthostatic /53 01/18 0909    Standing Orthostatic Pulse (bpm) 110 01/18 0909            Appearance: awake, alert and adequately groomed  Attitude:  evasive and guarded  Eye  Contact:  fair  Mood:  anxious and depressed  Affect:  intensity is flat  Speech:  paucity of speech  Psychomotor Behavior:  no evidence of tardive dyskinesia, dystonia, or tics  Throught Process:  goal oriented  Associations:  no loose associations  Thought Content:  passive suicidal ideation present  Insight:  limited  Judgement:  limited  Oriented to:  time, person, and place  Attention Span and Concentration:  fair  Recent and Remote Memory:  intact    Clinical Global Impressions  First:     Most recent:            Precautions:     Behavioral Orders   Procedures     Code 1 - Restrict to Unit     Routine Programming     As clinically indicated     Single Room     Psychotic and parnaoid     Status 15     Every 15 minutes.          DIagnoses:   1. Unspecified psychosis    2.  .Major depressive disorder, recurrent, severe with psychosis.   3.  Cannabis use disorder, severe.   4.  Nicotine dependence.   5.  Crohn's disease.          Plan:   Legal status: Voluntary     Medication management:   Zyprexa 10 mg     Medical: The patient has a history of Crohn's disease. Reviewed admission labs: unremarkable, COVID screen is negative.    Behavioral/psychological/social:  Encouraged patient to attend therapeutic  hospital programming as tolerated.  Psychological testing, MMPI, [place]    Disposition plan:   Reason for continued hospitalization : Patient is at risk for harming self.   Estimated length of stay is 3-5 days   Return to home with IOP day treatment vs. PHP    Video-Visit Details     Type of service:  Video Visit     Video Start Time (time video started): 11:40 am      Video End Time (time video stopped): 11:52 am    Originating Location (pt. Location): Other Station 4A     Distant Location (provider location): Provider remote location     Mode of Communication:  Video Conference via [employer]     Physician has received verbal consent for a Video Visit from the patient? Yes

## 2021-01-18 NOTE — PLAN OF CARE
"INITIAL OT NOTE  Problem: OT General Care Plan  Goal: OT Goal 1  Description: Within 1 week, Pt will demonstrate increased openness as evidenced by sharing >2 thoughts and/or feelings on mental health or substance use.   Outcome: Improving    Pt actively participated in occupational therapy clinic. Pt was able to ask for assistance as needed, and independently initiate self-selected task. Pt demonstrated good focus and problem solving. Pt appeared guarded for the majority of group time, responding verbally only when prompted. Towards the end of group, pt approaches writer indirectly, whispering about the environment being \"triggering\", referring to it as \"this  program\". 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 Pt appears overwhelmed as he talked about doing projects in OT clinic reminding him of childhood, doing the personality inventory (MMPI), taking a \"cocktail of meds\", discharge plans and the confusion he is experiencing. Writer inquired if pt understood why he's being hospitalized. Pt shared it was because he was \"suicidal\". Pt does share and agree that his thinking is not as clear as it was and that needs to be addressed before he can leave. Pt terminates conversation when lunch arrives, but writer offers continued time to talk and process anytime he would like. Pt agreed and thanked writer.     Pt actively participated in a mental health management group with a topic of needs exploration. The purpose of this exercise is to visualize and identify what it means to have one's physical, emotional, and spiritual needs met and how one can start to work on attaining those things.  Pt engaged in a discussion about identifying current needs, barriers, and ways to meet unmet needs. Pt shared a personal need that is significant to him right now: \"obtaining my education and doing it the right " "way\". Pt shared his perspective that his \"current path may look different than what I thought it should look like\". Pt shared his willingness to \"let go of the past to make space and opportunity to grow\". As patient talked, these statements appeared difficult for him to say, but pt was accepting of encouragement. Pt did appear more engaged this PM - less guarded and more interactive in the conversation.     "

## 2021-01-18 NOTE — PROGRESS NOTES
NOC Shift Report    Pt in bed at beginning of shift, breathing quiet and unlabored. Pt slept through shift. Pt slept 6.75 hours.     No pt complaints or concerns at this time.     No PRNs given. Will continue to monitor.

## 2021-01-19 PROCEDURE — G0177 OPPS/PHP; TRAIN & EDUC SERV: HCPCS

## 2021-01-19 PROCEDURE — 124N000002 HC R&B MH UMMC

## 2021-01-19 PROCEDURE — 99232 SBSQ HOSP IP/OBS MODERATE 35: CPT | Mod: 95 | Performed by: CLINICAL NURSE SPECIALIST

## 2021-01-19 PROCEDURE — 250N000012 HC RX MED GY IP 250 OP 636 PS 637: Performed by: CLINICAL NURSE SPECIALIST

## 2021-01-19 PROCEDURE — 250N000013 HC RX MED GY IP 250 OP 250 PS 637: Performed by: CLINICAL NURSE SPECIALIST

## 2021-01-19 PROCEDURE — H2032 ACTIVITY THERAPY, PER 15 MIN: HCPCS

## 2021-01-19 RX ADMIN — LEVOMEFOLATE CALCIUM 7.5 MG TABLET 7.5 MG: TABLET at 09:04

## 2021-01-19 RX ADMIN — Medication 1 CAPSULE: at 09:03

## 2021-01-19 RX ADMIN — OLANZAPINE 10 MG: 10 TABLET, ORALLY DISINTEGRATING ORAL at 22:26

## 2021-01-19 RX ADMIN — AZATHIOPRINE 100 MG: 50 TABLET ORAL at 09:04

## 2021-01-19 RX ADMIN — Medication 1 CAPSULE: at 22:26

## 2021-01-19 RX ADMIN — Medication 10 MG: at 22:29

## 2021-01-19 ASSESSMENT — ACTIVITIES OF DAILY LIVING (ADL)
DRESS: INDEPENDENT
HYGIENE/GROOMING: INDEPENDENT
ORAL_HYGIENE: INDEPENDENT

## 2021-01-19 NOTE — PROGRESS NOTES
"[facility], [place]   Psychiatric Progress Note        Interim History:   The patient's care was discussed with the treatment team during the daily team meeting and/or staff's chart notes were reviewed.  Staff report patient worked on [employer] and [employer]. He is reporting that he has a hard time focusing.     Psychiatric symptoms and interventions:   Upon interview with patient, who reports his mood is very low and he is very anxious. Patient continues to be very guarded with his answers. Patient reports having \"lots of trouble\" answering questions on MMPI. Patient is concerned that he may answer the question the wrong way or may give the wrong impression. Patient appeared distracted. Responses were somewhat latent. He presents with blunted affect. Patient talked about TV in the ER that was giving him messages. He is not experiencing messages from the [place] on the unit.     Patient met with Dr. Mike for dx clarification.           Medications:       adalimumab  40 mg Subcutaneous Q7 Days     azaTHIOprine  100 mg Oral Daily     lactobacillus rhamnosus (GG)  1 capsule Oral BID     melatonin  10 mg Oral At Bedtime     methylfolate  7.5 mg Oral Daily     OLANZapine zydis  10 mg Oral At Bedtime          Allergies:     Allergies   Allergen Reactions     Nuts      All nuts           Labs:   No results found for this or any previous visit (from the past 24 hour(s)).       Psychiatric Examination:     /83 (BP Location: Right arm)   Pulse 73   Temp 97.9  F (36.6  C) (Tympanic)   Resp 16   Wt 74.7 kg (164 lb 9.6 oz)   SpO2 98%   BMI 20.57 kg/m    Weight is 164 lbs 9.6 oz  Body mass index is 20.57 kg/m .  Orthostatic Vitals       Most Recent      Sitting Orthostatic /66 01/18 0909    Sitting Orthostatic Pulse (bpm) 109 01/18 0909    Standing Orthostatic /53 01/18 0909    Standing Orthostatic Pulse (bpm) 110 01/18 0909             Appearance: awake, alert and adequately " groomed  Attitude:  evasive and guarded  Eye Contact:  fair  Mood:  anxious and depressed  Affect:  intensity is flat  Speech:  paucity of speech  Psychomotor Behavior:  no evidence of tardive dyskinesia, dystonia, or tics  Throught Process:  goal oriented  Associations:  no loose associations  Thought Content:  passive suicidal ideation present  Insight:  limited  Judgement:  limited  Oriented to:  time, person, and place  Attention Span and Concentration:  fair  Recent and Remote Memory:  intact     Clinical Global Impressions  First:     Most recent:            Precautions:     Behavioral Orders   Procedures     Code 1 - Restrict to Unit     [employer]     MMPI     Routine Programming     As clinically indicated     Single Room     Psychotic and parnaoid     Status 15     Every 15 minutes.          DIagnoses:   1. Unspecified psychosis    2.  .Major depressive disorder, recurrent, severe with psychosis.   3.  Cannabis use disorder, severe.   4.  Nicotine dependence.   5.  Crohn's disease.          Plan:   Legal status: Voluntary      Medication management:   Zyprexa 10 mg      Medical: The patient has a history of Crohn's disease. Reviewed admission labs: unremarkable, COVID screen is negative.     Behavioral/psychological/social:  Encouraged patient to attend therapeutic  hospital programming as tolerated.  Psychological testing, MMPI, [place]     Disposition plan:   Reason for continued hospitalization : Patient is at risk for harming self.   Estimated length of stay is 3-5 days   Return to home with IOP day treatment vs. PHP     Video-Visit Details     Type of service:  Video Visit     Video Start Time (time video started): 12:20 pm     Video End Time (time video stopped): 12:30 pm    Originating Location (pt. Location): Other Station 4A     Distant Location (provider location): Provider remote location     Mode of Communication:  Video Conference via [employer]     Physician has received verbal consent for a Video  Visit from the patient? Yes

## 2021-01-19 NOTE — PLAN OF CARE
"  Problem: Depressive Symptoms  Goal: Depressive Symptoms  Description: Signs and symptoms of listed problems will be absent or manageable.  Outcome: No Change  Goal: Social and Therapeutic (Depression)  Description: Signs and symptoms of listed problems will be absent or manageable.  Outcome: No Change     Problem: Psychotic Symptoms  Goal: Psychotic Symptoms  Description: Signs and symptoms of listed problems will be absent or manageable.  Outcome: Improving    Pt has been isolative to self but visible on the unit.  He has spent the majority of the evening in the kitchen working on his MMPI/MCMI.  Stated he was having a hard time doing it.   Rates depression at 9/10.  Endorses anxiety but \"its not that bad\"  No suicidal thoughts.  Told this writer that he was not talking when he came into the ER because very strange things were going on (the TV was on but it was not real) and he was suspicious.  He is still suspicious but not as much on the unit.  He was observed to make a phone call tonight.  Good eye contact.  Engaged in the conversation.  Really wanted to know how long he was going to be here.  "

## 2021-01-19 NOTE — PLAN OF CARE
NURSING ASSESSMENT    Patient continue to be visible in the milieu thought isolative to self. Minimal interaction with peers observed. Patient still appears paranoid. Patient is pretty sluggish in responding to check-in questions.   Patient is endorsing depression and rates as 7/10, says anxiety is better. Patient denies SI/SIB, AH/VH.   Patient expresses worries about when he is going to discharge.   Patient remains cooperative and medication compliant at this time.     APPETITE: Good  SLEEP: Adequate.   PRNs: None

## 2021-01-19 NOTE — PLAN OF CARE
Work Completed: Team meeting, Chart review    Discharge plan or goal: medication management and therapy                 Barriers to discharge: stabilization

## 2021-01-19 NOTE — PROGRESS NOTES
"Behavioral Health  Note    Behavioral Health  Spirituality Group Note    UNIT 4A [place]    Name: Julian Berg YOB: 2000   MRN: [id number] Age: 20 year old      Patient attended -led group, which included discussion of spirituality, coping with illness and building resilience.    Patient attended group for 1.0 hrs.    The patient actively participated in group discussion and patient demonstrated an appreciation of topic's application for their personal circumstances.  Herminio respectfully participated in our exploration around gratitude. He expressed gratitude for the \"support of his parents.\" He acknowledged \"past mistakes\" and was also able to identify times in his life others have been grateful for him--namely, \"helping roommates with school.\"      Katharine Dominguez  Chaplain Resident  Pager: [phone]   "

## 2021-01-19 NOTE — PLAN OF CARE
"Problem: OT General Care Plan  Goal: OT Goal 1  Description: Within 1 week, Pt will demonstrate increased openness as evidenced by sharing >2 thoughts and/or feelings on mental health or substance use.   Outcome: Improving    Pt attended and participated in a structured occupational therapy group session where intervention focused on sensory system education, strategies to modulate arousal level, and coping through the senses for improved functioning in daily routines and other meaningful tasks. Pt worked to complete sensory diet checklist and engaged in discussion surrounding sensory processing. Examples of the different sensory tools pt indicated they like to use include: \"listening to music, running/jogging, and massage\". Pt demonstrated active listening and shared that \"candles\" is a source of sensory integration that they would like to use as a coping skill. Pt did quickly retract this, stating he can't think of a sensory strategy he hasn't used. Writer encouraged him to think of activities he uses currently and brainstorm ways he may use that to relax or alert his body as a way to be mindful of what sensory input we take in our bodies. Pt nodded, appearing receptive to this idea.      "

## 2021-01-20 PROCEDURE — G0177 OPPS/PHP; TRAIN & EDUC SERV: HCPCS

## 2021-01-20 PROCEDURE — 99232 SBSQ HOSP IP/OBS MODERATE 35: CPT | Mod: 95 | Performed by: CLINICAL NURSE SPECIALIST

## 2021-01-20 PROCEDURE — 124N000002 HC R&B MH UMMC

## 2021-01-20 PROCEDURE — 250N000013 HC RX MED GY IP 250 OP 250 PS 637: Performed by: CLINICAL NURSE SPECIALIST

## 2021-01-20 PROCEDURE — 250N000012 HC RX MED GY IP 250 OP 636 PS 637: Performed by: CLINICAL NURSE SPECIALIST

## 2021-01-20 PROCEDURE — H2032 ACTIVITY THERAPY, PER 15 MIN: HCPCS

## 2021-01-20 RX ADMIN — Medication 1 CAPSULE: at 21:34

## 2021-01-20 RX ADMIN — AZATHIOPRINE 100 MG: 50 TABLET ORAL at 08:38

## 2021-01-20 RX ADMIN — Medication 1 CAPSULE: at 08:38

## 2021-01-20 RX ADMIN — Medication 10 MG: at 21:34

## 2021-01-20 RX ADMIN — LEVOMEFOLATE CALCIUM 7.5 MG TABLET 7.5 MG: TABLET at 08:38

## 2021-01-20 RX ADMIN — OLANZAPINE 10 MG: 10 TABLET, ORALLY DISINTEGRATING ORAL at 21:34

## 2021-01-20 NOTE — PLAN OF CARE
Pt in milieu for most of evening.  He has kept mostly to himself, no effort to socialize.  He has been eating well and drinking adequate fluids.  He has watched TV for short periods of time.  Pt's requests and conversation have been appropriate, thoughts follow.  His affect is flat, consistent with symptoms of depression.  He is new to this writer, so his emotions are guarded.  He denies SI, SIB.

## 2021-01-20 NOTE — PLAN OF CARE
"Problem: OT General Care Plan  Goal: OT Goal 1  Description: Within 1 week, Pt will demonstrate increased openness as evidenced by sharing >2 thoughts and/or feelings on mental health or substance use.   Outcome: Improving    Pt actively participated in a structured occupational therapy group focused on developing insight into supports, needs, and life struggles via a calming guided watercolor and safe containment. Pt was pulled out of group to meet with provider at the end of group so pt did not have the chance to verbally share his work, but per observation, pt demonstrated understanding of the activity by illustrating a river and including a bridge combining the [place] and [place] of [place]. This tends to be a common theme in his art and his verbal content. Pt is very focused on \"not being a [employer] student anymore\", \"regrets and mistakes that were made\", \"prioritizing the wrong things\", and \"fraudulent activity\". Pt does not explicitly explain or elaborate on these topics, but more so brings them up during conversations with writer. Pt needs frequent reassurance that there is hope and potential for his future. Writer explains that resilience doesn't always look like \"pushing through\", but may look like \"recharging and reflecting\" so one can function optimally for themselves and others. Unclear at this time if pt is able to believe this for himself.     Pt actively participated in occupational therapy clinic. Pt continues to select brain-stimulating tasks during this group such as crossword puzzles. Pt demonstrated fair focus. During group, pts back was facing the door. Periodically pt checks behind him, appearing very observant of noise and peers in the space. At one point a peer from outside the room loudly throws trash in the garbage of the group room which appeared to startle Herminio. Writer checked in with pt and he said, \"it's fine, I just feel like that was really disrespectful of him to come in and throw his " "trash like that\".    Pt actively participated in a mental health management group with a focus on coping through movement to facilitate relaxation, stress management, and body awareness via yoga bingo. Pt followed and engaged in 100% of the guided yoga poses and demonstrated understanding of the bingo-component of group.   "

## 2021-01-20 NOTE — PLAN OF CARE
Work Completed: Team meeting chart review, writer checking in with pt who was confused regarding how to answer the questions on the MMPI and wants to start over. Pt was provided with a new bubble sheet. Writer explained to answer the questions to the best of his ability and if he has any questions to go over his answers with Dr. OTERO.      Discharge plan or goal: Navigate program.                 Barriers to discharge: stabilization

## 2021-01-20 NOTE — PROGRESS NOTES
01/20/21 0600   Sleep/Rest/Relaxation   Sleep/Rest/Relaxation (WDL) WDL   Sleep/Rest/Relaxation appears asleep   Night Time # Hours 6.75 hours   Focus: Shift summary    Data: Patient slept 6.75 hours last night. No interventions needed. Respirations even and unlabored on status 15 checks. Will continue to monitor and report to oncoming staff.    Response: Report sleep hours to day shift. Continue to monitor patient and provide therapeutic interventions as necessary.

## 2021-01-20 NOTE — PROGRESS NOTES
"[facility], [place]   Psychiatric Progress Note        Interim History:   The patient's care was discussed with the treatment team during the daily team meeting and/or staff's chart notes were reviewed.  Staff report patient is visible in the milieu and attends groups. Patient reports he doesn't like the groups because\" I graduated from high school and went to college, they do elementary things in groups.\"    Psychiatric symptoms and interventions:   Upon interview with patient, stated \"I'm struggling\".  Patient states he has not been able to finish the MMPI. Patient reports he doesn't know how to answer the questions. He requested another MMPI so he could start over. He is not sure that he answered the questions \"correctly\". Explained to patient there is no right or wrong answer. Patient reports he is anxious and depressed. He repeats over and over , \"This is not a good environment for me.\" Patient is reporting poor sleep. He denies any paranoia. Patient is having difficulty answering basic questions. Often he appears confused. He give minimal answers and often stops and starts his answers. He does not finish his sentences.          Medications:       adalimumab  40 mg Subcutaneous Q7 Days     azaTHIOprine  100 mg Oral Daily     lactobacillus rhamnosus (GG)  1 capsule Oral BID     melatonin  10 mg Oral At Bedtime     methylfolate  7.5 mg Oral Daily     OLANZapine zydis  10 mg Oral At Bedtime          Allergies:     Allergies   Allergen Reactions     Nuts      All nuts           Labs:   No results found for this or any previous visit (from the past 24 hour(s)).       Psychiatric Examination:     BP (!) 142/87 (BP Location: Right arm)   Pulse 89   Temp 97.5  F (36.4  C) (Oral)   Resp 16   Wt 74.7 kg (164 lb 9.6 oz)   SpO2 97%   BMI 20.57 kg/m    Weight is 164 lbs 9.6 oz  Body mass index is 20.57 kg/m .  Orthostatic Vitals       Most Recent      Sitting Orthostatic /86 01/19 " 1730    Sitting Orthostatic Pulse (bpm) 79 01/19 1730    Standing Orthostatic /86 01/20 0956    Standing Orthostatic Pulse (bpm) 101 01/20 0956          Appearance: awake, alert and adequately groomed  Attitude:  evasive and guarded  Eye Contact:  fair  Mood:  anxious and depressed  Affect:  intensity is flat  Speech:  paucity of speech  Psychomotor Behavior:  no evidence of tardive dyskinesia, dystonia, or tics  Throught Process:  goal oriented  Associations:   loosing of  associations  Thought Content:  passive suicidal ideation present  Insight:  limited  Judgement:  limited  Oriented to:  time, person, and place  Attention Span and Concentration:  fair  Recent and Remote Memory:  intact   Clinical Global Impressions  First:     Most recent:            Precautions:     Behavioral Orders   Procedures     Code 1 - Restrict to Unit     [employer]     MMPI     Routine Programming     As clinically indicated     Single Room     Psychotic and parnaoid     Status 15     Every 15 minutes.          DIagnoses:   1. Unspecified psychosis    2.  Rule out substance induced psychosis  3.  Major depressive disorder, recurrent, severe with psychosis.   4.  Cannabis use disorder, severe.   5.  Nicotine dependence.   6.  Crohn's disease.          Plan:   Legal status: Voluntary      Medication management:   Zyprexa 10 mg      Medical: The patient has a history of Crohn's disease. Reviewed admission labs: unremarkable, COVID screen is negative.     Behavioral/psychological/social:  Encouraged patient to attend therapeutic  hospital programming as tolerated.  Psychological testing, MMPI, Millon     Disposition plan:   Reason for continued hospitalization : Patient is at risk for harming self.   Estimated length of stay is 3-5 days   Return to home with First Episode Program     Video-Visit Details     Type of service:  Video Visit     Video Start Time (time video started): 11:09 am      Video End Time (time video stopped): 11:18  am    Originating Location (pt. Location): Other [facility]     Distant Location (provider location): Provider remote location     Mode of Communication:  Video Conference via [employer]     Physician has received verbal consent for a Video Visit from the patient? Yes

## 2021-01-20 NOTE — PLAN OF CARE
"Herminio participated in Music Therapy group this evening.  Participated in musical game \"Heads Up\" designed to create opportunities for patients to take turns, sustain attention and elevate mood.  Music Therapist also offered an original song for the group about Self-Acceptance and being \"in process\".  The goal of this song sharing was to open the door for thinking about ways one can accept oneself more on an ongoing basis. Herminio was receptive to all group processes and presented with a calm, cooperative affect.         "

## 2021-01-20 NOTE — PLAN OF CARE
Nursing assessment:  Pt evaluation continues. Assessed mood, anxiety, thoughts, and behavior. Is progressing towards goals. We discussed his suppressed talk last week and he stated there was so much on his mind that he couldn't talk about it then. He is up early, takes meds and eats breakfast. Encourage participation in groups and developing healthy coping skills. Pt denies auditory or visual  hallucinations. Refer to daily team meeting notes for individualized plan of care. Will continue to assess.

## 2021-01-21 PROCEDURE — G0177 OPPS/PHP; TRAIN & EDUC SERV: HCPCS

## 2021-01-21 PROCEDURE — H2032 ACTIVITY THERAPY, PER 15 MIN: HCPCS

## 2021-01-21 PROCEDURE — 124N000002 HC R&B MH UMMC

## 2021-01-21 PROCEDURE — 250N000012 HC RX MED GY IP 250 OP 636 PS 637: Performed by: CLINICAL NURSE SPECIALIST

## 2021-01-21 PROCEDURE — 250N000013 HC RX MED GY IP 250 OP 250 PS 637: Performed by: CLINICAL NURSE SPECIALIST

## 2021-01-21 PROCEDURE — 99232 SBSQ HOSP IP/OBS MODERATE 35: CPT | Mod: 95 | Performed by: CLINICAL NURSE SPECIALIST

## 2021-01-21 RX ADMIN — Medication 1 CAPSULE: at 08:34

## 2021-01-21 RX ADMIN — AZATHIOPRINE: 50 TABLET ORAL at 08:34

## 2021-01-21 RX ADMIN — OLANZAPINE 10 MG: 10 TABLET, ORALLY DISINTEGRATING ORAL at 22:06

## 2021-01-21 RX ADMIN — Medication 1 CAPSULE: at 22:06

## 2021-01-21 RX ADMIN — QUETIAPINE FUMARATE 100 MG: 100 TABLET ORAL at 22:06

## 2021-01-21 RX ADMIN — Medication 10 MG: at 22:06

## 2021-01-21 RX ADMIN — LEVOMEFOLATE CALCIUM 7.5 MG TABLET 7.5 MG: TABLET at 08:34

## 2021-01-21 NOTE — PLAN OF CARE
Problem: Depressive Symptoms  Goal: Depressive Symptoms  Description: Signs and symptoms of listed problems will be absent or manageable.  Outcome: Improving  Problem: Psychotic Symptoms  Goal: Psychotic Symptoms  Description: Signs and symptoms of listed problems will be absent or manageable.  Outcome: No Change  Pt was able to finish his millon and MMPI. He presents as anxious sometimes and continues to have a difficult time discerning things that are beneficial to him and things that are harmful. Insight remains poor. Pt can be redirectable.  Pt's mother called and this RN spent 20 minutes discussing his behaviors and the MMPI & millon and the reasons for. She has many questions.

## 2021-01-21 NOTE — PLAN OF CARE
Work Completed: Team meeting & Chart review    Discharge plan or goal: Navigate program                Barriers to discharge: Stabilization

## 2021-01-21 NOTE — PLAN OF CARE
Pt participated in Therapeutic Recreation group with focus on leisure participation, communication skills, and critical thinking. Pt was hesitant to participate at first, but after everyone else took a turn, he decided to as well.He remained focused and engaged in the group recreational activity throughout the duration of group.

## 2021-01-21 NOTE — PROGRESS NOTES
[facility], [place]   Psychiatric Progress Note        Interim History:   The patient's care was discussed with the treatment team during the daily team meeting and/or staff's chart notes were reviewed.  Staff report patient is visible in the milieu and attends groups . Patient reports he is struggling.     Psychiatric symptoms and interventions:   Upon interview with patient, is displaying thought blocking and reports he is struggling in groups. He recognizes that he has trouble expressing his thoughts. He sates he has trouble putting his thoughts together to express what he is thinking. Patient has not been able to complete MMPI.     Zyprexa has not improved symptoms. Discussed with mother a trial of Abilify. Mother is in agreement. Discussed patient would benefit form the Navigate program.          Medications:       adalimumab  40 mg Subcutaneous Q7 Days     azaTHIOprine  100 mg Oral Daily     lactobacillus rhamnosus (GG)  1 capsule Oral BID     melatonin  10 mg Oral At Bedtime     methylfolate  7.5 mg Oral Daily     OLANZapine zydis  10 mg Oral At Bedtime          Allergies:     Allergies   Allergen Reactions     Nuts      All nuts           Labs:   No results found for this or any previous visit (from the past 24 hour(s)).       Psychiatric Examination:     BP (!) 142/86   Pulse 74   Temp 97.6  F (36.4  C) (Oral)   Resp 16   Wt 74.4 kg (164 lb)   SpO2 99%   BMI 20.50 kg/m    Weight is 164 lbs 0 oz  Body mass index is 20.5 kg/m .  Orthostatic Vitals       Most Recent      Sitting Orthostatic /86 01/21 0900    Sitting Orthostatic Pulse (bpm) 74 01/21 0900    Standing Orthostatic /84 01/21 0900    Standing Orthostatic Pulse (bpm) 82 01/21 0900        Appearance: awake, alert and adequately groomed  Attitude:  evasive and guarded  Eye Contact:  fair  Mood:  anxious and depressed  Affect:  intensity is flat  Speech:  paucity of speech, thought blocking  Psychomotor  Behavior:  no evidence of tardive dyskinesia, dystonia, or tics  Throught Process:  goal oriented  Associations:   loosing of  associations  Thought Content:  passive suicidal ideation present  Insight:  limited  Judgement:  limited  Oriented to:  time, person, and place  Attention Span and Concentration:  fair  Recent and Remote Memory:  intact  Clinical Global Impressions  First:     Most recent:            Precautions:     Behavioral Orders   Procedures     Code 1 - Restrict to Unit     [employer]     MMPI     Routine Programming     As clinically indicated     Single Room     Psychotic and parnaoid     Status 15     Every 15 minutes.          DIagnoses:   1. Unspecified psychosis    2.  Rule out substance induced psychosis  3.  Major depressive disorder, recurrent, severe with psychosis.   4.  Cannabis use disorder, severe.   5.  Nicotine dependence.   6.  Crohn's disease.          Plan:   Legal status: Voluntary      Medication management:   Zyprexa 10 mg      Medical: The patient has a history of Crohn's disease. Reviewed admission labs: unremarkable, COVID screen is negative.     Behavioral/psychological/social:  Encouraged patient to attend therapeutic  hospital programming as tolerated.  Psychological testing, MMPI, Millon     Disposition plan:   Reason for continued hospitalization : Patient is at risk for harming self.   Estimated length of stay is 3-5 days   Return to home with First Episode Program     Video-Visit Details     Type of service:  Video Visit     Video Start Time (time video started): 1105     Video End Time (time video stopped): 1123    Originating Location (pt. Location): Other [facility]     Distant Location (provider location): Provider remote location     Mode of Communication:  Video Conference via [employer]     Physician has received verbal consent for a Video Visit from the patient? Yes

## 2021-01-21 NOTE — PLAN OF CARE
"Problem: OT General Care Plan  Goal: OT Goal 1  Description: Within 1 week, Pt will demonstrate increased openness as evidenced by sharing >2 thoughts and/or feelings on mental health or substance use.   Outcome: No Change    Per provider note, pt reports he doesn't like groups because \"I graduated from high school and went to college, they do elementary things in groups,\" though pt attends every group there is offered. Writer continues to encourage pt to complete a self assessment form so writer can better cater to his needs and perhaps provide him with individual worksheets/readings to work on if groups do not feel helpful to him. Pt has not filled this out and appears overwhelmed and paranoid by all of the assessments. Writer does emphasize the importance of him completing the MMPI first and foremost.     Pt attended OT clinic group this AM. Pt is guarded and somewhat irritable. Pt self-selects familiar brain-stimulating worksheets. Writer offers a novel cognitive puzzle worksheet to provide variation for him and pt declines without explanation. Quietly engaged. Minimally social.    This afternoon, pt appears to be doing slightly better. More engaged and social with writer and peers - open to the discussion based group. Pt actively participated in a mental health management group with a topic of goal setting in relation to what we do and don't have control over. Pt consistently contributed ideas to the discussion about components of a good goal in different domains (physical, social, service, self-esteem, coping, and cognition) and what we do/don't have control over. Pt identified a self-esteem/compassion goal to \"try to stop comparing myself to others situations\". Pt was receptive to the collective group agreeing with this. Pt also stated other goals for him are to \"return to college and decrease my sugar intake.\" Pt appeared comfortable sharing goals and ideas with group members. Pt was insightful and engaged " throughout the full duration of group.         HPI: 86 year old Woman with pmhx of asthma, HTN, Polycythemia presented to ED c/o fall d/t RLE weakness. She reports that while trying to get out of bed this morning her R leg felt weak and when she attempted to stand her knee "gave way" then she fell to the ground on her knees, denies precipitating factors, no numbness/tingling. Ambulates with a cane at home. Denies CP/palpitation/SOB/HA/dizziness/abd pain/n/v/d/f/c.. Denies dysuria/urgency/frequency. Has had no hospitalization since her last hospital admit in [facility] 2018.    : right leg weakness much improved    : MRI showed acute lacunar infarct Left thalamus  BP was 173/99 in early am, later 147/53  HR went up to 120s upon minimal ambulation; ataxic gait right leg      PHYSICAL EXAM:    Vital Signs Last 24 Hrs  T(C): 36.7 (2019 04:24), Max: 37.2 (2019 21:26)  T(F): 98.1 (2019 04:24), Max: 98.9 (2019 21:26)  HR: 101 (2019 08:27) (76 - 101)  BP: 147/53 (2019 08:27) (129/60 - 173/99)  BP(mean): --  RR: 17 (2019 04:24) (17 - 18)  SpO2: 100% (2019 04:24) (92% - 100%))      Constitutional: Well appearing  HEENT: Atraumatic, MARISSA, Normal, No congestion  Respiratory: Breath Sounds normal, no rhonchi/wheeze  Cardiovascular: N S1S2; SYEDA present  Gastrointestinal: Abdomen soft, non tender, Bowel Sounds present  Extremities: No edema, peripheral pulses present  Neurological: AAO x 3, no gross focal motor deficits  Skin: Non cellulitic, no rash, ulcers  Lymph Nodes: No lymphadenopathy noted  Back: No CVA tenderness   Musculoskeletal: non tender  Breasts: Deferred  Genitourinary: deferred  Rectal: Deferred                          10.3   3.59  )-----------( 161      ( 2019 07:36 )             32.5       CBC Full  -  ( 2019 07:36 )  WBC Count : 3.59 K/uL  RBC Count : 3.45 M/uL  Hemoglobin : 10.3 g/dL  Hematocrit : 32.5 %  Platelet Count - Automated : 161 K/uL  Mean Cell Volume : 94.2 fl  Mean Cell Hemoglobin : 29.9 pg  Mean Cell Hemoglobin Concentration : 31.7 gm/dL  Auto Neutrophil # : 2.63 K/uL  Auto Lymphocyte # : 0.59 K/uL  Auto Monocyte # : 0.26 K/uL  Auto Eosinophil # : 0.07 K/uL  Auto Basophil # : 0.02 K/uL  Auto Neutrophil % : 73.3 %  Auto Lymphocyte % : 16.4 %  Auto Monocyte % : 7.2 %  Auto Eosinophil % : 1.9 %  Auto Basophil % : 0.6 %          139  |  107  |  27<H>  ----------------------------<  96  3.7   |  26  |  0.84    Ca    9.1      2019 07:36  Mg     2.1         TPro  7.6  /  Alb  3.4  /  TBili  0.7  /  DBili  x   /  AST  26  /  ALT  15  /  AlkPhos  80  06-07      LIVER FUNCTIONS - ( 2019 12:07 )  Alb: 3.4 g/dL / Pro: 7.6 gm/dL / ALK PHOS: 80 U/L / ALT: 15 U/L / AST: 26 U/L / GGT: x                 CARDIAC MARKERS ( 2019 15:34 )  0.025 ng/mL / x     / x     / x     / x      CARDIAC MARKERS ( 2019 12:07 )  <0.015 ng/mL / x     / x     / x     / x            Urinalysis Basic - ( 2019 12:07 )    Color: Yellow / Appearance: very cloudy / S.010 / pH: x  Gluc: x / Ketone: Negative  / Bili: Negative / Urobili: Negative mg/dL   Blood: x / Protein: 100 mg/dL / Nitrite: Negative   Leuk Esterase: Small / RBC: Negative /HPF / WBC 0-2   Sq Epi: x / Non Sq Epi: Occasional / Bacteria: Many            MEDICATIONS  (STANDING):  amLODIPine   Tablet 10 milliGRAM(s) Oral daily  aspirin 325 milliGRAM(s) Oral daily  atorvastatin 40 milliGRAM(s) Oral at bedtime  buDESOnide   0.5 milliGRAM(s) Respule 0.5 milliGRAM(s) Nebulizer two times a day  calcium carbonate 1250 mG  + Vitamin D (OsCal 500 + D) 1 Tablet(s) Oral daily  heparin  Injectable 5000 Unit(s) SubCutaneous every 12 hours  hydroxyurea 1000 milliGRAM(s) Oral every other day  hydroxyurea 500 milliGRAM(s) Oral every other day  ipratropium    for Nebulization 500 MICROGram(s) Nebulizer every 6 hours  levothyroxine 100 MICROGram(s) Oral <User Schedule>  montelukast 10 milliGRAM(s) Oral daily  tiotropium 18 MICROgram(s) Capsule 1 Capsule(s) Inhalation daily

## 2021-01-21 NOTE — PROGRESS NOTES
Pt participated in part of a dance/movement therapy (DMT) along with verbal interventions.  Pt appeared cognitively fuzzy in verbal interactions but joined directed movements that were cohesive and organizing. He appeared more clear during these nonverbal interactions that supported an organized orientation to environment and space, as well as connection to peers embodied.  Pt did share clearly about his family.       01/21/21 1015   Dance Movement Therapy   Type of Intervention structured groups   Response participates with encouragement   Hours 0.5

## 2021-01-21 NOTE — PLAN OF CARE
"  Problem: Psychotic Symptoms  Goal: Psychotic Symptoms  Description: Signs and symptoms of listed problems will be absent or manageable.  Outcome: Improving   Pt is questioning his ability to do the MMPI as he is redoing it tonight. We discussed no right or wrong, just answer the questions. Pt states he can't answer them honestly. He remains a little uncertain of himself but he does talk about past issues, minimally but he is presenting much better than last week upon his arrival. He agrees and states he feels better. He denies paranoia and feels he is clearing. Will continue to assess.   At 2145, pt was continuing work on the millon & MMPI. He already has redone the MMPI and requested to this RN to redo the millon. We discussed his reading way to deep into these tests (analizing the sentences) and that he should just answer them as a yes or no, true or false. Pt then began to discuss in a disoriented manor how this might have been him one week but not the next and his friends are not in tune to this and...... So we discussed not making this so hard and just answer to the best of his ability, yes or no, true or false. Pt was unable to comprehend this and struggled greatly with trying to \"figure it out\". He apparently had deep concern as to ,why or what these tests were about altho this was explained to him. This RN encouraged him to just do the best he could do, that there was no right or wrong. By the look he gave to this RN, his affect, He remained perplexed.  "

## 2021-01-21 NOTE — PLAN OF CARE
"  Problem: Psychotic Symptoms  Goal: Psychotic Symptoms  Description: Signs and symptoms of listed problems will be absent or manageable.  Outcome: No Change   Pt remains confused as to the time limit of the millon and MMPI. The MMPI was turned in. He continues to struggle greatly with his own internal delusions as to why the tests are so hindering for him. \"I.....just can't do it because I wrestle with myself\"...?  Will continue to assess.   "

## 2021-01-22 ENCOUNTER — APPOINTMENT (OUTPATIENT)
Dept: MRI IMAGING | Facility: CLINIC | Age: 21
End: 2021-01-22
Attending: CLINICAL NURSE SPECIALIST
Payer: COMMERCIAL

## 2021-01-22 VITALS
TEMPERATURE: 98.1 F | BODY MASS INDEX: 20.5 KG/M2 | DIASTOLIC BLOOD PRESSURE: 69 MMHG | WEIGHT: 164 LBS | SYSTOLIC BLOOD PRESSURE: 122 MMHG | RESPIRATION RATE: 16 BRPM | OXYGEN SATURATION: 96 % | HEART RATE: 100 BPM

## 2021-01-22 PROCEDURE — 70551 MRI BRAIN STEM W/O DYE: CPT | Mod: 26 | Performed by: RADIOLOGY

## 2021-01-22 PROCEDURE — 99232 SBSQ HOSP IP/OBS MODERATE 35: CPT | Mod: 95 | Performed by: CLINICAL NURSE SPECIALIST

## 2021-01-22 PROCEDURE — 124N000002 HC R&B MH UMMC

## 2021-01-22 PROCEDURE — 250N000012 HC RX MED GY IP 250 OP 636 PS 637: Performed by: CLINICAL NURSE SPECIALIST

## 2021-01-22 PROCEDURE — G0177 OPPS/PHP; TRAIN & EDUC SERV: HCPCS

## 2021-01-22 PROCEDURE — 250N000013 HC RX MED GY IP 250 OP 250 PS 637: Performed by: CLINICAL NURSE SPECIALIST

## 2021-01-22 PROCEDURE — 70551 MRI BRAIN STEM W/O DYE: CPT

## 2021-01-22 RX ORDER — LORAZEPAM 1 MG/1
1 TABLET ORAL ONCE
Status: COMPLETED | OUTPATIENT
Start: 2021-01-22 | End: 2021-01-22

## 2021-01-22 RX ADMIN — Medication 1 CAPSULE: at 08:51

## 2021-01-22 RX ADMIN — AZATHIOPRINE 100 MG: 50 TABLET ORAL at 08:52

## 2021-01-22 RX ADMIN — Medication 1 CAPSULE: at 22:19

## 2021-01-22 RX ADMIN — OLANZAPINE 10 MG: 10 TABLET, ORALLY DISINTEGRATING ORAL at 22:19

## 2021-01-22 RX ADMIN — LEVOMEFOLATE CALCIUM 7.5 MG TABLET 7.5 MG: TABLET at 08:51

## 2021-01-22 RX ADMIN — LORAZEPAM 1 MG: 1 TABLET ORAL at 13:49

## 2021-01-22 RX ADMIN — Medication 10 MG: at 22:19

## 2021-01-22 ASSESSMENT — ACTIVITIES OF DAILY LIVING (ADL)
ORAL_HYGIENE: INDEPENDENT
LAUNDRY: UNABLE TO COMPLETE
DRESS: STREET CLOTHES;INDEPENDENT
HYGIENE/GROOMING: INDEPENDENT

## 2021-01-22 NOTE — PROGRESS NOTES
INITIAL OT ASSESSMENT     01/15/21 1400   General Information   Date Initially Attended OT 01/18/21   Has Not Attended OT as of: 01/15/21   Clinical Impression   Affect Flat;Restricted   Orientation Oriented to person, place and time   Appearance and ADLs General cleanliness observed in most areas   Attention to Internal Stimuli Appears distracted/preoccupied internally   Interaction Skills Guarded;Interacts with prompts, minimal response   Ability to Communicate Needs Indirect;Independent   Verbal Content Selective;Appropriate to topic;Delayed response   Ability to Maintain Boundaries Maintains appropriate physical boundaries;Maintains appropriate verbal boundaries   Participation Initiates participation   Concentration Concentrates 20-30 minutes   Ability to Concentrate With structure;Preoccupied   Follows and Comprehends Directions Independently follows multi-step directions   Memory Delayed and immediate recall intact   Organization Independently organizes all tasks   Decision Making Independent   Planning and Problem Solving Indentifies problems but not alternatives;Needs assistance   Ability to Apply and Learn Concepts Comprehends concepts, but needs assist to apply   Frustrations / Stress Tolerance Independently identifies sources of frustration/stress   Level of Insight Other (see comments)  (limited)   Self Esteem Poor self esteem;Accepts positive feedback;Takes risks with support and encouragement   Social Supports Has knowledge of support systems

## 2021-01-22 NOTE — DISCHARGE SUMMARY
"Psychiatric Discharge Summary    Julian Berg MRN# [id number]   Age: 20 year old YOB: 2000     Date of Admission:  1/14/2021  Date of Discharge:  1/23/2021  Admitting Physician:  Farhan Castillo MD  Discharge Physician:  Debra A. Naegele, APRN CNS (Contact: [phone])         Event Leading to Hospitalization:   Julian Berg is a 20-year-old single  male presenting with a history of psychosis, depression, general anxiety disorder and substance abuse who is presenting with possible psychosis and suicidal ideation.  The patient was brought in by mother to the [facility] Emergency Room.  The patient was at the [facility] day treatment.  The patient was not able to tolerate day treatment, and it was suggested that mother bring him to the emergency room for further evaluation.  The patient signed a release of information for mother.  Mother is reporting that the patient put all of his money into [employer].  This is including all of his college funds.  Mother is suspicious that patient may have been involved in some illegal activities.  Mother reports that when patient was talking to her, he used the word counterfeit.  The patient has been stealing items and alcohol from friends and from friends' parents.  The patient made the statement, \"I have lied my whole life.\"  The patient thinks he is going to group home.  The patient has been stealing from [employer] and from [employer].  Mother reports the reason why he came to the hospital is because he stated, \"I can't live anymore because of what I have done.\"  The patient was researching ways in order to kill himself.  The patient is experiencing thought blocking.  The patient has a lot of difficulty answering questions.  When asked the question, there will be a long pause, and then patient forgets what the question is, repeated, another long pause, and then patient does not answer the question.  Goal for this hospitalization is to stabilize mood and " psychological testing for diagnosis clarification.        See Admission note by Naegele, Debra Ann, APRN CNS on 1/15/2021 for additional details.          DIagnoses:   F29, unspecified psychotic disorder, cannabis use disorder, moderate, in recent sustained remission.  Rule out F41.9, unspecified anxiety disorder with obsessive features.          Labs:     Results for orders placed or performed during the hospital encounter of 01/14/21   MR Brain w/o Contrast     Status: None    Narrative    MR BRAIN W/O CONTRAST 1/22/2021 4:40 PM    Provided History: Psychosis  ICD-10:    Comparison:  None available     Technique: Sagittal T1-weighted, coronal T2m and axial T2-weighted,  turboFLAIR, susceptibility weighted and diffusion-weighted with ADC  map images of the brain were obtained without intravenous contrast.    Findings: These images reveal no intracranial mass lesion, mass  effect, midline shift or abnormal extraaxial fluid collection. The  ventricles and sulci are normal for age. No abnormality of reduced  diffusion.  Normal intravascular flow voids. Susceptibility and  diffusion weighted images show no focal abnormality.      Impression    Impression: No acute intracranial pathology.    I have personally reviewed the examination and initial interpretation  and I agree with the findings.    KIT ONEAL MD   Comprehensive metabolic panel     Status: None   Result Value Ref Range    Sodium 140 133 - 144 mmol/L    Potassium 3.9 3.4 - 5.3 mmol/L    Chloride 103 94 - 109 mmol/L    Carbon Dioxide 30 20 - 32 mmol/L    Anion Gap 7 3 - 14 mmol/L    Glucose 98 70 - 99 mg/dL    Urea Nitrogen 15 7 - 30 mg/dL    Creatinine 0.90 0.66 - 1.25 mg/dL    GFR Estimate >90 >60 mL/min/[1.73_m2]    GFR Estimate If Black >90 >60 mL/min/[1.73_m2]    Calcium 9.5 8.5 - 10.1 mg/dL    Bilirubin Total 1.2 0.2 - 1.3 mg/dL    Albumin 4.3 3.4 - 5.0 g/dL    Protein Total 7.8 6.8 - 8.8 g/dL    Alkaline Phosphatase 49 40 - 150 U/L    ALT 19 0 -  70 U/L    AST 17 0 - 45 U/L   CBC with platelets differential     Status: None   Result Value Ref Range    WBC 8.0 4.0 - 11.0 10e9/L    RBC Count 5.02 4.4 - 5.9 10e12/L    Hemoglobin 16.3 13.3 - 17.7 g/dL    Hematocrit 47.4 40.0 - 53.0 %    MCV 94 78 - 100 fl    MCH 32.5 26.5 - 33.0 pg    MCHC 34.4 31.5 - 36.5 g/dL    RDW 12.1 10.0 - 15.0 %    Platelet Count 284 150 - 450 10e9/L    Diff Method Automated Method     % Neutrophils 51.2 %    % Lymphocytes 36.0 %    % Monocytes 9.2 %    % Eosinophils 3.0 %    % Basophils 0.5 %    % Immature Granulocytes 0.1 %    Nucleated RBCs 0 0 /100    Absolute Neutrophil 4.1 1.6 - 8.3 10e9/L    Absolute Lymphocytes 2.9 0.8 - 5.3 10e9/L    Absolute Monocytes 0.7 0.0 - 1.3 10e9/L    Absolute Eosinophils 0.2 0.0 - 0.7 10e9/L    Absolute Basophils 0.0 0.0 - 0.2 10e9/L    Abs Immature Granulocytes 0.0 0 - 0.4 10e9/L    Absolute Nucleated RBC 0.0    Lipid panel     Status: Abnormal   Result Value Ref Range    Cholesterol 198 <200 mg/dL    Triglycerides 82 <150 mg/dL    HDL Cholesterol 75 >39 mg/dL    LDL Cholesterol Calculated 107 (H) <100 mg/dL    Non HDL Cholesterol 123 <130 mg/dL   TSH with free T4 reflex and/or T3 as indicated     Status: None   Result Value Ref Range    TSH 1.42 0.40 - 4.00 mU/L   HIV Antigen Antibody Combo     Status: None   Result Value Ref Range    HIV Antigen Antibody Combo Nonreactive NR^Nonreactive       Lyme Confirm IgG by Immunoblot     Status: None   Result Value Ref Range    Lyme Confirm IgG by Immunoblot Negative Negative   CBC with platelets differential     Status: None   Result Value Ref Range    WBC 6.9 4.0 - 11.0 10e9/L    RBC Count 4.81 4.4 - 5.9 10e12/L    Hemoglobin 15.6 13.3 - 17.7 g/dL    Hematocrit 46.9 40.0 - 53.0 %    MCV 98 78 - 100 fl    MCH 32.4 26.5 - 33.0 pg    MCHC 33.3 31.5 - 36.5 g/dL    RDW 12.2 10.0 - 15.0 %    Platelet Count 210 150 - 450 10e9/L    Diff Method Automated Method     % Neutrophils 48.7 %    % Lymphocytes 33.3 %    %  "Monocytes 10.9 %    % Eosinophils 5.6 %    % Basophils 0.9 %    % Immature Granulocytes 0.6 %    Nucleated RBCs 0 0 /100    Absolute Neutrophil 3.4 1.6 - 8.3 10e9/L    Absolute Lymphocytes 2.3 0.8 - 5.3 10e9/L    Absolute Monocytes 0.8 0.0 - 1.3 10e9/L    Absolute Eosinophils 0.4 0.0 - 0.7 10e9/L    Absolute Basophils 0.1 0.0 - 0.2 10e9/L    Abs Immature Granulocytes 0.0 0 - 0.4 10e9/L    Absolute Nucleated RBC 0.0    Vitamin B12     Status: None   Result Value Ref Range    Vitamin B12 549 193 - 986 pg/mL            Consults:   Consultation during this admission received from psychology for diagnosis clarification.             Varghese Mike, PhD LP   Psychologist   Health Psychology   Consults      Signed   Date of Service:  1/21/2021  5:13 PM   Creation Time:  1/21/2021  7:05 PM                 []Hide copied text    []Xavier for details  Consult Date:  01/19/2021      PSYCHOLOGICAL EVALUATION      DEMOGRAPHICS AND BACKGROUND INFORMATION:  This is a 20-year-old single male who was admitted to the Young Adult Inpatient Mental Health Unit at the [facility] [place], due to concerns related to increased intensity of depressive and anxiety symptoms as well as suicidality and possible psychotic processing.  He was referred for a psychological evaluation by Debra Naegele, APRN, CNS, to aid with diagnostic impressions and treatment recommendations.      This patient noted that he was hospitalized because \"my mom took me because I was making suicidal threats.  I was just saying it and I was just upset.  It was just weeks, just not getting anything done.  It was how I went about college and a lot of things in life.  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  He was trying to switch to business \"because the rigor is less.\"  The patient is evidently taking a " "leave of absence for at least a year.  He noted other stressors including getting good grades and that \"I've been bouncing around in relationships.\"  He stated that he was in a relationship with a girlfriend for 2 months, but she wanted to \"take a break recently.\"      This patient does not feel that being on the unit has been helpful to him.  He stated that he is discouraged by \"all the checking up\" on him.  He claims to have had 2 previous hospitalizations during college because \"my parents were scared for my safety.\"  He denies having a plan to harm himself and has never attempted.  He denied self-injurious behaviors.      This patient does have a history of depressive symptoms and noted sadness, difficulty concentrating, irritability, difficulty falling and staying asleep, decreased motivation and recent suicidal ideation.  He noted what would keep him from attempting in the future would be his \"supportive family.\"      This patient does worry about relationships, school and his future.  He may have some possible catastrophic thinking and has had some social avoidance.  He has panicked in the past in which his heart races.  He does obsess about his future and his decision-making and college.  He has shown some evidence of paranoid ideation.  In fact, he was concerned in the Emergency Department about the television.  Documentation states that he thought that the television there was sending him messages, but only told this clinician that \"it was playing reruns.\"  He also told his parents that he had invested all of his money in Bitcoin, but now denies this.  Documentation also states that his mother is \"suspicious that the patient may have been involved in some illegal activities.\"  Yet he did not endorse any of these behaviors.  He denied any auditory or visual hallucinations.  He denied being physically, emotionally or sexually abused.  He denied any history of head trauma.      This patient denied vandalizing " "behaviors.  He did admit to stealing food from [employer], but has never been caught.  Documentation states that he has been stealing items and alcohol from his friends and from his friend's parents.  In fact, he stated to clinical staff, \"I've lied my whole life.\"  He also thought that he was going to be going to senior living.  He has used the word \"counterfeit\" to his mother, but again, there was no evidence of this with this clinician.  This patient first tried alcohol at age 18, used it on weekends sometimes to intoxication and has a history of blacking out and passing out.  He last drank a month ago.  He first tried cannabis at age 18, did so up to a daily basis and last did so in November.  He claims \"it was a social thing.\"  He tried acid 3 times \"a long time ago\" and Adderall twice to help him study.  He is also vaped with nicotine in the past.      This patient is originally from [place], [place].  His mother and father  when this patient was in the 4th grade and his father remarried.  He stated that he has a fraternal twin brother who is attending school at [employer], but is doing some classes virtually.  He stated that his mother is a  and his father is a  for [employer].  He claims that his maternal grandmother has a history of obsessive-compulsive disorder.  When asked about his mother, he noted \"she is super loving, caring and supportive.\"  In fact, he said the same thing about his father.      This patient graduated from [employer] in 2019 and stated that he received straight A's,  yet he claims \"I think I was using resources to get the grades like the Internet\" and now does not feel that he deserves the grades he received.  He did play intramural basketball, ultimate Frisbee and ran track.  During his leisure time, he likes to spend it with friends, watch movies and watch television.  He was able to name his roommate, a friend from high school and " another friend as individuals in whom he can confide.      MENTAL STATUS:  This patient came to the evaluation setting dressed casually, although appropriately.  He was seen through [facility] platform due to COVID-19 restrictions.  He was generally cooperative but did not appear to be tracking particularly well.  There seemed to be some evidence of thought blocking.  In the recent past there was some evidence of poor hygiene and attendance to ADLs.  His affect seemed to be anxious and paranoid and it was unclear if his mood was consistent with his affect.  He seemed to have difficulty responding to this clinician's questions at times.  There seems to be some evidence of obsession, but no evidence of compulsions.  He did endorse suicidal ideation prior to coming in the hospital, but no evidence of homicidal ideation.  He denies hallucinations, but there may be some evidence of delusions as well as paranoid ideation.  As a result, one cannot rule out the possibility of psychotic processing.  He was oriented to person, place and time.      TESTS ADMINISTERED AND TASKS COMPLETED:  Diagnostic interview, review of medical records, [place] Multiphasic Personality Inventory-2 (MMPI-2), [place] Clinical Multiaxial Inventory-III (MCMI-III), Rorschach Test, Doherty Depression Inventory-II (BDI-II), Doherty Anxiety Inventory (MU).      TEST RESULTS:  It should be noted that this patient did go through psychological evaluation on 12/05/2019 with Naomi Granadso PsyD, YENP, ZOFIA, LP.  At that time she diagnosed him with adjustment disorder with anxiety.  He was also ruminating about his school decision even at that time.  He also went through other testing in 02/2020 and was administered the Wechsler Adult Intelligence Scale-4th Edition (WAIS-IV), the [place] Multiphasic Personality Inventory-2 restructured form (MMPI-2-RF) and the Doherty Anxiety Inventory (MU).  The report could be seen in his chart.  He did show intellectual  "functioning in the high average to superior range.  He also seemed to show evidence of mild anxiety and has difficulty coping with the various stressors in his life.      This patient skipped so many items on the most recent MMPI-2 that the profile is neither valid nor interpretable.  He evidently ruminated about how to answer the question and was concerned about how he would be perceived by this clinician and others.        The MCMI-III was responded to in an inconsistent manner to the point where the profile may have questionable validity.  These individuals tend to have some difficulty understanding some of the items or were confused.  He also omitted a number of items.  Otherwise, interpretation will need to be done with caution.  These individuals manifest depressive and anxiety symptoms.  They believe they have a history of trauma.  They are seen as emotionally dysregulated.  They usually are somewhat dependent upon others.  They seem to have some somatic based complaints and worry about their health.  They struggle in certain social situations and may feel disconnected from others.  They believe that their mood is erratic.  They may feel they have a poor sense of self and feel \"empty inside.\"      The Rorschach test was attempted to be administered, but he had too much difficulty being able to endorse enough responses to deem it a valid and interpretable protocol.  In fact, he appeared quite paranoid during the process.  He also had not completed the BDI-2 or the MU as of this report.      SUMMARY OF CURRENT FINDINGS:  This is a 20-year-old single male who was admitted to the Young Adult Inpatient Mental Health Unit at [facility], due to concerns related to increased intensity of depressive and anxiety symptoms as well as suicidality and possible psychotic processing.  He stated that he was brought to the hospital because he was making \"suicidal threats.\"  Yet he seemed " "to minimize and claimed that he made these statements because he was upset.  He admitted, \"it was just weeks of just not getting anything done.  How I went about college and a lot of things in life.  It's just relationships and I have not been doing my work.\"  This statement was indicative of much of the interview, as he had difficulty tracking, would ruminate and had difficulty responding appropriately to this clinician's questions.  He has shown evidence of paranoid ideation and has made bizarre statements, for example, about the television in the Emergency Department possibly sending messages.  He told his family that he had invested all of his money in Bitcoin which this patient now denies to this clinician.  He has made statements to his mother about being concerned that he would be sent to [facility], that he has \"lied his whole life\" and that he has used the word \"counterfeit\" leading his mother to believe that he has engaged in illegal activities. Yet this patient denied any of these statements to this clinician.  He denied any auditory or visual hallucinations.  He has shown some evidence of confusion and thought blocking while on the unit, but this evidently has improved over the course of his hospital stay.  He has used cannabis up to a daily basis, the last of which was in November, but called it a \"social thing.\"  He also tried acid 3 times \"a long time ago\" and Adderall twice to help with studying.  He has stolen food from [employer], but documentation states that he may have stolen various items and alcohol from his friend and friend's parents.  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      Psychological testing was limited in that he omitted too many items on the MMPI-2.  He also did not endorse enough responses on the Rorschach Test to deem it a valid and " interpretable protocol.  This clinician still has not received BDI-2 or MU.  The MCMI-III suggested some level of distress manifested by depressive and anxiety symptoms.  He seems to be somewhat disconnected from others and has a poor sense of self.  There may be some evidence of emotional dysregulation.  He does appear to be somewhat distrustful of others.  Any further interpretation would need to be done with caution.      Overall, I have concerns about this patient's level of emotional distress, rumination about school, thought blocking and paranoid ideation.  Previous psychological testing did suggest that he was ruminating about his decision-making in school even over a year ago.  Yet this has seemingly become more obsessive and paranoid in nature.  He shows some hallmarks of first episode psychotic disorder which will need to be investigated more fully within a comprehensive program.  He claims to have been sober from cannabis for over 2 months and has not used Adderall or acid for a long period before that.  Thus, it is unlikely that his current state of functioning is the result of substance use.  He does appear to be at a moderate risk for continued suicidality and substance use based on his level of impulsivity and history.      DIAGNOSTIC IMPRESSION:   PRINCIPAL DIAGNOSES:  F29, unspecified psychotic disorder, cannabis use disorder, moderate, in recent sustained remission.  Rule out F41.9, unspecified anxiety disorder with obsessive features.      TREATMENT RECOMMENDATIONS:   1.  This patient would benefit from First Episode Psychosis Clinic to more fully assess and treat psychotic disorder.   2.  Day treatment will be helpful to promote mood stability and more fully monitor and treat his psychotic symptoms.   3.  Reality based individual psychotherapy will be necessary to focus on improved coping mechanisms.   4.  Family psychotherapy will be necessary to focus on improved communication within the  "family dynamic and set appropriate boundaries.   5.  Random urine drug screens will be necessary to ensure sobriety.   6.  Medication management may be helpful to promote mood stability and decrease psychotic symptomatology.   7.  This patient should be encouraged to find alternative activities in which to engage so he may feel more appropriately empowered.   8.  This clinician will continue to consult with this patient, this patient's family and Debra Naegele if necessary.         PHILLIP MAN, PHD, [facility] Course:   Julian Berg was admitted to [facility] with attending Farhan Castillo MD as a voluntary patient. The patient was placed under status 15 (15 minute checks) to ensure patient safety.     Patient was started on Zyprexa 10 mg to address psychosis and agitation.  Patient reported feeling that his thinking was \"slowed\". Discussed a trial of Abilify but patient declined. Discussed risks, benefits and side effects of medication with patient and with mother.    First episode workup was negative.    Patient was educated on the detrimental effects of mood altering substances on his mental health. Recommendation was to abstain from all mood altering substances.     Please see psychology testing consult note. Recommendation was Navigate program.    Julian Berg did participate in groups and was visible in the milieu. The patient's symptoms of suicidal idaiton improved. Patient has protective factors of seeking out treatment and supportive parents.     Julian Berg was released to home into mother's care. At the time of discharge Julian Berg was determined to not be a danger to himself or others.          Discharge Medications:     Current Discharge Medication List      CONTINUE these medications which have NOT CHANGED    Details   adalimumab (HUMIRA) 40 MG/0.8ML prefilled syringe kit Inject 40 mg Subcutaneous Every Monday      azaTHIOprine 100 MG TABS Take 100 " mg by mouth daily          STOP taking these medications       diphenhydrAMINE (BENADRYL ALLERGY) 25 MG capsule Comments:   Reason for Stopping:         EPINEPHrine (EPIPEN/ADRENACLICK/OR ANY BX GENERIC EQUIV) 0.3 MG/0.3ML injection 2-pack Comments:   Reason for Stopping:         lactobacillus rhamnosus, GG, (CULTURELL) capsule Comments:   Reason for Stopping:         melatonin 3 MG CAPS Comments:   Reason for Stopping:                    Psychiatric Examination:   Appearance:  awake, alert and adequately groomed  Attitude:  guarded  Eye Contact:  fair  Mood:  anxious  Affect:  intensity is blunted  Speech:  paucity of speech  Psychomotor Behavior:  no evidence of tardive dyskinesia, dystonia, or tics  Thought Process:  disorganized  Associations:  no loose associations  Thought Content:  paranoia  Insight:  limited  Judgment:  limited  Oriented to:  time, person, and place  Attention Span and Concentration:  intact  Recent and Remote Memory:  fair  Language: Able to name objects, Able to repeat phrases and Able to read and write  Fund of Knowledge: low-normal  Muscle Strength and Tone: normal  Gait and Station: Normal         Discharge Plan:   Behavioral Discharge Planning and Instructions        Summary: You were admitted on 1/14/2021 to [address] due to psychosis.  You were treated by Debra Naegele, APRN, CNS and discharged on 01/23/2021 to Home     Principal Diagnosis:   Unspecified psychosis    Rule out substance induced psychosis  Major depressive disorder, recurrent, severe with psychosis.   Cannabis use disorder, severe.   Nicotine dependence.   Crohn's disease.      Health Care Follow-up Appointments:   Navigate program Intake appointment   Date: 02/16/2021  Time: 11:00am (This will be a virtual appointment. Log into [employer]. Click on your appointment to join your meeting.)      Provider: Krystal Rodriguez  Address: [address] [facility], [address] [place] [address]   Phone: [phone]         Attend  all scheduled appointments with your outpatient providers. Call at least 24 hours in advance if you need to reschedule an appointment to ensure continued access to your outpatient providers.   Major Treatments, Procedures and Findings: You were provided with: a psychiatric assessment, assessed for medical stability, medication evaluation and/or management, group therapy, art therapy, milieu management, medical interventions and skills/OT groups.     Symptoms to Report: If you experience any of the following symptoms please report them right away to your provider or to family/friends; feeling more aggressive, increased confusion, losing more sleep, mood getting worse or thoughts of suicide.     Early warning signs can include: Early warning signs that could signal a potential relapse could include but not limited to the following; increased depression or anxiety sleep disturbances increased thoughts or behaviors of suicide or self-harm increased unusual thinking, such as paranoia or hearing voices.      Safety and Wellness:  Take all medicines as directed.  Make no changes unless your doctor suggests them. Follow treatment recommendations.  Refrain from alcohol and non-prescribed drugs. If there is a concern for safety, call 911.     Resources: Mental health crisis response for your [facility] is offered 24 hours a day, 7 days a week. A trained counselor will assess your current situation, offer support and counseling and connect you with local resources. Please call  [facility] Crisis (COPE) Response - Adult [phone]     Crisis Intervention: 490.408.8818 or 882-129-4090 (TTY: 277.783.3697).  Call anytime for help.  National [place] on Mental Illness (www.[place].tish.org): 878.604.6480 or 908-058-1340.  Suicide Awareness Voices of Education (SAVE) (www.save.org): 270-934-DXCB (1184)  National Suicide Prevention Line (www.mentalhealthmn.org): 512-875-PGZL (6624)  Mental Health Consumer/Survivor Network of [place]  "(www.mhcsn.net): 129-930-0059 or 807-095-3861  Mental Health Association of [place] (www.mentalhealth.org): 559.444.1324 or 173-773-1012  Self- Management and Recovery Training., SMART-- Toll free: [phone]  [employer]  Text 4 Life: txt \"LIFE\" to 61371 for immediate support and crisis intervention  Crisis text line: Text \"[place]\" to 708301. Free, confidential, 24/7.     The treatment team has appreciated the opportunity to work with you. Julian, please take care and make your recovery a daily recovery. If you have any questions or concerns our unit number is [phone].  You will be receiving a follow-up phone call within the next three days from a representative from behavioral health.  You have identified the best phone number to reach you as [phone] (home)        Attestation:  The patient has been seen and evaluated by me,  Debra A. Naegele, APRN CNS on 1/23/2021  Discharge summary time > 30 minutes     Video-Visit Details     Type of service:  Video Visit     Video Start Time (time video started): 1210     Video End Time (time video stopped): 1224    Originating Location (pt. Location): Other Station [facility]     Distant Location (provider location): Provider remote location     Mode of Communication:  Video Conference via [employer]     Physician has received verbal consent for a Video Visit from the patient? Yes        "

## 2021-01-22 NOTE — PLAN OF CARE
Work Completed: Team meeting, Chart review, Writer completed AVS.     Discharge plan or goal: Navigate program                Barriers to discharge: Stabilization

## 2021-01-22 NOTE — PROGRESS NOTES
01/22/21 0600   Sleep/Rest/Relaxation   Sleep/Rest/Relaxation (WDL) WDL   Sleep/Rest/Relaxation appears asleep   Night Time # Hours 7 hours   Focus: Shift summary    Data: Patient slept 7 hours last night. No interventions needed. Respirations even and unlabored on status 15 checks. Will continue to monitor and report to oncoming staff.    Response: Report sleep hours to day shift. Continue to monitor patient and provide therapeutic interventions as necessary.

## 2021-01-22 NOTE — PLAN OF CARE
"  Problem: Depressive Symptoms  Goal: Depressive Symptoms  Description: Signs and symptoms of listed problems will be absent or manageable.  Outcome: No Change  Flowsheets (Taken 1/22/2021 1353)  Depressive Symptoms Assessed:    affect    mood     Problem: Psychotic Symptoms  Goal: Psychotic Symptoms  Description: Signs and symptoms of listed problems will be absent or manageable.  Outcome: No Change  Flowsheets (Taken 1/22/2021 1353)  Psychotic Symptoms Assessed: thought process  Psychotic Symptoms Present: thought process     Problem: Suicidal Behavior  Goal: Suicidal Behavior is Absent or Managed  Outcome: Improving  Flowsheets (Taken 1/22/2021 1353)  Mutually Determined Action Steps (Suicidal Behavior Absent/Managed): (denies) shares suicidal thoughts  Patient denies SI/SIB, but continue to endorse depression and anxiety. Patient is still paranoid and struggling with thought blocking and limited insight.   Patient reported to writer early in the morning that he took an antipsychotic medication last evening feels great. When patient checked in with the provider he denied saying anything like that.   Patient is visible in the milieu but isolative to himself. He attended group and they baked cookies, however very minimal or no interaction was observed.   During meeting with Dali, patient would not let writer sit in with him. He believes that it is not the \"rule\" for writer to listen to his treatment plan.   Patient has an order for MRI this afternoon. Check list has been completed on the charge nurse's clip board.   MRI will call between [phone] for patient to go down based on available opening. Patient has received a one dose of Ativan 1mg to manage his anxiety through the process.     Patient has a discharge order in for tomorrow to home.     Appetite: Good  PRNs: Ativan tab 1 mg one time dose for anxiety with MRI.   "

## 2021-01-22 NOTE — DISCHARGE INSTRUCTIONS
Behavioral Discharge Planning and Instructions      Summary: You were admitted on 1/14/2021 to Station [address] [place] [address] due to psychosis.  You were treated by Debra Naegele, APRN, CNS and discharged on 01/22/2021 to Home    Principal Diagnosis:   Unspecified psychosis    Rule out substance induced psychosis  Major depressive disorder, recurrent, severe with psychosis.   Cannabis use disorder, severe.   Nicotine dependence.   Crohn's disease.     Health Care Follow-up Appointments:   Navigate program Intake appointment   Date: 02/16/2021  Time: 11:00am (This will be a virtual appointment. Log into [facility]. Click on your appointment to join your meeting.)      Provider: Krystal Rodriguez  Address: [address] [facility], [address] [place] [address]   Phone: [phone]       Attend all scheduled appointments with your outpatient providers. Call at least 24 hours in advance if you need to reschedule an appointment to ensure continued access to your outpatient providers.   Major Treatments, Procedures and Findings: You were provided with: a psychiatric assessment, assessed for medical stability, medication evaluation and/or management, group therapy, art therapy, milieu management, medical interventions and skills/OT groups.    Symptoms to Report: If you experience any of the following symptoms please report them right away to your provider or to family/friends; feeling more aggressive, increased confusion, losing more sleep, mood getting worse or thoughts of suicide.    Early warning signs can include: Early warning signs that could signal a potential relapse could include but not limited to the following; increased depression or anxiety sleep disturbances increased thoughts or behaviors of suicide or self-harm increased unusual thinking, such as paranoia or hearing voices.     Safety and Wellness:  Take all medicines as directed.  Make no changes unless your doctor suggests them. Follow treatment recommendations.  Refrain from  "alcohol and non-prescribed drugs. If there is a concern for safety, call 911.    Resources: Mental health crisis response for your county is offered 24 hours a day, 7 days a week. A trained counselor will assess your current situation, offer support and counseling and connect you with local resources. Please call  [facility] Crisis (COPE) Response - Adult [phone]    Crisis Intervention: 275.623.8634 or [phone] (TTY: 792.979.5871).  Call anytime for help.  National [place] on Mental Illness (www.mn.tish.org): 123.250.1244 or 317-374-6968.  Suicide Awareness Voices of Education (SAVE) (www.save.org): 538-352-GTCX (6779)  National Suicide Prevention Line (www.mentalhealthmn.org): 037-554-WVPI (6662)  Mental Health Consumer/Survivor Network of [place] (www.mhcsn.net): 268.638.7552 or 581-816-6549  Mental Health Association of [place] (www.mentalhealth.org): 821.912.3754 or 082-943-4534  Self- Management and Recovery Training., CUPS-- Toll free: [phone]  www.[employer]  Text 4 Life: txt \"LIFE\" to 01586 for immediate support and crisis intervention  Crisis text line: Text \"[place]\" to 955902. Free, confidential, 24/7.    The treatment team has appreciated the opportunity to work with you. Julian, please take care and make your recovery a daily recovery. If you have any questions or concerns our unit number is [phone].  You will be receiving a follow-up phone call within the next three days from a representative from behavioral health.  You have identified the best phone number to reach you as [phone] (home)       "

## 2021-01-22 NOTE — CONSULTS
"Consult Date:  01/19/2021      PSYCHOLOGICAL EVALUATION      DEMOGRAPHICS AND BACKGROUND INFORMATION:  This is a 20-year-old single male who was admitted to the Young Adult Inpatient Mental Health Unit at the [facility] [place], due to concerns related to increased intensity of depressive and anxiety symptoms as well as suicidality and possible psychotic processing.  He was referred for a psychological evaluation by Debra Naegele, APRN, CNS, to aid with diagnostic impressions and treatment recommendations.      This patient noted that he was hospitalized because \"my mom took me because I was making suicidal threats.  I was just saying it and I was just upset.  It was just weeks, just not getting anything done.  It was how I went about college and a lot of things in life.  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  He was trying to switch to business \"because the rigor is less.\"  The patient is evidently taking a leave of absence for at least a year.  He noted other stressors including getting good grades and that \"I've been bouncing around in relationships.\"  He stated that he was in a relationship with a girlfriend for 2 months, but she wanted to \"take a break recently.\"      This patient does not feel that being on the unit has been helpful to him.  He stated that he is discouraged by \"all the checking up\" on him.  He claims to have had 2 previous hospitalizations during college because \"my parents were scared for my safety.\"  He denies having a plan to harm himself and has never attempted.  He denied self-injurious behaviors.      This patient does have a history of depressive symptoms and noted sadness, difficulty concentrating, irritability, difficulty falling and staying asleep, decreased motivation and recent suicidal ideation.  He noted what would " "keep him from attempting in the future would be his \"supportive family.\"      This patient does worry about relationships, school and his future.  He may have some possible catastrophic thinking and has had some social avoidance.  He has panicked in the past in which his heart races.  He does obsess about his future and his decision-making and college.  He has shown some evidence of paranoid ideation.  In fact, he was concerned in the Emergency Department about the television.  Documentation states that he thought that the television there was sending him messages, but only told this clinician that \"it was playing reruns.\"  He also told his parents that he had invested all of his money in Bitcoin, but now denies this.  Documentation also states that his mother is \"suspicious that the patient may have been involved in some illegal activities.\"  Yet he did not endorse any of these behaviors.  He denied any auditory or visual hallucinations.  He denied being physically, emotionally or sexually abused.  He denied any history of head trauma.      This patient denied vandalizing behaviors.  He did admit to stealing food from [employer], but has never been caught.  Documentation states that he has been stealing items and alcohol from his friends and from his friend's parents.  In fact, he stated to clinical staff, \"I've lied my whole life.\"  He also thought that he was going to be going to nursing home.  He has used the word \"counterfeit\" to his mother, but again, there was no evidence of this with this clinician.  This patient first tried alcohol at age 18, used it on weekends sometimes to intoxication and has a history of blacking out and passing out.  He last drank a month ago.  He first tried cannabis at age 18, did so up to a daily basis and last did so in November.  He claims \"it was a social thing.\"  He tried acid 3 times \"a long time ago\" and Adderall twice to help him study.  He is also vaped with nicotine in the past.    " "  This patient is originally from [place], [place].  His mother and father  when this patient was in the 4th grade and his father remarried.  He stated that he has a fraternal twin brother who is attending school at [employer], but is doing some classes virtually.  He stated that his mother is a  and his father is a  for [employer].  He claims that his maternal grandmother has a history of obsessive-compulsive disorder.  When asked about his mother, he noted \"she is super loving, caring and supportive.\"  In fact, he said the same thing about his father.      This patient graduated from [employer] in 2019 and stated that he received straight A's,  yet he claims \"I think I was using resources to get the grades like the Internet\" and now does not feel that he deserves the grades he received.  He did play intramural basketball, ultimate Frisbee and ran track.  During his leisure time, he likes to spend it with friends, watch movies and watch television.  He was able to name his roommate, a friend from high school and another friend as individuals in whom he can confide.      MENTAL STATUS:  This patient came to the evaluation setting dressed casually, although appropriately.  He was seen through [facility] Tele[facility] platform due to COVID-19 restrictions.  He was generally cooperative but did not appear to be tracking particularly well.  There seemed to be some evidence of thought blocking.  In the recent past there was some evidence of poor hygiene and attendance to ADLs.  His affect seemed to be anxious and paranoid and it was unclear if his mood was consistent with his affect.  He seemed to have difficulty responding to this clinician's questions at times.  There seems to be some evidence of obsession, but no evidence of compulsions.  He did endorse suicidal ideation prior to coming in the hospital, but no evidence of homicidal ideation.  He denies hallucinations, " but there may be some evidence of delusions as well as paranoid ideation.  As a result, one cannot rule out the possibility of psychotic processing.  He was oriented to person, place and time.      TESTS ADMINISTERED AND TASKS COMPLETED:  Diagnostic interview, review of medical records, [place] Multiphasic Personality Inventory-2 (MMPI-2), [facility] Clinical Multiaxial Inventory-III (MCMI-III), Rorschach Test, Doherty Depression Inventory-II (BDI-II), Doherty Anxiety Inventory (MU).      TEST RESULTS:  It should be noted that this patient did go through psychological evaluation on 12/05/2019 with Naomi Granados PsyD, ABPP, ZOFIA, LP.  At that time she diagnosed him with adjustment disorder with anxiety.  He was also ruminating about his school decision even at that time.  He also went through other testing in 02/2020 and was administered the Wechsler Adult Intelligence Scale-4th Edition (WAIS-IV), the [place] Multiphasic Personality Inventory-2 restructured form (MMPI-2-RF) and the Doherty Anxiety Inventory (MU).  The report could be seen in his chart.  He did show intellectual functioning in the high average to superior range.  He also seemed to show evidence of mild anxiety and has difficulty coping with the various stressors in his life.      This patient skipped so many items on the most recent MMPI-2 that the profile is neither valid nor interpretable.  He evidently ruminated about how to answer the question and was concerned about how he would be perceived by this clinician and others.        The MCMI-III was responded to in an inconsistent manner to the point where the profile may have questionable validity.  These individuals tend to have some difficulty understanding some of the items or were confused.  He also omitted a number of items.  Otherwise, interpretation will need to be done with caution.  These individuals manifest depressive and anxiety symptoms.  They believe they have a history of trauma.  They are seen  "as emotionally dysregulated.  They usually are somewhat dependent upon others.  They seem to have some somatic based complaints and worry about their health.  They struggle in certain social situations and may feel disconnected from others.  They believe that their mood is erratic.  They may feel they have a poor sense of self and feel \"empty inside.\"      The Rorschach test was attempted to be administered, but he had too much difficulty being able to endorse enough responses to deem it a valid and interpretable protocol.  In fact, he appeared quite paranoid during the process.  He also had not completed the BDI-2 or the MU as of this report.      SUMMARY OF CURRENT FINDINGS:  This is a 20-year-old single male who was admitted to the Young Adult Inpatient Mental Health Unit at [facility], due to concerns related to increased intensity of depressive and anxiety symptoms as well as suicidality and possible psychotic processing.  He stated that he was brought to the hospital because he was making \"suicidal threats.\"  Yet he seemed to minimize and claimed that he made these statements because he was upset.  He admitted, \"it was just weeks of just not getting anything done.  How I went about college and a lot of things in life.  It's just relationships and I have not been doing my work.\"  This statement was indicative of much of the interview, as he had difficulty tracking, would ruminate and had difficulty responding appropriately to this clinician's questions.  He has shown evidence of paranoid ideation and has made bizarre statements, for example, about the television in the Emergency Department possibly sending messages.  He told his family that he had invested all of his money in Bitcoin which this patient now denies to this clinician.  He has made statements to his mother about being concerned that he would be sent to [facility], that he has \"lied his whole life\" and that he has " "used the word \"counterfeit\" leading his mother to believe that he has engaged in illegal activities. Yet this patient denied any of these statements to this clinician.  He denied any auditory or visual hallucinations.  He has shown some evidence of confusion and thought blocking while on the unit, but this evidently has improved over the course of his hospital stay.  He has used cannabis up to a daily basis, the last of which was in November, but called it a \"social thing.\"  He also tried acid 3 times \"a long time ago\" and Adderall twice to help with studying.  He has stolen food from [employer], but documentation states that he may have stolen various items and alcohol from his friend and friend's parents.  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      Psychological testing was limited in that he omitted too many items on the MMPI-2.  He also did not endorse enough responses on the Rorschach Test to deem it a valid and interpretable protocol.  This clinician still has not received BDI-2 or MU.  The MCMI-III suggested some level of distress manifested by depressive and anxiety symptoms.  He seems to be somewhat disconnected from others and has a poor sense of self.  There may be some evidence of emotional dysregulation.  He does appear to be somewhat distrustful of others.  Any further interpretation would need to be done with caution.      Overall, I have concerns about this patient's level of emotional distress, rumination about school, thought blocking and paranoid ideation.  Previous psychological testing did suggest that he was ruminating about his decision-making in school even over a year ago.  Yet this has seemingly become more obsessive and paranoid in nature.  He shows some hallmarks of first episode psychotic disorder which will need to be investigated more fully " within a comprehensive program.  He claims to have been sober from cannabis for over 2 months and has not used Adderall or acid for a long period before that.  Thus, it is unlikely that his current state of functioning is the result of substance use.  He does appear to be at a moderate risk for continued suicidality and substance use based on his level of impulsivity and history.      DIAGNOSTIC IMPRESSION:   PRINCIPAL DIAGNOSES:  F29, unspecified psychotic disorder, cannabis use disorder, moderate, in recent sustained remission.  Rule out F41.9, unspecified anxiety disorder with obsessive features.      TREATMENT RECOMMENDATIONS:   1.  This patient would benefit from First Episode Psychosis Clinic to more fully assess and treat psychotic disorder.   2.  Day treatment will be helpful to promote mood stability and more fully monitor and treat his psychotic symptoms.   3.  Reality based individual psychotherapy will be necessary to focus on improved coping mechanisms.   4.  Family psychotherapy will be necessary to focus on improved communication within the family dynamic and set appropriate boundaries.   5.  Random urine drug screens will be necessary to ensure sobriety.   6.  Medication management may be helpful to promote mood stability and decrease psychotic symptomatology.   7.  This patient should be encouraged to find alternative activities in which to engage so he may feel more appropriately empowered.   8.  This clinician will continue to consult with this patient, this patient's family and Debra Naegele if necessary.         PHILLIP MAN, PHD, LP             D: 2021   T: 2021   MT: MOSES      Name:     TARYN TOLEDO   MRN:      [id number]        Account:       [id number]   :      2000           Consult Date:  2021      Document: [id number]       cc: Debra Naegele APRN, CNS

## 2021-01-22 NOTE — PROGRESS NOTES
Pt was escorted downstairs for  MRI at 1620.  Pt went with staff and security.  Pt was gone for 15 minutes.  Staff stated  they did not encounter any problems.  Pt returned to the unit without any questions or complaints. VSS

## 2021-01-22 NOTE — PROGRESS NOTES
[facility], [place]   Psychiatric Progress Note        Interim History:   The patient's care was discussed with the treatment team during the daily team meeting and/or staff's chart notes were reviewed.  Staff report patient is visible in the milieu and attends groups. Patient continues to say he is struggling.     Psychiatric symptoms and interventions:   Upon interview with patient, he reports that he is struggling in groups. He is concerned that he is not at the same cognitive level as when he was in college. Patient displayed latent speech. He did not finish his sentences. He reports that he has a hard time expressing his thoughts.     Patient has been cooperative with his medications.     Provider offered to discuss Dr. Mike's evaluation. Patient declined. Provider went over the results with mother. Explained medications, told her about referral to Navigate, and first episode work up.            Medications:       adalimumab  40 mg Subcutaneous Q7 Days     azaTHIOprine  100 mg Oral Daily     lactobacillus rhamnosus (GG)  1 capsule Oral BID     LORazepam  1 mg Oral Once     melatonin  10 mg Oral At Bedtime     methylfolate  7.5 mg Oral Daily     OLANZapine zydis  10 mg Oral At Bedtime          Allergies:     Allergies   Allergen Reactions     Nuts      All nuts           Labs:   No results found for this or any previous visit (from the past 24 hour(s)).       Psychiatric Examination:     /70 (BP Location: Left arm)   Pulse 90   Temp 97.5  F (36.4  C) (Oral)   Resp 16   Wt 74.4 kg (164 lb)   SpO2 97%   BMI 20.50 kg/m    Weight is 164 lbs 0 oz  Body mass index is 20.5 kg/m .  Orthostatic Vitals       Most Recent      Sitting Orthostatic /70 01/22 0900    Sitting Orthostatic Pulse (bpm) 90 01/22 0900    Standing Orthostatic BP 97/56 01/22 0900    Standing Orthostatic Pulse (bpm) 98 01/22 0900          Appearance: awake, alert and adequately  groomed  Attitude:  evasive and guarded  Eye Contact:  fair  Mood:  anxious and depressed  Affect:  intensity is flat  Speech:  paucity of speech, thought blocking  Psychomotor Behavior:  no evidence of tardive dyskinesia, dystonia, or tics  Throught Process:  goal oriented  Associations:   loosing of  associations  Thought Content:  passive suicidal ideation present  Insight:  limited  Judgement:  limited  Oriented to:  time, person, and place  Attention Span and Concentration:  fair  Recent and Remote Memory:  intact  Clinical Global Impressions  First:     Most recent:            Precautions:     Behavioral Orders   Procedures     Code 1 - Restrict to Unit     Code 2     [place]     MMPI     Routine Programming     As clinically indicated     Single Room     Psychotic and parnaoid     Status 15     Every 15 minutes.          DIagnoses:   1. Unspecified psychosis    2.  Rule out substance induced psychosis  3.  Major depressive disorder, recurrent, severe with psychosis.   4.  Cannabis use disorder, severe.   5.  Nicotine dependence.   6.  Crohn's disease.        Plan:   Legal status: Voluntary      Medication management:   Zyprexa 10 mg      Medical: The patient has a history of Crohn's disease. Reviewed admission labs: unremarkable, COVID screen is negative.     Behavioral/psychological/social:  Encouraged patient to attend therapeutic  hospital programming as tolerated.  Dr. Mike completed psychological testing, pls see consult note.      Disposition plan:   Reason for continued hospitalization : Patient is at risk for harming self.   Patient will discharge on Saturday, Meds ordered.  Return to home with First Episode Program     Video-Visit Details     Type of service:  Video Visit     Video Start Time (time video started): 1211     Video End Time (time video stopped): 1220    Originating Location (pt. Location): Other Station [facility]     Distant Location (provider location): Provider remote location     Mode of  Communication:  Video Conference via [employer]     Physician has received verbal consent for a Video Visit from the patient? Yes

## 2021-01-22 NOTE — PLAN OF CARE
"Problem: OT General Care Plan  Goal: OT Goal 1  Description: Within 1 week, Pt will demonstrate increased openness as evidenced by sharing >2 thoughts and/or feelings on mental health or substance use.   Outcome: Improving     Pt actively participated in a group task involving baking healthy snickerdoodle cookies. Purpose of structured group: provide socialization, promote sensory integration, encourage patience and concentration, and develop independent living skills. Pt collaborated with peers and writer to accurately follow step-by-step instructions and followed sanitation/hygiene guidelines provided by writer. Pt was engaged for full duration of group, and appeared to brighten slightly throughout the social/functional task. Pt is increasingly more social with his peers and is observed to be more at ease during these interactions. During the discussion and educational component of group, pt shared that cooking and baking reminds him of his roommates because his roommates used to do cook for each other occasion. Pt did well sharing tasks with his peers, occasionally appearing more animated when making light of his cooking skills stating he was \"not a pro cook\".     Pt actively participated in occupational therapy clinic. During initial check in, pt reports \"feeling tired and worried about the future\". Pt resumed working on familiar brain-stimulating worksheets. Pt was increasingly more social today, sharing memories about a Lumineers song that came on through the ipad.     After group writer provided pt a packet about self-compassion, encouraging him to read it on his own time. Pt receptive this this.   "

## 2021-01-23 LAB
BASOPHILS # BLD AUTO: 0.1 10E9/L (ref 0–0.2)
BASOPHILS NFR BLD AUTO: 0.9 %
DIFFERENTIAL METHOD BLD: NORMAL
EOSINOPHIL # BLD AUTO: 0.4 10E9/L (ref 0–0.7)
EOSINOPHIL NFR BLD AUTO: 5.6 %
ERYTHROCYTE [DISTWIDTH] IN BLOOD BY AUTOMATED COUNT: 12.2 % (ref 10–15)
HCT VFR BLD AUTO: 46.9 % (ref 40–53)
HGB BLD-MCNC: 15.6 G/DL (ref 13.3–17.7)
IMM GRANULOCYTES # BLD: 0 10E9/L (ref 0–0.4)
IMM GRANULOCYTES NFR BLD: 0.6 %
LYMPHOCYTES # BLD AUTO: 2.3 10E9/L (ref 0.8–5.3)
LYMPHOCYTES NFR BLD AUTO: 33.3 %
MCH RBC QN AUTO: 32.4 PG (ref 26.5–33)
MCHC RBC AUTO-ENTMCNC: 33.3 G/DL (ref 31.5–36.5)
MCV RBC AUTO: 98 FL (ref 78–100)
MONOCYTES # BLD AUTO: 0.8 10E9/L (ref 0–1.3)
MONOCYTES NFR BLD AUTO: 10.9 %
NEUTROPHILS # BLD AUTO: 3.4 10E9/L (ref 1.6–8.3)
NEUTROPHILS NFR BLD AUTO: 48.7 %
NRBC # BLD AUTO: 0 10*3/UL
NRBC BLD AUTO-RTO: 0 /100
PLATELET # BLD AUTO: 210 10E9/L (ref 150–450)
RBC # BLD AUTO: 4.81 10E12/L (ref 4.4–5.9)
VIT B12 SERPL-MCNC: 549 PG/ML (ref 193–986)
WBC # BLD AUTO: 6.9 10E9/L (ref 4–11)

## 2021-01-23 PROCEDURE — 36415 COLL VENOUS BLD VENIPUNCTURE: CPT | Performed by: CLINICAL NURSE SPECIALIST

## 2021-01-23 PROCEDURE — 82390 ASSAY OF CERULOPLASMIN: CPT | Performed by: CLINICAL NURSE SPECIALIST

## 2021-01-23 PROCEDURE — 99239 HOSP IP/OBS DSCHRG MGMT >30: CPT | Mod: 95 | Performed by: CLINICAL NURSE SPECIALIST

## 2021-01-23 PROCEDURE — 250N000012 HC RX MED GY IP 250 OP 636 PS 637: Performed by: CLINICAL NURSE SPECIALIST

## 2021-01-23 PROCEDURE — 86617 LYME DISEASE ANTIBODY: CPT | Performed by: CLINICAL NURSE SPECIALIST

## 2021-01-23 PROCEDURE — 85025 COMPLETE CBC W/AUTO DIFF WBC: CPT | Performed by: CLINICAL NURSE SPECIALIST

## 2021-01-23 PROCEDURE — 82607 VITAMIN B-12: CPT | Performed by: CLINICAL NURSE SPECIALIST

## 2021-01-23 PROCEDURE — 87389 HIV-1 AG W/HIV-1&-2 AB AG IA: CPT | Performed by: CLINICAL NURSE SPECIALIST

## 2021-01-23 PROCEDURE — 250N000013 HC RX MED GY IP 250 OP 250 PS 637: Performed by: CLINICAL NURSE SPECIALIST

## 2021-01-23 RX ORDER — OLANZAPINE 10 MG/1
10 TABLET, ORALLY DISINTEGRATING ORAL AT BEDTIME
Qty: 30 TABLET | Refills: 1 | Status: SHIPPED | OUTPATIENT
Start: 2021-01-23 | End: 2021-01-23

## 2021-01-23 RX ORDER — OLANZAPINE 5 MG/1
5 TABLET ORAL AT BEDTIME
Status: DISCONTINUED | OUTPATIENT
Start: 2021-01-23 | End: 2021-01-23

## 2021-01-23 RX ORDER — OLANZAPINE 10 MG/1
10 TABLET ORAL AT BEDTIME
Qty: 30 TABLET | Refills: 1 | Status: ON HOLD | OUTPATIENT
Start: 2021-01-23 | End: 2021-02-03

## 2021-01-23 RX ORDER — LEVOMEFOLATE CALCIUM 7.5 MG TABLET
7.5 TABLET DAILY
COMMUNITY

## 2021-01-23 RX ORDER — OLANZAPINE 10 MG/1
10 TABLET ORAL AT BEDTIME
Status: DISCONTINUED | OUTPATIENT
Start: 2021-01-23 | End: 2021-01-23 | Stop reason: HOSPADM

## 2021-01-23 RX ADMIN — AZATHIOPRINE 100 MG: 50 TABLET ORAL at 10:26

## 2021-01-23 RX ADMIN — Medication 1 CAPSULE: at 10:26

## 2021-01-23 RX ADMIN — LEVOMEFOLATE CALCIUM 7.5 MG TABLET 7.5 MG: TABLET at 10:26

## 2021-01-23 ASSESSMENT — ACTIVITIES OF DAILY LIVING (ADL): HYGIENE/GROOMING: INDEPENDENT

## 2021-01-23 NOTE — PLAN OF CARE
"Shift update: Herminio would really like to be discharged today. He feels disappointed in himself that he can not restart classes this term. He's quite hard on himself and blames himself for where he's at and for all the choices he has made in his life so far. \"I did some bad things and I don't have an excuse on why I did them. I had a good, supportive family growing up.\" Expresses lots of shame and guilt and although he wants to get his life back on track, he doesn't see a way to do this. He talks about switching schools vs taking some time to \"figure it all out\".     Mood/Affect: Expresses depression, guilt, sadness, hopelessness and anxiety. Good eye contact, clear and linear thinking. Logical and future oriented. Did not  on paranoia this morning. Answers questions appropriately. Normal rate/rhythm and volume.     SI: Denies    Hallucinations/Psychosis: Denies    Activity/Participation: Active in milieu this morning  PRN Meds: none  Appetite: good    Medical: Crohn's. No issues/complains/pain.     Plan: Discuss discharge planning with provider later today       "

## 2021-01-23 NOTE — PROGRESS NOTES
01/23/21 0600   Sleep/Rest/Relaxation   Sleep/Rest/Relaxation (WDL) WDL   Sleep/Rest/Relaxation appears asleep   Night Time # Hours 7 hours   Focus: Shift summary    Data: Patient slept 7 hours last night. No interventions needed. Respirations even and unlabored on status 15 checks. Will continue to monitor and report to oncoming staff.    Response: Report sleep hours to day shift. Continue to monitor patient and provide therapeutic interventions as necessary.

## 2021-01-23 NOTE — PLAN OF CARE
Discharge meds and education given.  Question answered  F/U plan discussed and acknowledged.   Discharged at 1305

## 2021-01-23 NOTE — PLAN OF CARE
"  Problem: Psychotic Symptoms  Goal: Psychotic Symptoms  Description: Signs and symptoms of listed problems will be absent or manageable.  Outcome: Improving  Flowsheets (Taken 1/22/2021 1916)  Psychotic Symptoms Assessed: all  Psychotic Symptoms Present:   mood   anxiety   thought process   speech   Pt has socially isolative and withdrawn.  Pt sat out in the lounge, watched a movie but did not interact with peers.  Pt reports feeling \"better\" and happy he is being discharged tomorrow.  Pt states he is depressed/anxious but is unable to rate it \"how does one rate stuff like that\".  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  Pt admits comp science is \"too hard and stressful\" for him and felt it contributed to his \"mental breakdown\".  Denies SI.  No medication side effect noted.  States appetite and sleep are \"ok\".  VSS.  To monitor and offer support as needed,  "

## 2021-01-24 LAB
B BURGDOR IGG SER QL IB: NEGATIVE
HIV 1+2 AB+HIV1 P24 AG SERPL QL IA: NONREACTIVE

## 2021-01-25 ENCOUNTER — HOSPITAL ENCOUNTER (INPATIENT)
Facility: CLINIC | Age: 21
LOS: 11 days | Discharge: HOME | End: 2021-02-05
Attending: FAMILY MEDICINE | Admitting: PSYCHIATRY & NEUROLOGY
Payer: COMMERCIAL

## 2021-01-25 ENCOUNTER — TELEPHONE (OUTPATIENT)
Dept: BEHAVIORAL HEALTH | Facility: CLINIC | Age: 21
End: 2021-01-25

## 2021-01-25 DIAGNOSIS — F41.1 GENERALIZED ANXIETY DISORDER: ICD-10-CM

## 2021-01-25 DIAGNOSIS — T88.7XXA MEDICATION SIDE EFFECTS: ICD-10-CM

## 2021-01-25 DIAGNOSIS — F51.05 INSOMNIA DUE TO OTHER MENTAL DISORDER: Primary | ICD-10-CM

## 2021-01-25 DIAGNOSIS — F99 INSOMNIA DUE TO OTHER MENTAL DISORDER: Primary | ICD-10-CM

## 2021-01-25 DIAGNOSIS — F29 PSYCHOSIS, UNSPECIFIED PSYCHOSIS TYPE (H): ICD-10-CM

## 2021-01-25 DIAGNOSIS — F33.3 MAJOR DEPRESSIVE DISORDER, RECURRENT, SEVERE WITH PSYCHOTIC FEATURES (H): ICD-10-CM

## 2021-01-25 DIAGNOSIS — R45.851 SUICIDAL IDEATION: ICD-10-CM

## 2021-01-25 DIAGNOSIS — Z11.52 ENCOUNTER FOR SCREENING LABORATORY TESTING FOR COVID-19 VIRUS: ICD-10-CM

## 2021-01-25 LAB
AMPHETAMINES UR QL SCN: NEGATIVE
BARBITURATES UR QL: NEGATIVE
BENZODIAZ UR QL: NEGATIVE
CANNABINOIDS UR QL SCN: NEGATIVE
CERULOPLASMIN SERPL-MCNC: 14 MG/DL (ref 20–60)
COCAINE UR QL: NEGATIVE
ETHANOL UR QL SCN: NEGATIVE
LABORATORY COMMENT REPORT: NORMAL
OPIATES UR QL SCN: NEGATIVE
SARS-COV-2 RNA RESP QL NAA+PROBE: NEGATIVE
SPECIMEN SOURCE: NORMAL

## 2021-01-25 PROCEDURE — 90791 PSYCH DIAGNOSTIC EVALUATION: CPT | Performed by: PSYCHIATRY & NEUROLOGY

## 2021-01-25 PROCEDURE — C9803 HOPD COVID-19 SPEC COLLECT: HCPCS | Performed by: FAMILY MEDICINE

## 2021-01-25 PROCEDURE — 96374 THER/PROPH/DIAG INJ IV PUSH: CPT | Performed by: FAMILY MEDICINE

## 2021-01-25 PROCEDURE — 99285 EMERGENCY DEPT VISIT HI MDM: CPT | Performed by: FAMILY MEDICINE

## 2021-01-25 PROCEDURE — 124N000002 HC R&B MH UMMC

## 2021-01-25 PROCEDURE — 96361 HYDRATE IV INFUSION ADD-ON: CPT | Performed by: FAMILY MEDICINE

## 2021-01-25 PROCEDURE — U0003 INFECTIOUS AGENT DETECTION BY NUCLEIC ACID (DNA OR RNA); SEVERE ACUTE RESPIRATORY SYNDROME CORONAVIRUS 2 (SARS-COV-2) (CORONAVIRUS DISEASE [COVID-19]), AMPLIFIED PROBE TECHNIQUE, MAKING USE OF HIGH THROUGHPUT TECHNOLOGIES AS DESCRIBED BY CMS-2020-01-R: HCPCS | Performed by: FAMILY MEDICINE

## 2021-01-25 PROCEDURE — 99285 EMERGENCY DEPT VISIT HI MDM: CPT | Mod: 25 | Performed by: FAMILY MEDICINE

## 2021-01-25 PROCEDURE — 80307 DRUG TEST PRSMV CHEM ANLYZR: CPT | Performed by: FAMILY MEDICINE

## 2021-01-25 PROCEDURE — 96375 TX/PRO/DX INJ NEW DRUG ADDON: CPT | Performed by: FAMILY MEDICINE

## 2021-01-25 PROCEDURE — 250N000012 HC RX MED GY IP 250 OP 636 PS 637: Performed by: FAMILY MEDICINE

## 2021-01-25 PROCEDURE — U0005 INFEC AGEN DETEC AMPLI PROBE: HCPCS | Performed by: FAMILY MEDICINE

## 2021-01-25 PROCEDURE — 80320 DRUG SCREEN QUANTALCOHOLS: CPT | Performed by: FAMILY MEDICINE

## 2021-01-25 PROCEDURE — 250N000013 HC RX MED GY IP 250 OP 250 PS 637: Performed by: PSYCHIATRY & NEUROLOGY

## 2021-01-25 RX ORDER — AMOXICILLIN 250 MG
1 CAPSULE ORAL 2 TIMES DAILY PRN
Status: DISCONTINUED | OUTPATIENT
Start: 2021-01-25 | End: 2021-02-05 | Stop reason: HOSPADM

## 2021-01-25 RX ORDER — LACTOBACILLUS RHAMNOSUS GG 10B CELL
1 CAPSULE ORAL 2 TIMES DAILY
Status: DISCONTINUED | OUTPATIENT
Start: 2021-01-26 | End: 2021-02-05 | Stop reason: HOSPADM

## 2021-01-25 RX ORDER — OLANZAPINE 10 MG/2ML
10 INJECTION, POWDER, FOR SOLUTION INTRAMUSCULAR 3 TIMES DAILY PRN
Status: DISCONTINUED | OUTPATIENT
Start: 2021-01-25 | End: 2021-01-28

## 2021-01-25 RX ORDER — MAGNESIUM HYDROXIDE/ALUMINUM HYDROXICE/SIMETHICONE 120; 1200; 1200 MG/30ML; MG/30ML; MG/30ML
30 SUSPENSION ORAL EVERY 4 HOURS PRN
Status: DISCONTINUED | OUTPATIENT
Start: 2021-01-25 | End: 2021-02-05 | Stop reason: HOSPADM

## 2021-01-25 RX ORDER — HYDROXYZINE HYDROCHLORIDE 25 MG/1
25 TABLET, FILM COATED ORAL EVERY 4 HOURS PRN
Status: DISCONTINUED | OUTPATIENT
Start: 2021-01-25 | End: 2021-01-28

## 2021-01-25 RX ORDER — AZATHIOPRINE 50 MG/1
100 TABLET ORAL DAILY
Status: DISCONTINUED | OUTPATIENT
Start: 2021-01-25 | End: 2021-02-05 | Stop reason: HOSPADM

## 2021-01-25 RX ORDER — PHENOL 1.4 %
10 AEROSOL, SPRAY (ML) MUCOUS MEMBRANE
Status: ON HOLD | COMMUNITY
End: 2021-02-03

## 2021-01-25 RX ORDER — LACTOBACILLUS RHAMNOSUS GG 10B CELL
1 CAPSULE ORAL 2 TIMES DAILY
COMMUNITY

## 2021-01-25 RX ORDER — OLANZAPINE 10 MG/1
10 TABLET ORAL 3 TIMES DAILY PRN
Status: DISCONTINUED | OUTPATIENT
Start: 2021-01-25 | End: 2021-01-28

## 2021-01-25 RX ORDER — TRAZODONE HYDROCHLORIDE 50 MG/1
50 TABLET, FILM COATED ORAL
Status: DISCONTINUED | OUTPATIENT
Start: 2021-01-25 | End: 2021-01-28

## 2021-01-25 RX ORDER — OLANZAPINE 10 MG/1
10 TABLET ORAL AT BEDTIME
Status: DISCONTINUED | OUTPATIENT
Start: 2021-01-25 | End: 2021-01-28

## 2021-01-25 RX ORDER — LEVOMEFOLATE CALCIUM 7.5 MG TABLET
7.5 TABLET DAILY
Status: DISCONTINUED | OUTPATIENT
Start: 2021-01-26 | End: 2021-02-05 | Stop reason: HOSPADM

## 2021-01-25 RX ORDER — ACETAMINOPHEN 325 MG/1
650 TABLET ORAL EVERY 4 HOURS PRN
Status: DISCONTINUED | OUTPATIENT
Start: 2021-01-25 | End: 2021-02-05 | Stop reason: HOSPADM

## 2021-01-25 RX ADMIN — AZATHIOPRINE 100 MG: 50 TABLET ORAL at 19:34

## 2021-01-25 RX ADMIN — Medication 10 MG: at 22:02

## 2021-01-25 RX ADMIN — OLANZAPINE 10 MG: 10 TABLET, FILM COATED ORAL at 22:02

## 2021-01-25 ASSESSMENT — ACTIVITIES OF DAILY LIVING (ADL)
DRESS: INDEPENDENT
HYGIENE/GROOMING: INDEPENDENT
ORAL_HYGIENE: INDEPENDENT
LAUNDRY: WITH SUPERVISION

## 2021-01-25 ASSESSMENT — MIFFLIN-ST. JEOR: SCORE: 1925.71

## 2021-01-25 NOTE — TELEPHONE ENCOUNTER
"S:   3:15 PM  Call from DEC  in [facility] requesting IP MH placement for a 21 YO M    B:  Hx:  MDD, MARTA and unspecified psychosis.  Discharged from  IP MH 2 days ago.  Patient woke up today agitated, , started \"amping up\"   \"I'm going to end it\".  Mom was going to call [facility], but then patient tried leaving  Home.  Police called and they brought patient to ED for evaluation.  Patient reports  zyprexa not helping, trouble sleeping, overwhelmed, patient guarded, having internal dialogue, no CD use currently-hx of cannabinoid use    Medical-[facility]     A:  voluntary    R:  Patient cleared and ready for behavioral bed placement: Yes      "

## 2021-01-25 NOTE — ED NOTES
Pt searched- belongings on rack. Pt seems anxious, but cooperative with process. Pt changed into scrubs due to strings in clothing. Pt given two turkey sandwiches, milk, and patient phone to call his mom.

## 2021-01-25 NOTE — ED PROVIDER NOTES
"    [facility] EMERGENCY DEPARTMENT ([facility])     January 25, 2021    History     Chief Complaint   Patient presents with     Suicidal     making SI statements to family, 911 called, on transport hold     HPI  Julian Berg is a 20 year old male with a past medical history significant for depression, unspecified psychotic disorder, MARTA, and substance abuse who presents here to the Emergency Department due to suicidal ideation. Patient was recently admitted 01/14-01/23 for possible psychosis and suicidal ideation.  He had an extensive medical work-up including MRI and labs which was unremarkable.  At discharge he was referred to the first episode psychosis Navigate program.  Was discharged with Zyprexa but states he does not believe the medication is particularly helpful and \"slows my thoughts\".  Last night he slept poorly.  This morning he was feeling very hopeless, was pacing in the house, making multiple statements about how he could not continue living like this and stating that he wanted to die.  [place] was called to the home and eventually 911.  Here he admits to feeling helpless and hopeless and frustrated.  Denies having an active suicide plan.    PAST MEDICAL HISTORY:   Past Medical History:   Diagnosis Date     Colitis        PAST SURGICAL HISTORY:   Past Surgical History:   Procedure Laterality Date     GENITOURINARY SURGERY         Past medical history, past surgical history, medications, and allergies were reviewed with the patient. Additional pertinent items: None    FAMILY HISTORY: History reviewed. No pertinent family history.    SOCIAL HISTORY:   Social History     Tobacco Use     Smoking status: Passive Smoke Exposure - Never Smoker     Smokeless tobacco: Former User   Substance Use Topics     Alcohol use: Yes     Comment: occasional     Social history was reviewed with the patient. Additional pertinent items: None      Patient's Medications   New Prescriptions    No medications on file "   Previous Medications    ADALIMUMAB (HUMIRA) 40 MG/0.8ML PREFILLED SYRINGE KIT    Inject 40 mg Subcutaneous Every Monday    AZATHIOPRINE 100 MG TABS    Take 100 mg by mouth daily     MELATONIN 1 MG TABS TABLET    Take 10 mg by mouth nightly as needed for sleep    METHYLFOLATE (DEPLIN) 7.5 MG TABS TABLET    Take 7.5 mg by mouth daily    OLANZAPINE (ZYPREXA) 10 MG TABLET    Take 1 tablet (10 mg) by mouth At Bedtime   Modified Medications    No medications on file   Discontinued Medications    No medications on file          Allergies   Allergen Reactions     Nuts      All nuts         Review of Systems  A complete review of systems was performed with pertinent positives and negatives noted in the HPI, and all other systems negative.    Physical Exam   BP: 124/77  Pulse: 86  Temp: 95.6  F (35.3  C)  Resp: 12  SpO2: 97 %      Physical Exam  Constitutional:       General: He is not in acute distress.     Appearance: He is not diaphoretic.   HENT:      Head: Atraumatic.   Eyes:      General: No scleral icterus.     Pupils: Pupils are equal, round, and reactive to light.   Cardiovascular:      Heart sounds: Normal heart sounds.   Pulmonary:      Effort: No respiratory distress.      Breath sounds: Normal breath sounds.   Abdominal:      General: Bowel sounds are normal.      Palpations: Abdomen is soft.      Tenderness: There is no abdominal tenderness.   Musculoskeletal:         General: No tenderness.   Skin:     General: Skin is warm.      Findings: No rash.   Neurological:      General: No focal deficit present.      Mental Status: He is oriented to person, place, and time.   Psychiatric:         Attention and Perception: He is inattentive.         Mood and Affect: Mood is depressed. Affect is flat.         Speech: Speech is delayed.         Behavior: Behavior is withdrawn.         Thought Content: Thought content is paranoid. Thought content includes suicidal ideation.         Cognition and Memory: Cognition normal.  "     Comments: Patient frequently causes in mid sentence, he speaks in a fully oriented fashion but it appears that he needs to concentrate to complete his sentences and his thoughts.         ED Course        Procedures                           Results for orders placed or performed during the hospital encounter of 01/25/21 (from the past 24 hour(s))   Drug abuse screen 6 urine (tox)   Result Value Ref Range    Amphetamine Qual Urine Negative NEG^Negative    Barbiturates Qual Urine Negative NEG^Negative    Benzodiazepine Qual Urine Negative NEG^Negative    Cannabinoids Qual Urine Negative NEG^Negative    Cocaine Qual Urine Negative NEG^Negative    Ethanol Qual Urine Negative NEG^Negative    Opiates Qualitative Urine Negative NEG^Negative     Medications - No data to display          Assessments & Plan (with Medical Decision Making)   Patient who was recently discharged after a 9-day stay due to new onset psychosis.  Prior to discharge a medication change was recommended however he declined and he was discharged on Zyprexa.  Since discharge she has been very depressed, somewhat disorganized in his thinking, and expressing hopelessness and frustration.  The patient was also seen by the [facility] , please refer to their extensive note/evaluation which was reviewed with me and is documented in [facility] on 1/25/2021 for further details.  This morning he made multiple statements about no longer wanting to live.  Here he does report that he feels hopeless, frustrated and expresses remorse although he cannot describe what he is remorseful about.  He makes some bizarre statements about \"my choices\".  He does not appear substance intoxicated and per report of the family there is essentially no chance that he could have used any substances since discharge.  His urine tox screen is negative.  He has a history of Crohn's disease but is in no medical distress.  He appears to be suffering from ongoing symptoms of psychosis with " depression and suicidal thinking.  He agrees to be voluntarily admitted for further stabilization.  Patient could meet grounds for involuntary hold if necessary.    I have reviewed the nursing notes.    I have reviewed the findings, diagnosis, plan and need for follow up with the patient.    New Prescriptions    No medications on file       Final diagnoses:   Psychosis, unspecified psychosis type (H)   Suicidal ideation       1/25/2021   [facility] EMERGENCY DEPARTMENT     Nadeem Hair MD  01/25/21 3719

## 2021-01-25 NOTE — PHARMACY-ADMISSION MEDICATION HISTORY
Admission medication history interview status for the 1/25/2021 admission is complete. See Epic admission navigator for allergy information, pharmacy, prior to admission medications and immunization status.     Medication history interview sources:  patient, recently discharged from  on 1/23/21    Changes made to PTA medication list (reason)  Added: none  Deleted: none  Changed: none    Additional medication history information (including reliability of information, actions taken by pharmacist):  -Patient last took his medications yesterday.  -Patient is due for his Humira injection today. I let him know a home supply would need to be brought in for him as the hospital does not supply this med. It appears a dose was brought in last week when he was admitted.  -Patient was on Culturelle 1 capsule twice daily while admitted, he reports he was not taking this at home.    Prior to Admission medications    Medication Sig Last Dose Taking? Auth Provider   adalimumab (HUMIRA) 40 MG/0.8ML prefilled syringe kit Inject 40 mg Subcutaneous Every Monday 1/18/2021 at Unknown time Yes Reported, Patient   azaTHIOprine 100 MG TABS Take 100 mg by mouth daily  1/24/2021 at Unknown time Yes Reported, Patient   Melatonin 10 MG TABS tablet Take 10 mg by mouth nightly as needed for sleep 1/24/2021 at hs Yes Unknown, Entered By History   methylfolate (DEPLIN) 7.5 MG TABS tablet Take 7.5 mg by mouth daily 1/24/2021 at Unknown time Yes Reported, Patient   OLANZapine (ZYPREXA) 10 MG tablet Take 1 tablet (10 mg) by mouth At Bedtime 1/24/2021 at Unknown time Yes Naegele, Debra Ann, APRN CNS     Medication history completed by: Alejandra Saucedo, HughD, BCPS

## 2021-01-25 NOTE — ED TRIAGE NOTES
Pt was making SI statements to family members at his home.  Pt family called 911 to come for evaluation; pt was here 1 week ago; VSS; recently started meds

## 2021-01-25 NOTE — ED NOTES
Bed: ED10  Expected date: 1/25/21  Expected time: 12:15 PM  Means of arrival:   Comments:  Shima 1 21yo M SI on hold

## 2021-01-26 ENCOUNTER — TELEPHONE (OUTPATIENT)
Dept: BEHAVIORAL HEALTH | Facility: CLINIC | Age: 21
End: 2021-01-26

## 2021-01-26 PROCEDURE — 36415 COLL VENOUS BLD VENIPUNCTURE: CPT | Performed by: PHYSICIAN ASSISTANT

## 2021-01-26 PROCEDURE — 99223 1ST HOSP IP/OBS HIGH 75: CPT | Mod: AI | Performed by: CLINICAL NURSE SPECIALIST

## 2021-01-26 PROCEDURE — 82525 ASSAY OF COPPER: CPT | Performed by: PHYSICIAN ASSISTANT

## 2021-01-26 PROCEDURE — 99207 PR CONSULT E&M CHANGED TO INITIAL LEVEL: CPT | Performed by: PHYSICIAN ASSISTANT

## 2021-01-26 PROCEDURE — 124N000002 HC R&B MH UMMC

## 2021-01-26 PROCEDURE — 250N000012 HC RX MED GY IP 250 OP 636 PS 637: Performed by: PSYCHIATRY & NEUROLOGY

## 2021-01-26 PROCEDURE — 250N000013 HC RX MED GY IP 250 OP 250 PS 637: Performed by: PSYCHIATRY & NEUROLOGY

## 2021-01-26 PROCEDURE — G0177 OPPS/PHP; TRAIN & EDUC SERV: HCPCS

## 2021-01-26 PROCEDURE — 99221 1ST HOSP IP/OBS SF/LOW 40: CPT | Performed by: PHYSICIAN ASSISTANT

## 2021-01-26 PROCEDURE — H2032 ACTIVITY THERAPY, PER 15 MIN: HCPCS

## 2021-01-26 RX ADMIN — Medication 1 CAPSULE: at 09:06

## 2021-01-26 RX ADMIN — AZATHIOPRINE 100 MG: 50 TABLET ORAL at 09:08

## 2021-01-26 RX ADMIN — OLANZAPINE 10 MG: 10 TABLET, FILM COATED ORAL at 21:31

## 2021-01-26 RX ADMIN — Medication 10 MG: at 22:04

## 2021-01-26 RX ADMIN — LEVOMEFOLATE CALCIUM 7.5 MG TABLET 7.5 MG: TABLET at 09:06

## 2021-01-26 RX ADMIN — Medication 1 CAPSULE: at 21:31

## 2021-01-26 NOTE — PLAN OF CARE
Pt denies any SI/SIB.  Pt admits to coming her because he was making suicidal comments but states he has no plan.  Pt would like to sign and 12 hour intent to leave but states he is more likely to stay if he is transferred to  where he feels more comfortable.  Pt denies any SI/SIB.  Pt denies anxiety but feels more frustrated he is here and would like to go home. Pt states he has a good plan for discharge for OP mental health treatment and would like to continue it.  Pt states its hard to describe how he feels when asked if he is depressed. Pt contracts for safety.  Pt agrees to transfer voluntarily to .  Transferring pt at 10 am, report given to Ifrah

## 2021-01-26 NOTE — PROGRESS NOTES
01/25/21 2216   Patient Belongings   Did you bring any home meds/supplements to the hospital?  No   Patient Belongings locker   Patient Belongings Put in Hospital Secure Location (Security or Locker, etc.) clothing;shoes   Belongings Search Yes   Clothing Search Yes   Second Staff Jordy BELTRAN and Fito GAR     Locker #4   - white adidas  - grey jacket  - black sweatpants  - tan t shirt  - grey sweatpants    No phone or wallet found    2/1/21  Mom brought in 2x underwear, long sleeve tshirt and sweatpants.   Approved by RN.    ..A               Admission:  I am responsible for any personal items that are not sent to the safe or pharmacy.  [facility] is not responsible for loss, theft or damage of any property in my possession.    Signature:  _________________________________ Date: _______  Time: _____                                              Staff Signature:  ____________________________ Date: ________  Time: _____      2nd Staff person, if patient is unable/unwilling to sign:    Signature: ________________________________ Date: ________  Time: _____     Discharge:  [facility] has returned all of my personal belongings:    Signature: _________________________________ Date: ________  Time: _____                                          Staff Signature:  ____________________________ Date: ________  Time: _____

## 2021-01-26 NOTE — PLAN OF CARE
"INITIAL OT NOTE  Problem: OT General Care Plan  Goal: OT Goal 1  Description: Within 1 week, Pt will demonstrate increased self-esteem as evidenced by >2 self-reported positive affirmations.   Outcome: No Change    Pt actively participated in occupational therapy clinic. Pt presents as depressed and flat. Appearance is disheveled - ADLs potentially neglected. During initial check in, pt shares he feels \"frustrated\". Pt identified a goal today: \"go back to doing what I was pre-treatment\". Pt was able to ask for assistance as needed, and independently initiate a familiar, problem-solving task. Pt demonstrated good focus but was minimally interactive with peers/writer.      "

## 2021-01-26 NOTE — PROGRESS NOTES
"Behavioral Health  Note    Behavioral Health  Spirituality Group Note    [facility]    Name: Julian Berg YOB: 2000   MRN: [id number] Age: 20 year old      Patient attended -led group, which included discussion of spirituality, coping with illness and building resilience.    Patient attended group for 1.0 hrs.    The patient actively participated in group discussion  Herminio named appreciation for his parents and friends. A goal he stated was to be able to name what values are important to him. He said he's \"still trying to figure it out.\" We explored, as a group, how to be our unique selves in the world.      Katharine Dominguez  Chaplain Resident  Pager: [phone]   "

## 2021-01-26 NOTE — CONSULTS
[facility]   Consult Note - Hospitalist Service     Date of Admission:  1/25/2021  Consult Requested by: Debra Naegele, APRN, CNP  Reason for Consult: Low ceruloplasmin, concern for Boyd's disease    Assessment & Plan   Julian Berg is a 20 year old male admitted on 1/25/2021. He has a past medical history significant for Crohn's colitis, depression, MARTA, and substance abuse who is admitted to station 4A for suicidal ideation. He had a recent admission 01/14-01/23 for possible psychosis and suicidal ideation, but continued to feel hopeless/helpless and frustrated at home.    Low ceruloplasmin  New onset psychosis  Ceruloplasmin 14 in setting of recent new onset psychosis. LFTs, CBC, VitB12, TSH wnl. HIV, Lymes negative. Utox negative on admission. Recent MRI without acute pathology. Concern for possible Boyd's disease given psychiatric symptoms in setting of low ceruloplasmin level. No significant neuro exam findings consistent with Boyd's. DDx of low ceruloplasmin which are less likely at this time include nephrotic syndrome, protein-losing gastropathy or copper deficiency.  - 24 hour urine copper, serum copper level  - Optho consulted for evaluated for Kayser-Fleischer rings    Medicine will continue to follow for results of pending work-up and follow-up of Optho consult. Please reach out with any further questions or concerns.    Zena Mcdonald PA-C  [facility]     ______________________________________________________________________    Chief Complaint   Suicidal ideation    History is obtained from the patient and electronic health record    History of Present Illness   Julian Berg is a 20 year old male who has a past medical history significant for Crohn's colitis, depression, MARTA, and substance abuse.    In review of recent H&P from 1/15; patient reportedly put all of his money into Bitcoin including  his college funds. Noted to have some though blocking and difficulty answering questions at that time as well. Has some history of substance abuse including daily TCH via vaping, alcohol use, crushing/snorting adderall as well as using acid in high school. He was discharged to home on 1/22 and readmitted 1/25  following concerns for ongoing suicidal thoughts and ideations. He believes this is all a misunderstanding and should not be admitted currently. Writer addressed my role in assisting with ruling out other etiologies of new psychosis and he felt this was too much work-up.    He does occasionally have a tremor but is unable to provide me many details in regards to this. Attributes many of his symptoms such as difficulty with speech and tremor to psychiatric medications. Does not note any involuntary body movements.    He has a history of crohns for which he is on humira and azathioprine. His symptoms have been well-controlled. His weight has been stable. Denies blood or mucus in his stools. Only having 1-2 BMs daily. Follows outpatient with [facility] but is unable to recall providers name or when he last saw. Unsure if his Crohn's is limited to colon or if he had small bowel involvement as well.     Review of Systems   The 10 point Review of Systems is negative other than noted in the HPI or here.     Past Medical History    I have reviewed this patient's medical history and updated it with pertinent information if needed.   Past Medical History:   Diagnosis Date     Colitis        Past Surgical History   I have reviewed this patient's surgical history and updated it with pertinent information if needed.  Past Surgical History:   Procedure Laterality Date     GENITOURINARY SURGERY         Social History   I have reviewed this patient's social history and updated it with pertinent information if needed.  Social History     Tobacco Use     Smoking status: Passive Smoke Exposure - Never Smoker     Smokeless tobacco:  Former User   Substance Use Topics     Alcohol use: Yes     Comment: occasional     Drug use: No       Family History   I have reviewed this patient's family history and updated it with pertinent information if needed.   History reviewed. No pertinent family history.    Medications   I have reviewed this patient's current medications    Allergies   Allergies   Allergen Reactions     Nuts      All nuts        Physical Exam   Vital Signs: Temp: 97.2  F (36.2  C) Temp src: Oral BP: 131/72 Pulse: 89   Resp: 16 SpO2: 98 % O2 Device: None (Room air)    Weight: 183 lbs 0 oz    General Appearance: Awake, alert and oriented. No acute distress.  Eyes: Normal lids. Anicteric sclera. PERRL. EOMs intact. No nystagmus.  HEENT: Normocephalic, atraumatic. Nares patent. Oropharynx clear. Mucous membranes moist.  Respiratory: Normal work of breathing on room air. Lungs CTAB. No wheezes, rhonchi or rales.  Cardiovascular: RRR. S1, S2. No murmurs, rubs or gallops. Pedal pulses 2+. No lower extremity edema.  GI: Abdomen non-distended. Normoactive bowel sounds. Soft, non-tender throughout. No rebounding or guarding.  Lymph/Hematologic: No abnormal or excessive bruising.  Skin: Warm, dry. No jaundice. No rashes on exposed skin.  Musculoskeletal: Normal gait. Moves all extremities.  Neurologic: CN II-XII grossly intact. No focal deficits. Normal finger-to-nose. No intentional or resting tremor. No bat wing tremor. No chorea, dystonia or athetosis. Occasional slow speech or delayed responses.  Psychiatric: Flat, blunted affect. Low mood.    Data   Most Recent 3 CBC's:  Recent Labs   Lab Test 01/23/21  0644 01/15/21  0639 02/14/20  1248   WBC 6.9 8.0 3.2*   HGB 15.6 16.3 14.4   MCV 98 94 95    284 173     Most Recent 2 LFT's:  Recent Labs   Lab Test 01/15/21  0639   AST 17   ALT 19   ALKPHOS 49   BILITOTAL 1.2

## 2021-01-26 NOTE — ED NOTES
ED to Behavioral Floor Handoff    SITUATION  Julian Berg is a 20 year old male who speaks English and lives in a home with family members The patient arrived in the ED by ambulance from home with a complaint of Suicidal (making SI statements to family, 911 called, on transport hold)  .The patient's current symptoms started/worsened 2 day(s) ago and during this time the symptoms have increased.   In the ED, pt was diagnosed with   Final diagnoses:   Psychosis, unspecified psychosis type (H)   Suicidal ideation        Initial vitals were: BP: 124/77  Pulse: 86  Temp: 95.6  F (35.3  C)  Resp: 12  SpO2: 97 %   --------No  Is the patient diabetic?    If yes, last blood glucose? --     If yes, was this treated in the ED? --  --------  Is the patient inebriated (ETOH) No or Impaired on other substances? No  MSSA done? N/A  Last MSSA score: --    Were withdrawal symptoms treated? N/A  Does the patient have a seizure history? No. If yes, date of most recent seizure--  --------  Is the patient patient experiencing suicidal ideation? reports suicidal ideation with out intention or a suicidal plan    Homicidal ideation? denies current or recent homicidal ideation or behaviors.    Self-injurious behavior/urges? denies current or recent self injurious behavior or ideation.  ------  Was pt aggressive in the ED No  Was a code called No  Is the pt now cooperative? Yes  -------  Meds given in ED:   Medications   azaTHIOprine (IMURAN) tablet 100 mg (has no administration in time range)   OLANZapine (zyPREXA) tablet 10 mg (has no administration in time range)      Family present during ED course? No  Family currently present? No    BACKGROUND  Does the patient have a cognitive impairment or developmental disability? No  Allergies:   Allergies   Allergen Reactions     Nuts      All nuts    .   Social demographics are   Social History     Socioeconomic History     Marital status: Single     Spouse name: None     Number of children:  None     Years of education: None     Highest education level: None   Occupational History     None   Social Needs     Financial resource strain: None     Food insecurity     Worry: None     Inability: None     Transportation needs     Medical: None     Non-medical: None   Tobacco Use     Smoking status: Passive Smoke Exposure - Never Smoker     Smokeless tobacco: Former User   Substance and Sexual Activity     Alcohol use: Yes     Comment: occasional     Drug use: No     Sexual activity: Yes     Partners: Female   Lifestyle     Physical activity     Days per week: None     Minutes per session: None     Stress: None   Relationships     Social connections     Talks on phone: None     Gets together: None     Attends Pentec[employer]alism service: None     Active member of club or organization: None     Attends meetings of clubs or organizations: None     Relationship status: None     Intimate partner violence     Fear of current or ex partner: None     Emotionally abused: None     Physically abused: None     Forced sexual activity: None   Other Topics Concern     None   Social History Narrative     None        ASSESSMENT  Labs results   Labs Ordered and Resulted from Time of ED Arrival Up to the Time of Departure from the ED   DRUG ABUSE SCREEN 6 CHEM DEP URINE ([facility])   SARS-COV-2 (COVID-19) VIRUS RT-PCR      Imaging Studies: No results found for this or any previous visit (from the past 24 hour(s)).   Most recent vital signs /77   Pulse 86   Temp 95.6  F (35.3  C)   Resp 12   SpO2 97%    Abnormal labs/tests/findings requiring intervention:---   Pain control: pt had none  Nausea control: pt had none    RECOMMENDATION  Are any infection precautions needed (MRSA, VRE, etc.)? No If yes, what infection? --  ---  Does the patient have mobility issues? independently. If yes, what device does the pt use? ---  ---  Is patient on 72 hour hold or commitment? No If on 72 hour hold, have hold and rights been given to patient?  N/A  Are admitting orders written if after 10 p.m. ?N/A  Tasks needing to be completed:---     Shoshana Hubbard RN   [facility]-- [phone] [facility]   [phone] [facility]

## 2021-01-26 NOTE — PROGRESS NOTES
"Patient arrived  1/25/2021 to station 30N    EPIC Admitting  DX: Suicidal ideation [R45.851]  Psychosis, unspecified psychosis type (H) [F29]    Vital signs reviewed related to admission.  /72   Pulse 89   Temp 97.6  F (36.4  C)   Resp 16   Ht 1.905 m (6' 3\")   Wt 83 kg (183 lb)   SpO2 98%   BMI 22.87 kg/m  .    Patient arrived on the unit and was cooperative with the clothing search. The admission profile interview was attempted, patient after answering some questions stated that he would rather talk to the psychiatrist about certain topics. Patient denies suicidal ideation and self injurious thoughts. States he has anxiety about being on this unit. Denies auditory and visual hallucinations. Denies physical pain. Patient states that he may request to leave tomorrow. Patient feels as if he doesn't need to be here. When arriving on the unit patient asked about getting transferred to station 4A. Affect is flat and anxious.     Ate snack. Denies falls in the last 6 months.     Patient is voluntary, consent for service signed.                   "

## 2021-01-26 NOTE — H&P
"Admitted:     01/25/2021      CHIEF COMPLAINT:  Evaluation for suicidal ideation.      IDENTIFYING INFORMATION:  Julian Berg is a 20-year-old single  male presenting with a history of depression, unspecified psychosis, general anxiety disorder and substance abuse and is presenting with suicidal ideation.      HISTORY OF PRESENT ILLNESS:  Julian Berg is a 20-year-old single  male with a history of depression, unspecified psychosis, general anxiety disorder, polysubstance abuse and is presenting with suicidal ideation.  The patient was recently discharged from [facility] on 01/22.  He was discharged with Zyprexa 10 mg.  The patient had psychological testing.  The patient's discharge plan was to work with the [facility]igate Program.  When patient returned to home with mother patient made multiple paranoid statements.  Mother is reporting that patient made multiple suicide statements saying that \"he couldn't take it any longer, the way his life was.\"  The patient was feeling hopeless and helpless.  He was pacing in the house.  The patient was saying that he wanted to die.  Mother called [place] and eventually 911.  The patient's goal for this hospitalization is medication adjustment and working with the Navigate team.      PSYCHIATRIC REVIEW OF SYSTEMS:  The patient is depressed.  He is not happy with the way his life is.  The patient has poor energy, poor concentration.  He is feeling hopeless and helpless.  The patient is denying passive suicidal thoughts at this time.  He says, \"I would never do anything.\"  The patient denies homicidal ideation.  He is not endorsing any symptoms of jerri.  The patient is endorsing symptoms of psychosis.  He is making paranoid statements.  He denies auditory or visual hallucinations.  The patient is very anxious.  The patient denies any symptoms of PTSD, eating disorder or OCD.      PSYCHIATRIC HISTORY:  The patient has a history of anxiety.  He has been to the emergency " room twice for anxiety in 12/2019 and 01/2020. The patient was assessed at the [facility] of  [facility].  At that time cognitive testing and genetic testing was completed.  The patient has had medication management with Aga Dumont.  The patient has had trials of Celexa, Lexapro, Zoloft, gabapentin, trazodone, melatonin, and most recently Zyprexa.  The patient has been taking melatonin and trazodone for sleep.  It has not been effective.  The patient had psychological testing at [facility] with Dr. Wilson in 12/2019.  The patient completed psychological testing with Dr. Barrera during his last admission at [facility] in January.      PAST MEDICAL HISTORY:  The patient has a history of Crohn's disease.  He has had Crohn's disease since he has been [age] years old.  COVID screen is negative.  Reviewed first episode workup from last admission.  Abnormal lab was Ceruloplasmin 14, parameters are 20-60.  Brain MRI was negative.      ALLERGIES:  PEANUTS, TREE, NUTS, DUST AND POLLEN.      SUBSTANCE ABUSE HISTORY:  U-tox is negative.  The patient has a history of vaping THC, is a daily user of THC.  He has a history of binge drinking.  The patient states he has blacked out in the past.  The patient is unable to name the amount of times he has blacked out.  The patient has not been to detox.  No chemical dependency treatments.  The patient has abused Adderall by crushing and snorting it.  The patient has tried LSD in high school approximately 6 times.  The patient is a nicotine user.      FAMILY HISTORY:  Maternal grandmother endorsed OCD.  Parents are .  The patient denies any abuse history.  The patient has a twin brother.      SOCIAL HISTORY:  The patient currently is living at home with mother.  He graduated from the [employer].  The patient currently is on a leave of absence from the [employer].  He was accepted into the engineering program.  The patient was thinking  about transferring to [employer].  The patient denies any legal issues.      MEDICAL REVIEW OF SYSTEMS:  A complete review of systems was performed with pertinent positives and negatives noted in the HPI.  All other systems negative.      PHYSICAL EXAMINATION:   VITAL SIGNS:  Blood pressure is 124/77, pulse 86, temperature 95.6 Fahrenheit, respiration 12, SpO2 at 97%.  Reviewed documentation for physical examination completed by Nadeem Hair MD, dated 01/25/2021.  No changes are noted.      MENTAL STATUS EXAMINATION:  The patient appears his stated age.  He is dressed in scrubs.  He is cooperative with wearing his mask.  The patient was cooperative in accompanying provider to the interview room.  He was calm and cooperative throughout the interview.  Eye contact is adequate.  He did not display any psychomotor abnormalities.  Speech was latent.  The patient was very guarded with his answers.  Mood is described as depressed.  Affect blunted, congruent.  Thought process somewhat disorganized.  Associations are loosening.  Thought content displays evidence of psychosis including making paranoid statements.  He denies passive suicidal thoughts.  He denies active intent.  He denies homicidal thinking.  Insight and judgment appear to be fair.  Cognition appears intact to interviewing including orientation person, place, time and situation, use of language and fund of knowledge.  Recent and remote memory are grossly intact.  Muscle strength, tone and gait appear within normal limits upon observation.      ASSESSMENT:   1.  Unspecified psychotic disorder.   2.  Cannabis use disorder, moderate, in recent sustained remission.   3.  Unspecified anxiety disorder with obsessive features.   4.  Nicotine use disorder.      PLAN:   1.  The patient has been admitted to Behavioral Unit 4A.  He was transferred from unit 30.  The patient did not feel comfortable on that unit.  Mother and patient both requested a transfer to unit  4A.  The patient is here on a voluntary basis.   2.  Discussed medications with the patient.  At this time, continuing Zyprexa 10 mg to address paranoid ideation.  Discussed with patient, pharmacy consult.  Find a medication that will be more effective for him and will be compatible with his Crohn's medications.  Discussed risks, benefits and side effects of medication with patient.     3.  Internal Medicine consult for ceruloplasmin abnormal lab of 14.   4.  Curbside consult with Ophthalmology.  Dr. Perez did not feel it was urgent to do an eye exam while patient was in the hospital.  Dr. Perez felt that it could be done outpatient when patient was more stable.   5.  Psychosocial treatments to be addressed with CTC.  The patient wants to work with the [facility] Program.   6.  Estimated length of stay is 3-5 days.         DEBRA A. NAEGELE, APRN, CNS             D: 2021   T: 2021   MT: [place]      Name:     TARYN TOLEDO   MRN:      [id number]        Account:      [id number]   :      2000        Admitted:     2021                   Document: [id number]

## 2021-01-26 NOTE — PROGRESS NOTES
"INITIAL PSYCHOSOCIAL ASSESSMENT     I have reviewed the chart and interviewed the patient.      Presenting Problem  Per ED: Julian Berg is a 20 year old male with a past medical history significant for depression, unspecified psychotic disorder, MARTA, and substance abuse who presents here to the Emergency Department due to suicidal ideation. Patient was recently admitted 01/14-01/23 for possible psychosis and suicidal ideation.  He had an extensive medical work-up including MRI and labs which was unremarkable.  At discharge he was referred to the first episode psychosis Navigate program.  Was discharged with Zyprexa but states he does not believe the medication is particularly helpful and \"slows my thoughts\".Last night he slept poorly.  This morning he was feeling very hopeless, was pacing in the house, making multiple statements about how he could not continue living like this and stating that he wanted to die.  [place] was called to the home and eventually 911.  Here he admits to feeling helpless and hopeless and frustrated.  Denies having an active suicide plan. Nadeem Hair MD (1/25/21)     History of Mental Illness and Chemical Health History  Pt has a hx of Anxiety, depression and psychosis. Pt has a hx of multiple ed visits. Pt was recently at [facility] on station 4A from 1/14-1/23. He was medically stabilized on Zyprexa and discharged home with plan to participate in the [facility]igate Program through [facility]. Pt's intake is not until the 16th of February with Navigate.  Pt has participated in  Strengths program and [facility]'s day treatment program.      Family Description(Constellation, family psychiatric hx)  Pt was born and raised in [place]. Pt's parents are  and one parent is remarried. Pt has a twin brother and a step brother. Pt was in as relationship that ended in December. Pt does not have any children. Pt's maternal grandmother has a hx of OCD. Pt states \"i'm not sure what each person has\". " "     Significant Life Events (Trauma/Ilness/Death)  \"I have traumatic experiences\". Pt has Chron's disease.      Living Situation   w/mother     Criminal hx and Legal Issues  Denies     Ethnic/Cultural Issues  The patient does not identify any ethnic or cultural issues that impact treatment     Spiritual Orientation  Was raised Jehovah's witness- agnostic somewhat      Service History  Denies      Educational/Financial/Occupational  Some college was ft student at [employer]/unemployed      Social functioning (organization, interests)   Watching tv, video grames, playing sports     Current Health Care Providers  None Current.    From previous discharge summary on 1/22/21    Health Care Follow-up Appointments:   Navigate program Intake appointment   Date: 02/16/2021  Time: 11:00am (This will be a virtual appointment. Log into [employer]. Click on your appointment to join your meeting.)      Provider: Krystal Rodriguez  Address: [address], [place] [address]   Phone: [phone]         Social Service Assessment/Plan    Patient has been admitted for Suicidal Ideations. Patient will have psychiatric assessment and medication management by the psychiatrist. Medications will be reviewed and adjusted per MD as indicated. The treatment team will continue to assess and stabilize the patient's mental health symptoms with the use of medications and therapeutic programming. Hospital staff will provide a safe environment and a therapeutic milieu. Staff will continue to assess patient as needed. Patient will participate in unit groups and activities. Patient will receive individual and group support on the unit.  CTC will do individual inpatient treatment planning and after care planning. CTC will discuss options for increasing community supports with the patient. CTC will coordinate with outpatient providers and will place referrals to ensure appropriate follow up care is in place.  Patient would benefit from: " Medication management, First episode psychosis program or Day treatment to support current suicidal ideations and depression.

## 2021-01-26 NOTE — PLAN OF CARE
Patient was initially rather anxious-asking several times if he would see [place].  Eventually calmed and was in groups.    Continues to deny SI/SIB, and contracts for safety.  Still states he would like to go home, but seems resolved to be in the hospital.

## 2021-01-26 NOTE — PROGRESS NOTES
Mom called while this writer was discharging another patient .  Attempted to call her back but she did not answer.

## 2021-01-26 NOTE — PLAN OF CARE
BEHAVIORAL TEAM DISCUSSION    Participants: 4A Provider: Debra Naegele, APRN, CNS; 4A RN's: Queta Manriquez, RN; 4A CTC's:  Hilary Domingo (CTC).  Progress:  Continuing to Assess .  Continued Stay Criteria/Rationale: New Patient  Medical/Physical: None  Precautions:    Behavioral Orders   Procedures    Code 1 - Restrict to Unit    Discontinue 1:1 attendant for suicide risk     Order Specific Question:   I have performed an in person assessment of the patient     Answer:   Based on this assessment the patient no longer requires a one on one attendant at this point in time.     Order Specific Question:   Rationale     Answer:   Patient States able to remain safe in hospital    Routine Programming     As clinically indicated    Single Room    Status 15     Every 15 minutes.     Plan: CTC will complete psychosocial assessment. CTC will coordinate disposition and after care planning.  The following services will be provided to the patient; psychiatric assessment, medication management, therapeutic milieu, individual and group support, art therapy, and skills/OT groups.   Rationale for change in precautions or plan: No Change.

## 2021-01-26 NOTE — PROGRESS NOTES
Work Completed: Pt is transfer from 30, admitted last everning. [facility] CTC completed psychosocial assessment and team note. Pt asked CTC about discharge. He reported that he feels better and just thinks that he struggling a bit at home and would like to move forward with his previous discharge plan. He asked about the signing the 12 hour intent. CTC explained what that entails and encourage pt to talk with his provider first regarding a discharge plan/date. Pt would like to discharge asap.     Discharge plan or goal: Ongoing assessment. Pt has an intake with the [facility] program on 2/16 and would like to attend                Barriers to discharge: Symptom Management.

## 2021-01-26 NOTE — TELEPHONE ENCOUNTER
R:  8:30 AM  Call from Dr. Ellison requesting patient transfer to to 4A.  [facility] has accepted patient.  Will place patient in 4A queue.   Called and spoke to [facility] charge nurse and he said they are ready for transfer.  Call to station 30-updated charge nurse.

## 2021-01-26 NOTE — PLAN OF CARE
Problem: Suicidal Behavior  Goal: Suicidal Behavior is Absent or Managed  Outcome: No Change   Pt had a issue with sharing a room with anyone. He stated he wasn't comfortable. Pt has chron's disease and this can be quite uncomfortable for him. We decided it would be in the best interests for this pt to have a private room.   When asked he declines to discuss his SI but he contracts for safety on the unit.   Will continue to assess.

## 2021-01-27 PROCEDURE — 124N000002 HC R&B MH UMMC

## 2021-01-27 PROCEDURE — G0177 OPPS/PHP; TRAIN & EDUC SERV: HCPCS

## 2021-01-27 PROCEDURE — 250N000012 HC RX MED GY IP 250 OP 636 PS 637: Performed by: PSYCHIATRY & NEUROLOGY

## 2021-01-27 PROCEDURE — 99232 SBSQ HOSP IP/OBS MODERATE 35: CPT | Mod: 95 | Performed by: CLINICAL NURSE SPECIALIST

## 2021-01-27 PROCEDURE — 250N000013 HC RX MED GY IP 250 OP 250 PS 637: Performed by: PSYCHIATRY & NEUROLOGY

## 2021-01-27 RX ORDER — QUETIAPINE FUMARATE 100 MG/1
100 TABLET, FILM COATED ORAL
Status: DISCONTINUED | OUTPATIENT
Start: 2021-01-27 | End: 2021-01-28

## 2021-01-27 RX ADMIN — AZATHIOPRINE 100 MG: 50 TABLET ORAL at 08:31

## 2021-01-27 RX ADMIN — LEVOMEFOLATE CALCIUM 7.5 MG TABLET 7.5 MG: TABLET at 08:31

## 2021-01-27 RX ADMIN — Medication 10 MG: at 21:57

## 2021-01-27 RX ADMIN — Medication 1 CAPSULE: at 21:55

## 2021-01-27 RX ADMIN — OLANZAPINE 10 MG: 10 TABLET, FILM COATED ORAL at 21:56

## 2021-01-27 RX ADMIN — Medication 1 CAPSULE: at 08:31

## 2021-01-27 ASSESSMENT — ACTIVITIES OF DAILY LIVING (ADL): HYGIENE/GROOMING: INDEPENDENT

## 2021-01-27 NOTE — PROGRESS NOTES
INITIAL OT ASSESSMENT      01/26/21 1100   General Information   Date Initially Attended OT 01/26/21   Clinical Impression   Affect Flat   Orientation Oriented to person, place and time   Appearance and ADLs General cleanliness observed in most areas   Attention to Internal Stimuli Appears distracted/preoccupied internally   Interaction Skills Interacts appropriately with peers;Guarded   Ability to Communicate Needs Does so with prompts   Verbal Content Clear;Appropriate to topic;Selective   Ability to Maintain Boundaries Maintains appropriate physical boundaries;Maintains appropriate verbal boundaries   Participation Initiates participation   Concentration Concentrates 20-30 minutes   Ability to Concentrate With structure;Other (see comments)  (reports difficulty )   Follows and Comprehends Directions Independently follows multi-step directions   Memory Recall impaired, but retains orientation;Other (see comments)  (reports difficulty with recall)   Organization Independently organizes all tasks   Decision Making Independent   Planning and Problem Solving Indentifies problems but not alternatives;Needs assistance   Ability to Apply and Learn Concepts Comprehends concepts, but needs assist to apply   Frustrations / Stress Tolerance Independently identifies sources of frustration/stress;Easily frustrated   Level of Insight Some insight   Self Esteem Poor self esteem;Discredits self/work   Social Supports Has knowledge of support systems

## 2021-01-27 NOTE — PLAN OF CARE
Pt actively participated in a structured Therapeutic Recreation group with a focus on leisure participation, communication skills, and social engagement via a group game. Pt remained focused and engaged throughout full duration of group.  Pt mood was sociable and was appropriate with interactions. Pt picked 3 cards from the activity, representing or identifying with something positive about themselves and shared one of the cards with the group.

## 2021-01-27 NOTE — PLAN OF CARE
The patient specific goals include:   Patient will participate in unit programming  Patient will identify triggers and positive coping skills  Patient will take medications as prescribed by physician both for mental health and medical  Patient coached to work on coping packet    The patient identified the following reasons for hospitalization:  Poor Mental Health    The patient identified the following goals for discharge:    Improve Cognitive Skills    Resume College

## 2021-01-27 NOTE — PLAN OF CARE
"  Problem: Suicidal Behavior  Goal: Suicidal Behavior is Absent or Managed  Outcome: Improving    Patient has been visible in the milieu--attending groups.  No socialization noted.  Flat affect.    Presents as confused and conflicted.    This morning he was advised/ explanation about the 24 hour urine collection and the purpose--stated he didn't know why it was necessary, that he wasn't hydrated, ?how much do you need.   Urinal was provided.    When given morning medications he wanted to know what they were--when explained he rolled his eyes and stated he didn't need to \"here\".    Reluctant to engaged in conversation with this writer.  Again however, seemed confused.  Initially said no to suicidal ideation, then \"I don't know\".  Also responded \"I don't know\" to depression.  Appeared to have a lengthy conversation with Meche (OT).    Patient has been compliant with urine collection (collected 4 voids on this shift).  "

## 2021-01-27 NOTE — PLAN OF CARE
"Problem: OT General Care Plan  Goal: OT Goal 1  Description: Within 1 week, Pt will demonstrate increased self-esteem as evidenced by >2 self-reported positive affirmations.   Outcome: No Change    Writer checked in with pt 1:1, offering a chance for him to complete a self-assessment form so writer can best cater to pt needs on an individual and group-based level. Pt reviewed page and then began to share what was on his mind. Initially pt shared that he feels the assessment form and the crossword puzzles are giving him messages of what he is not doing right and making fun of his situation. Writer offered pt to verbally share what's on his mind if writing did not feel helpful. Pt does appear calculated in his verbal content but at times slow to respond. Pt is in significant distress and presents very depressed. Topics of the conversation included: shame he feels, guilt, lying/cheating, cognitive slowing, memory issues, confusion, unable to identify how much he has slept, difficulty putting together sentences, indecisiveness, fear of the future, and very low self-esteem. Pt at one point questioned if he has autism. Pt also shared fear he has that he does not know what his baseline is because he has cheated. Writer reminded pt of the successes he has had (I.e. getting into college, high ACT score, success in high school) and pt is accepting of some of this but is concerned he does not feel this way currently. Pt shared he does not feel like he belongs in the [employer] college he is in because he was cutting corners and the content was much harder than what he thought he was capable of. Pt also has been comparing himself to his peers on the unit who present lower functioning and he calls the hospital a \"\". Writer encouraged him to acknowledge this place as a hospital and what peers are presenting with and going through is independent of his situation. Ultimately pt does volunteer that he understands and knows he needs " "help to be here, which is the first time he has stated that. He has difficulty articulating his needs and advocating for himself given the shame he feels about being here. Given the frequent change of staff, writer seems to have developed some rapport with him and so I offered to be someone to check in with him daily to offer and assess any needs he may have and relay any messages if he wants. Writer also offered the potential for 1:1 psychotherapy or designated time with writer to help process. Pt very indecisive about this and unable to articulate yet if he wants that but writer will continue to offer this support and pt was receptive to that. Ultimately pt states he will be taking one day at a time. Pt was very appreciative of writer spending time with him and is understanding of staff being here to support him.     Pt participated in a mental health management group designed to promote insight, acceptance, and healthy stress management. Pt checked in stating \"I could be better\" and presents depressed. Pt was instructed to create a \"do it instead\" box and on the inside write down alternate/positive coping skills on slips of paper and place them in the box as a tool to use when having a negative urge or thought. Pt completed the project with fair focus. Pt was respectful in listening and responding to peers. Pt consistently contributed to the group discussion adding to the list of \"do it instead\" coping skills. Pt shared \"brain stimulating activities, asking for help, challenging thoughts, and exercise\" as strategies he is willing to try. Pt is calculated in his verbal content but is vague about what his main stressor is and is unsure what would work for it since he is \"currently dealing with it\". Pt attended the following OT clinic group and resumed working on his crossword puzzle.     Pt actively participated in a mental health management group with a focus on coping through movement to facilitate relaxation, " "stress management, and body awareness via chair yoga. Pt followed and engaged in 100% of the yoga poses, and verbalized feeling \"pretty good\"  at the end of group. Pt shared that he realized he is not as strong as he once was. Pt mentioned in prior groups that he wishes he could exercise more. Writer offered options to meet this need such as DVDs on the unit, yoga mats in his room, and print outs of various workouts pending his preferences. Pt was receptive to utilizing a mat in his room for strengthening.          "

## 2021-01-27 NOTE — PROVIDER NOTIFICATION
Patient has been compliant with urine collection today.    Mom called and she was updated--let her know that medicine was involved in Herminio's care and she was appreciative.    Due for Humira on Monday--mom will plan to bring in the dose.

## 2021-01-27 NOTE — PROGRESS NOTES
"[facility], [place]   Psychiatric Progress Note        Interim History:   The patient's care was discussed with the treatment team during the daily team meeting and/or staff's chart notes were reviewed.  Staff report patient and mother requested to transfer from unit 30 to unit 4A. Patient is requesting single room.    Psychiatric symptoms and interventions:   Upon interview with patient, he presents with a flat affect. He has latent speech. He complains of cognitive slowing. Patient worries that he is not in college and \"taking care of things.\" Patient struggles with being in the hospital saying that it reminds him of not doing well.     Patient deneis suicidal ideation and says \"I was mouthing off to my parents. I should have done that. It got me back in here.\"    Continued Zyprexa 10 mg which he reports \"makes me worse\". He is unable to say how it is making him worse. Discussed with mother and patient pharmacy consult to trail another med.            Medications:       azaTHIOprine  100 mg Oral Daily     lactobacillus rhamnosus (GG)  1 capsule Oral BID     methylfolate  7.5 mg Oral Daily     OLANZapine  10 mg Oral At Bedtime          Allergies:     Allergies   Allergen Reactions     Nuts      All nuts           Labs:   No results found for this or any previous visit (from the past 24 hour(s)).       Psychiatric Examination:     /73   Pulse 103   Temp 98  F (36.7  C) (Oral)   Resp 16   Ht 1.905 m (6' 3\")   Wt 83 kg (183 lb)   SpO2 98%   BMI 22.87 kg/m    Weight is 183 lbs 0 oz  Body mass index is 22.87 kg/m .  Orthostatic Vitals       Most Recent      Sitting Orthostatic /73 01/27 0850    Sitting Orthostatic Pulse (bpm) 103 01/27 0850    Standing Orthostatic /58 01/27 0850    Standing Orthostatic Pulse (bpm) 120 01/27 0850            Appearance: awake, alert, adequately groomed and dressed in hospital scrubs  Attitude:  guarded  Eye Contact:  fair  Mood:  " anxious, sad  and depressed  Affect:  intensity is blunted  Speech:  increased speech latency and paucity of speech  Psychomotor Behavior:  no evidence of tardive dyskinesia, dystonia, or tics  Throught Process:  disorganized Patient recognizes when he is tangential and apologizes.   Associations:  no loose associations, paranoia  Thought Content:  Patient denies SI but continues to rumnate about school which has been a trigger for his SI.  Insight:  limited  Judgement:  limited  Oriented to:  time, person, and place  Attention Span and Concentration:  limited  Recent and Remote Memory:  fair    Clinical Global Impressions  First:     Most recent:            Precautions:     Behavioral Orders   Procedures     Code 1 - Restrict to Unit     Discontinue 1:1 attendant for suicide risk     Order Specific Question:   I have performed an in person assessment of the patient     Answer:   Based on this assessment the patient no longer requires a one on one attendant at this point in time.     Order Specific Question:   Rationale     Answer:   Patient States able to remain safe in hospital     Routine Programming     As clinically indicated     Single Room     Status 15     Every 15 minutes.          DIagnoses:   1.  Unspecified psychotic disorder.   2.  Cannabis use disorder, moderate, in recent sustained remission.   3.  Unspecified anxiety disorder with obsessive features.   4.  Nicotine use disorder.          Plan:     Legal status: voluntary     Medication management:   Zyprexa 10 mg     Medical: Crohn's disease, ruling out Boyd's disease. Ceruloplasmin 14    Behavioral/psychological/social:  Encouraged patient to attend therapeutic hospital programming as tolerated  Transferred from unit 30 to unit 4A on 1/26.  Pharmacy consult  Disposition plan:   Reason for continued hospitalization: Patient is unsafe to return to home due to suicidal ideation.  Stabilization with medications- pharmacy consult, Rule out Boyd's  disease (internal medicine following)  Estimated length of stay: 5-7 days   Return to home with mother and [facility] program     Video-Visit Details     Type of service:  Video Visit     Video Start Time (time video started): 1048     Video End Time (time video stopped): 1057    Originating Location (pt. Location): Other Station 4A     Distant Location (provider location): Provider remote location     Mode of Communication:  Video Conference via [employer]     Physician has received verbal consent for a Video Visit from the patient? Yes

## 2021-01-28 LAB — COPPER SERPL-MCNC: 53.8 UG/DL (ref 70–140)

## 2021-01-28 PROCEDURE — 82525 ASSAY OF COPPER: CPT | Performed by: PHYSICIAN ASSISTANT

## 2021-01-28 PROCEDURE — 250N000013 HC RX MED GY IP 250 OP 250 PS 637: Performed by: PSYCHIATRY & NEUROLOGY

## 2021-01-28 PROCEDURE — H2032 ACTIVITY THERAPY, PER 15 MIN: HCPCS

## 2021-01-28 PROCEDURE — 124N000002 HC R&B MH UMMC

## 2021-01-28 PROCEDURE — 250N000013 HC RX MED GY IP 250 OP 250 PS 637: Performed by: CLINICAL NURSE SPECIALIST

## 2021-01-28 PROCEDURE — 99232 SBSQ HOSP IP/OBS MODERATE 35: CPT | Mod: 95 | Performed by: CLINICAL NURSE SPECIALIST

## 2021-01-28 PROCEDURE — 250N000012 HC RX MED GY IP 250 OP 636 PS 637: Performed by: PSYCHIATRY & NEUROLOGY

## 2021-01-28 PROCEDURE — G0177 OPPS/PHP; TRAIN & EDUC SERV: HCPCS

## 2021-01-28 RX ORDER — HYDROXYZINE HYDROCHLORIDE 25 MG/1
25-50 TABLET, FILM COATED ORAL EVERY 4 HOURS PRN
Status: DISCONTINUED | OUTPATIENT
Start: 2021-01-28 | End: 2021-02-05 | Stop reason: HOSPADM

## 2021-01-28 RX ORDER — ARIPIPRAZOLE 10 MG/1
10 TABLET ORAL DAILY
Status: DISCONTINUED | OUTPATIENT
Start: 2021-01-28 | End: 2021-02-04

## 2021-01-28 RX ORDER — OLANZAPINE 5 MG/1
5-10 TABLET ORAL 3 TIMES DAILY PRN
Status: DISCONTINUED | OUTPATIENT
Start: 2021-01-28 | End: 2021-02-04

## 2021-01-28 RX ORDER — EPINEPHRINE 0.3 MG/.3ML
0.3 INJECTION SUBCUTANEOUS DAILY PRN
Status: DISCONTINUED | OUTPATIENT
Start: 2021-01-28 | End: 2021-02-05 | Stop reason: HOSPADM

## 2021-01-28 RX ORDER — TRAZODONE HYDROCHLORIDE 100 MG/1
100 TABLET ORAL
Status: DISCONTINUED | OUTPATIENT
Start: 2021-01-28 | End: 2021-02-05 | Stop reason: HOSPADM

## 2021-01-28 RX ORDER — OLANZAPINE 10 MG/2ML
10 INJECTION, POWDER, FOR SOLUTION INTRAMUSCULAR 3 TIMES DAILY PRN
Status: DISCONTINUED | OUTPATIENT
Start: 2021-01-28 | End: 2021-02-04

## 2021-01-28 RX ADMIN — ARIPIPRAZOLE 10 MG: 10 TABLET ORAL at 15:50

## 2021-01-28 RX ADMIN — LEVOMEFOLATE CALCIUM 7.5 MG TABLET 7.5 MG: TABLET at 08:58

## 2021-01-28 RX ADMIN — Medication 1 CAPSULE: at 08:58

## 2021-01-28 RX ADMIN — Medication 1 CAPSULE: at 21:31

## 2021-01-28 RX ADMIN — AZATHIOPRINE 100 MG: 50 TABLET ORAL at 08:58

## 2021-01-28 RX ADMIN — Medication 10 MG: at 21:32

## 2021-01-28 ASSESSMENT — ACTIVITIES OF DAILY LIVING (ADL)
HYGIENE/GROOMING: HANDWASHING;INDEPENDENT
DRESS: STREET CLOTHES;INDEPENDENT
ORAL_HYGIENE: INDEPENDENT
ORAL_HYGIENE: INDEPENDENT
DRESS: STREET CLOTHES;INDEPENDENT
HYGIENE/GROOMING: HANDWASHING;INDEPENDENT

## 2021-01-28 NOTE — PROGRESS NOTES
Work Completed: Attended team. Reviewed charting.     Discharge plan or goal: Still assessing pt's needs in regards to what will best support his MH at time of discharge. Pt doesn't have an intake with the [facility] program schedule for February 16th that he would like to go to,                 Barriers to discharge: Symptom Management.

## 2021-01-28 NOTE — PLAN OF CARE
Problem: Suicidal Behavior  Goal: Suicidal Behavior is Absent or Managed  Outcome: No Change  Pt denies SI tonight but endorses depression mostly at being here on the unit. He has limited insight and presents as confused as to his reasons for being here. He has been quiet tonight, showered and has been watching tv.

## 2021-01-28 NOTE — PROGRESS NOTES
"Pt came out of his room and stated he was scared of another pt going into his room and harming him.  Pt was reassured that he is safe on this unit.   Pt was offered a prn which he declined stating \"I do not need anything I just want to be safe\".  Writer reiterated that staff do 15 minutes check on all the patients. Will continue to monitor.  "

## 2021-01-28 NOTE — PROGRESS NOTES
"[facility], [place]   Psychiatric Progress Note        Interim History:   The patient's care was discussed with the treatment team during the daily team meeting and/or staff's chart notes were reviewed.  Staff report patient is visible in the milieu and attends groups.    Psychiatric symptoms and interventions:  Upon interview with patient, made several paranoid statements. He complains of \"slow thinking\". He struggles with groups. He has anxiety about being in the hospital. Discussed with patient of starting Abilify 10 mg. Will discontinue Zyprexa. Increased trazodone to 100 mg to address sleep.     Discussed progress, medications and treatment with mother.          Medications:       [START ON 2/1/2021] adalimumab  40 mg Subcutaneous Weekly     ARIPiprazole  10 mg Oral Daily     azaTHIOprine  100 mg Oral Daily     lactobacillus rhamnosus (GG)  1 capsule Oral BID     melatonin  10 mg Oral At Bedtime     methylfolate  7.5 mg Oral Daily          Allergies:     Allergies   Allergen Reactions     Nuts      All nuts           Labs:   No results found for this or any previous visit (from the past 24 hour(s)).       Psychiatric Examination:     /80   Pulse 95   Temp 97.8  F (36.6  C) (Tympanic)   Resp 16   Ht 1.905 m (6' 3\")   Wt 83 kg (183 lb)   SpO2 95%   BMI 22.87 kg/m    Weight is 183 lbs 0 oz  Body mass index is 22.87 kg/m .  Orthostatic Vitals       Most Recent      Sitting Orthostatic /73 01/27 0850    Sitting Orthostatic Pulse (bpm) 103 01/27 0850    Standing Orthostatic /48 01/28 0900    Standing Orthostatic Pulse (bpm) 80 01/28 0900           Appearance: awake, alert, adequately groomed and dressed in hospital scrubs  Attitude:  guarded  Eye Contact:  fair  Mood:  anxious, sad  and depressed  Affect:  intensity is blunted  Speech:  increased speech latency and paucity of speech  Psychomotor Behavior:  no evidence of tardive dyskinesia, dystonia, or " tics  Throught Process:  disorganized Patient recognizes when he is tangential and apologizes.   Associations:  no loose associations, paranoia  Thought Content:  Patient denies SI but continues to rumnate about school which has been a trigger for his SI.  Insight:  limited  Judgement:  limited  Oriented to:  time, person, and place  Attention Span and Concentration:  limited  Recent and Remote Memory:  fair    Clinical Global Impressions  First:     Most recent:            Precautions:     Behavioral Orders   Procedures     Code 1 - Restrict to Unit     Discontinue 1:1 attendant for suicide risk     Order Specific Question:   I have performed an in person assessment of the patient     Answer:   Based on this assessment the patient no longer requires a one on one attendant at this point in time.     Order Specific Question:   Rationale     Answer:   Patient States able to remain safe in hospital     Routine Programming     As clinically indicated     Single Room     Status 15     Every 15 minutes.          DIagnoses:   1.  Unspecified psychotic disorder.   2.  Cannabis use disorder, moderate, in recent sustained remission.   3.  Unspecified anxiety disorder with obsessive features.   4.  Nicotine use disorder.           Plan:      Legal status: voluntary      Medication management:   Abilify 10 mg      Medical: Crohn's disease, ruling out Boyd's disease. Ceruloplasmin 14     Behavioral/psychological/social:  Encouraged patient to attend therapeutic hospital programming as tolerated  Transferred from unit 30 to unit 4A on 1/26.  Pharmacy consult  Disposition plan:   Reason for continued hospitalization: Patient is unsafe to return to home due to suicidal ideation.  Stabilization with medications- pharmacy consult, Rule out Boyd's disease (internal medicine following)  Estimated length of stay: 5-7 days   Return to home with mother and [facility] program      Video-Visit Details     Type of service:  Video  Visit     Video Start Time (time video started): 1045     Video End Time (time video stopped): 1056    Originating Location (pt. Location): Other Station 4A     Distant Location (provider location): Provider remote location     Mode of Communication:  Video Conference via [employer]     Physician has received verbal consent for a Video Visit from the patient? Yes

## 2021-01-28 NOTE — PROGRESS NOTES
Pt reluctantly participated in dance/movement therapy (DMT) primarily noticing physical reactions to music selections by participants.  One theme that emerged was that most participants were not in the place they expected or planned at the moment, and that was disappointing, or surprising or confusing or angering.  The session ended with a song written by a group member with a message of being sweet, and allowing oneself the space and time one might need to make things right.  The physical embodiment of this was a sigh.      Pt stated he felt like these interventions were childish- like something he participated in during  or middle school.  He stated he is a college student and hoping to get into the [employer] and wants to speak with his .  This therapist directed him to Hilary, his CTC, and Meche, the OT on the unit.       01/28/21 1030   Dance Movement Therapy   Type of Intervention structured groups   Response participates with encouragement   Hours 0.5

## 2021-01-28 NOTE — PLAN OF CARE
Problem: OT General Care Plan  Goal: OT Goal 1  Description: Within 1 week, Pt will demonstrate increased self-esteem as evidenced by >2 self-reported positive affirmations.   Outcome: Improving    Pt attended discussion and hands-on activity focused on identifying current struggles with time management and how that relates to mental health symptoms. The focus was also on possible skills and strategies one might use to improve time management skills. Pt contributed to the group discussion. Pt completed the hands-on activity independently: creating and laminating a white board daily planner to encourage goal-setting, routine, and time management. Writer spent time with him 1:1 checking in on pt. Pt continues to have a negative narrative but slightly brightens when talking about college and asking writer about the [employer]. Pt enjoyed talking about different buildings on campus and some of the interesting classes he has taken. Writer continues to validate pt for where he is at and provide him with opportunities to challenge his negative thinking patterns.

## 2021-01-28 NOTE — PLAN OF CARE
"  Problem: Suicidal Behavior  Goal: Suicidal Behavior is Absent or Managed  Outcome: No Change    Patient visible in milieu for breakfast, 50% or better, affect blunt, appears depressed, \"why can't I go home, this is not helping me,\" patient asks writer about the rounds and what gets written down, \"is this something that is going to be held against me, I am wondering what is taking so long and how to get out of here,\" denies any SI/SIB thoughts, says he is not really sleeping at night and bothered by the staff checking in on him every 15 minutes, denies any hallucinations, yet is worried about all of his school and life responsibilities and continues to [place] around negativity and declines any offers or suggestions given by writer to attend group, play a game or find something to do while awaiting.   Reminders of awaiting test results and medicine recommendations, offer prn for anxiety and perseverative thoughts; active listening and ice pack for distress-patient did lie and rest for about 15 minutes with ice pack although patient denies any help; patient attends groups yet seems to keep to himself.  No aggression toward self or others, accepting of 1:1 conversation and engagement despite objections.     "

## 2021-01-28 NOTE — PROGRESS NOTES
Brief Medicine Note:    IM following peripherally for r/o Boyd's disease in setting of new onset psychoses and low ceruloplasmin. Called to discuss with GI to review case and determine if any further work-up needed in r/o of Boyd's. Could consider abdominal US to evaluate liver, however LFTs all wnl. No evidence of hemolytic anemia.    Plan:  - Pending urine copper  - Follow up outpatient for Optho consult      Medicine will continue to follow peripherally for pending urine copper and will discuss with GI if any further recommendations pending results. Please reach out with any further questions or concerns.    Zena Mcdonald PA-C  Internal Medicine [facility] Hospitalist  [facility]  Pager: [phone]

## 2021-01-28 NOTE — PHARMACY-CONSULT NOTE
Pharmacy Consult    Pharmacist was consulted by Deb Naegele, APRN, CNS:    Patient has first episode psychosis. Currently on Zyprexa which is in the red zone on his gene site testing. Zyprexa has not really treated the paranoia. Question - effective antipsychotic med to treat paranoia that would not interact with his Crohn s meds      Julian Berg is a 20-year-old  male who was admitted voluntarily to [facility], Behavioral [facility] Unit [facility], on January 25, 2021 for suicidal ideation with psychotic symptoms. Patient recently discharged from the unit on January 23, 2021 after a 10-day hospitalization for similar symptoms.  Patient was started on olanzapine and titrated up to 10mg by mouth at bedtime with only moderate improvement in psychotic symptoms.  Outpatient resources were scheduled for the patient, including working with the [facility].  After two days at home, mother called [facility] and then 911 for suicidal ideation and ongoing paranoid statements.     Past Psychiatric History:  Outpatient mental health provider: Aga Dumont CNP through [facility]  Past medication trials: citalopram, escitalopram, sertraline, gabapentin, melatonin, trazodone, and most recently olanzapine.  Psychological testing at [facility] with Dr. Wilson in 12/2019.    Gene Sight Testing in March 2020.  Psychological testing on 1/21/2021 during hospitalization by Dr. Mike, PhD, LP.    Substance Use History:   History of daily THC use (vaping), abusing amphetamines by crushing and snorting.  Tried LSD in high school approximately 6 times. History of binge drinking. No chemical dependency treatments.     Pertinent Past Medical History:  1) Crohn s disease, diagnosed at age 10 or 11.   2) Brain MRI (1/22/2021): Within normal limits, no acute intracranial pathology found per report   3) 1/23/2021 tests: Negative Lyme s disease and non-reactive HIV  antigen  4) Ceruloplasmin: 14 mg/dL (range: 20-60 mg/dL) on 1/23/2021  5) Copper: 53.8 ug/dL (range:  ug/dL) on 1/26/2021     Diagnoses (per psychological testing on 1/21/2021 by Dr. Mike, PhD, LP):  1) Unspecified psychotic disorder  2) Cannabis use disorder, moderate, in recent sustained remission   3) Rule out unspecified anxiety disorder with obsessive features     Current Medications:  1) Adalimumab (Humira): Inject 40mg subcutaneously every Monday for Crohn s disease   2) Azathioprine 100mg by mouth daily for Crohn s disease   3) Lactobacillus (Cultrelle) one capsule by mouth twice daily   4) Melatonin 10mg by mouth at bedtime as needed for sleep   5) Methylfolate 7.5mg by mouth daily   6) Olanzapine 10mg by mouth at bedtime   7) Patient has not utilized any prn doses of standard unit orders of hydroxyzine or olanzapine since admission    Allergies:   Peanuts, tree, nuts, dust and pollen. No known drug allergies.     Pertinent Labs:  ANC:  4.1 mm3 on 1/15/93738 (day 1 of olanzapine)   3.4 mm3 on 1/23/2021      WBC:   8 mm3 on 1/15/72416  6.9 mm3 on 1/23/2021      Ht/Wt (on 1/25/2021): 6 3  /83kg; BMI = 22.87kg/m23    Gene Sight Testing (from 3/3/2020)  Pertinent results for antipsychotic drug metabolism included. Please refer to full report via Epic media tab scanned documents for further information     Extensive (normal) metabolizer for CYPs: 1A2, 2B6, 2C19, 2C9, 2D6, 3A4 and PKU8Q10  Ultra-rapid metabolizer for UGT1A4       Assessment:     Pharmacist did not identify any pharmacokinetic drug-drug interactions between antipsychotic medications and the patient s current medications for Crohn s disease (adalimumab and azathioprine).  However, pharmacodynamic interactions were identified with regards to antipsychotic-induced blood dyscrasias, which is of importance for this patient who is on immunosuppressant therapy for management of Crohn s disease.       All Second Generation Antipsychotics (SGAs)  have case reports of blood dyscrasias, primarily neutropenia (synonymous with leukopenia in the literature since neutrophils make up 70 to 80% of leukocytes).  Although actual incidence is unknown, literature suggests the risk increases with antipsychotic polytherapy vs. monotherapy, use of concomitant medications with a known risk of drug-induced neutropenia (e.g., valproate, carbamazepine) and high doses of antipsychotics (exception: clozapine does not appear to be dose-dependent).  A variety of mechanisms appear to be involved including immunologic and allergic mechanisms as well as genetic factors that may cause decreased bone marrow production or increased peripheral destruction of white blood cells leading to neutropenia.    Clozapine has the highest incidence of neutropenia (agranulocytosis occurring in approximately 1% of patients) with REMS program monitoring requirements.  Other SGAs appear to have a lower risk, but all have case reports.  In most cases, drug-induced neutropenia appeared to be transient and drug discontinuation was not necessary.  The onset typically occurred within the first few weeks of therapy.  In cases of severe neutropenia, the antipsychotic was discontinued and the neutropenia resolved within 30 days.  Newer SGAs (e.g., asenapine, cariprazine, lurasidone) have even fewer case reports, but it is unknown if this is due to the drug being on the market for less time than older SGAs. Some case reports have shown aripiprazole to have a less overall risk and was successfully used in individuals who experienced neutropenia from other antipsychotics.      Since the patient is an ultra-rapid metabolizer of UGT1A4, he may require a higher dose for efficacy of the following antipsychotic medications: clozapine, olanzapine, haloperidol, asenapine.  This explains why olanzapine was in the  red zone  on the gate5 testing report. It does not mean it cannot be prescribed, but it should be  prescribed with the awareness that he may require a higher dose for efficacy. However, given his underlying Crohn s disease, antipsychotics that are UGT1A4 substrates would not be recommended as first line options given the increased risk for drug-induced neutropenia at higher doses.  Although patient has not experienced drug-induced neutropenia with olanzapine, his ANC has trended down since drug initiation and there are other antipsychotics that would be preferable for this patient.  Since the patient has not trialed other antipsychotics besides olanzapine, it would be reasonable to trial aripiprazole given its favorable side effect profile.         Recommendations:  1) Obtain baseline complete blood count and differential prior to switching antipsychotic regimen.  If lab work is within normal limits, discontinue olanzapine and start aripiprazole 5 or 10mg by mouth daily. Titrate based on efficacy and tolerability, up to 30mg daily.  Consider scheduling aripiprazole in the morning (vs. bedtime) since a percentage of patients may find this medication activating.     2) Discontinue PRN olanzapine (from admission order set), patient has not required any doses since admission. Consider alternative PRN antipsychotic, if needed, preferably the same drug as the scheduled antipsychotic.    3) Monitor for signs and symptoms of infection (e.g., temperature) and consider holding antipsychotic medication if symptomatic.    4) It is not required to monitor absolute neutrophil count, but if want to monitor closely, may consider trending weekly absolute neutrophil count for the first month and as needed thereafter, depending on other medication changes or antipsychotic dose increase.  Hold antipsychotic medication if absolute neutrophil count falls below 4803dm5 .      Sources:   Lelia FLORES, Nirmala C, Juliano PC. Olanzapine-induced neutropenia: mechanism and treatment. J Clin Psychopharmacol. 2004 Apr; 24(2):234-235.     Humberto  R, Todd ANDRADE. Haematological toxicity of drugs used in psychiatry. Hum Psychopharmacol. 2008 Jan;23 Suppl 1:27-41.    Mancilla MH, Park JI, Park TW. A case with neutropenia related with the use of various atypical antipsychotics. Psychiatry Investig. 2013 Dec; 10(4): 428-431.    Chikier M, Tiffanie A, Anabel G, Alma W. Neutropenia induced by second generation antipsychotics: A prospective investigation. Pharmacopsychiatry 2010; 43: 41-44.    Adrienne DO, Alexy E, Delfino VANCE, Silke JAMESON. Is aripiprazole the only choice of treatment of the patients who developed anti-psychotic agents-induced leucopenia and neutropenia? A case report. J Psychopharmacol. 2008;22:333-335.        Thank you for the consult.  Case discussed with Deb Naegele, APRN, CNS    Zahra Morel, Pharm.D., [facility]  Behavioral Health Inpatient Pharmacist  [facility] ([facility])  Phone: *[phone] ([employer] [facility] or [phone]

## 2021-01-29 PROCEDURE — 124N000002 HC R&B MH UMMC

## 2021-01-29 PROCEDURE — 250N000013 HC RX MED GY IP 250 OP 250 PS 637: Performed by: CLINICAL NURSE SPECIALIST

## 2021-01-29 PROCEDURE — 250N000013 HC RX MED GY IP 250 OP 250 PS 637: Performed by: PSYCHIATRY & NEUROLOGY

## 2021-01-29 PROCEDURE — G0177 OPPS/PHP; TRAIN & EDUC SERV: HCPCS

## 2021-01-29 PROCEDURE — 99232 SBSQ HOSP IP/OBS MODERATE 35: CPT | Mod: 95 | Performed by: CLINICAL NURSE SPECIALIST

## 2021-01-29 PROCEDURE — 250N000012 HC RX MED GY IP 250 OP 636 PS 637: Performed by: PSYCHIATRY & NEUROLOGY

## 2021-01-29 RX ADMIN — AZATHIOPRINE 100 MG: 50 TABLET ORAL at 09:12

## 2021-01-29 RX ADMIN — Medication 1 CAPSULE: at 21:31

## 2021-01-29 RX ADMIN — ARIPIPRAZOLE 10 MG: 10 TABLET ORAL at 09:12

## 2021-01-29 RX ADMIN — Medication 10 MG: at 21:31

## 2021-01-29 RX ADMIN — TRAZODONE HYDROCHLORIDE 100 MG: 100 TABLET ORAL at 21:31

## 2021-01-29 RX ADMIN — LEVOMEFOLATE CALCIUM 7.5 MG TABLET 7.5 MG: TABLET at 09:12

## 2021-01-29 RX ADMIN — Medication 1 CAPSULE: at 09:12

## 2021-01-29 ASSESSMENT — ACTIVITIES OF DAILY LIVING (ADL)
HYGIENE/GROOMING: INDEPENDENT
ORAL_HYGIENE: INDEPENDENT
HYGIENE/GROOMING: HANDWASHING;INDEPENDENT
DRESS: INDEPENDENT
LAUNDRY: WITH SUPERVISION

## 2021-01-29 NOTE — PROGRESS NOTES
"[facility], [place]   Psychiatric Progress Note        Interim History:   The patient's care was discussed with the treatment team during the daily team meeting and/or staff's chart notes were reviewed.  Staff report patient is visible in the milieu and is attending groups.     Psychiatric symptoms and interventions:   Upon interview with patient, ruminates about missing classes at the [employer] even though he is on a leave of absence. He ruminates about struggling in groups and that he should be working at a higher level. He agreed to 1:1 art therapy. He talked about being anxious because he ws in the hospital.     Patient started Abilify yesterday. Will continue to monitor. Explained treatment plan to mother on 1/28.         Medications:       [START ON 2/1/2021] adalimumab  40 mg Subcutaneous Weekly     ARIPiprazole  10 mg Oral Daily     azaTHIOprine  100 mg Oral Daily     lactobacillus rhamnosus (GG)  1 capsule Oral BID     melatonin  10 mg Oral At Bedtime     methylfolate  7.5 mg Oral Daily          Allergies:     Allergies   Allergen Reactions     Nuts      All nuts           Labs:   No results found for this or any previous visit (from the past 24 hour(s)).       Psychiatric Examination:     /67   Pulse 81   Temp 97.4  F (36.3  C) (Tympanic)   Resp 16   Ht 1.905 m (6' 3\")   Wt 83 kg (183 lb)   SpO2 97%   BMI 22.87 kg/m    Weight is 183 lbs 0 oz  Body mass index is 22.87 kg/m .  Orthostatic Vitals       Most Recent      Sitting Orthostatic /67 01/29 0926    Sitting Orthostatic Pulse (bpm) 81 01/29 0926    Standing Orthostatic /84 01/29 0926    Standing Orthostatic Pulse (bpm) 102 01/29 0926      Appearance: awake, alert, adequately groomed and dressed in hospital scrubs  Attitude:  guarded  Eye Contact:  fair  Mood:  anxious, sad  and depressed  Affect:  intensity is blunted  Speech:  increased speech latency and paucity of speech  Psychomotor Behavior:  no " evidence of tardive dyskinesia, dystonia, or tics  Throught Process:  disorganized Patient recognizes when he is tangential and apologizes.   Associations:  no loose associations, paranoia  Thought Content:  Patient denies SI but continues to rumnate about school which has been a trigger for his SI.  Insight:  limited  Judgement:  limited  Oriented to:  time, person, and place  Attention Span and Concentration:  limited  Recent and Remote Memory:  fair      Clinical Global Impressions  First:     Most recent:            Precautions:     Behavioral Orders   Procedures     Code 1 - Restrict to Unit     Discontinue 1:1 attendant for suicide risk     Order Specific Question:   I have performed an in person assessment of the patient     Answer:   Based on this assessment the patient no longer requires a one on one attendant at this point in time.     Order Specific Question:   Rationale     Answer:   Patient States able to remain safe in hospital     Routine Programming     As clinically indicated     Single Room     Status 15     Every 15 minutes.          DIagnoses:   1.  Unspecified psychotic disorder.   2.  Cannabis use disorder, moderate, in recent sustained remission.   3.  Unspecified anxiety disorder with obsessive features.   4.  Nicotine use disorder.          Plan:   Legal status: voluntary      Medication management:   Abilify 10 mg      Medical: Crohn's disease, ruling out Boyd's disease. Ceruloplasmin 14     Behavioral/psychological/social:  Encouraged patient to attend therapeutic hospital programming as tolerated  Transferred from unit 30 to unit 4A on 1/26.  Pharmacy consult  1:1 art therapy  Disposition plan:   Reason for continued hospitalization: Patient is unsafe to return to home due to suicidal ideation.  Stabilization with medications- pharmacy consult, Rule out Boyd's disease (internal medicine following)  Estimated length of stay: 5-7 days   Return to home with mother and [facility]      Video-Visit Details     Type of service:  Video Visit     Video Start Time (time video started): 1000     Video End Time (time video stopped): 1016    Originating Location (pt. Location): Other Station 4A     Distant Location (provider location): Provider remote location     Mode of Communication:  Video Conference via [employer]     Physician has received verbal consent for a Video Visit from the patient? Yes

## 2021-01-29 NOTE — PLAN OF CARE
Problem: General Rehab Plan of Care  Goal: Therapeutic Recreation/Music Therapy Goal (Art Therapy)  Description: The patient and/or their representative will achieve their patient-specific goals related to the plan of care.  The patient-specific goals include: emotional expression  Outcome: No Change     Art Therapy directive is to create a drawing of self as a landscape. Goals of directive: to create a personal self narrative, emotional expression, identifying positive strengths and goals. Pt was a positive participant, focused on task for the full duration of group. Pt finished and drawing and briefly shared with group. Pts mood was calm, pleasant and cooperative.

## 2021-01-29 NOTE — PROGRESS NOTES
Work Completed: Attended team. Reviewed chart. Checked in with pt. He had questions around when he was discharging-[facility] let pt know that it will likely be mid to end of next week due to the medical work up that is being done to rule out any organic factors that could be contributing to his current mental state.     Discharge plan or goal: Ongoing assessment. Pt has intake with [facility] on 2/16 that he would still like to attend.                 Barriers to discharge: Symptom management.

## 2021-01-29 NOTE — PLAN OF CARE
"Writer offers verbal med ed on Abilify and patient takes first dose of Abilify 10 mg 3:30 without incident; affect blunt, quiet demeanor and asks writer for a unit folder and toiletries; patient more withdrawn to self more than other evenings per staff report and spends a lot of time on the phone this evening but did attend evening Art group. VSS and denies pain, when asked about side effects from Abilify, patient responds with disbelief and \"no antipsychotic is going to help, it is more complicated that that, long story,\" patient appears very depressed, negative thoughts, denies suicidal ideation or thoughts of not wanting to live; takes melatonin with encouragement.   "

## 2021-01-29 NOTE — PLAN OF CARE
"Problem: OT General Care Plan  Goal: OT Goal 1  Description: Within 1 week, Pt will demonstrate increased self-esteem as evidenced by >2 self-reported positive affirmations.   Outcome: Improving    Pt actively participated in a group task involving baking fudgy black bean brownies. Purpose of structured group: provide socialization, promote sensory integration, encourage patience and concentration, and develop independent living skills. Pt collaborated with peers and writer to accurately follow step-by-step instructions, demonstrated safety using the oven, and followed sanitation/hygiene guidelines provided by writer. Pt does request reassurance from writer at various times of group, but for the most part is independent in his approach. Half way through group, pt requests to leave so he can \"gather my thoughts before speaking to the provider\". Pt was engaged for the majority of group. Pt was absent during the discussion and educational component of group.    Pt actively participated in occupational therapy clinic. During initial check in, pt reports \"feeling tired\" and he does appear this way. Pt states his goal this weekend id to \"keep track of how I feel on my meds\". Writer offered suggestions of ways to keep track via journaling/logging this. Pt receptive. Pt was able to ask for assistance as needed, and independently initiate a familiar, self-selected task. Pt demonstrated good focus and appeared comfortable interacting with peers. Pt more social with peers on the unit in this group today.     "

## 2021-01-29 NOTE — PLAN OF CARE
"Patient up and visible all shift thus far.  Was somewhat irritated this morning as another patient tried to get into his room last evening.  And felt his sleep was disrupted by same person.    Per usual was slightly oppositional to medication, but did take his meds ok.    Affect remains flat.  Has been attending groups.    Reports he was told by [facility]  possible discharge late next week.    Continues to feel that his stay here is not that helpful.    Denies SI/SIB.  Does acknowledge some depression and anxiety: \"being here makes it worse\".    Reports his life as somewhat privileged--\"live in a really nice apartment\".  States he knows he needs to \"grow up\" and make better decisions.    Worried about gaining weight while here.    Positive feedback for sharing his concerns.  "

## 2021-01-30 PROCEDURE — 250N000013 HC RX MED GY IP 250 OP 250 PS 637: Performed by: CLINICAL NURSE SPECIALIST

## 2021-01-30 PROCEDURE — H2032 ACTIVITY THERAPY, PER 15 MIN: HCPCS

## 2021-01-30 PROCEDURE — 250N000013 HC RX MED GY IP 250 OP 250 PS 637: Performed by: PSYCHIATRY & NEUROLOGY

## 2021-01-30 PROCEDURE — 124N000002 HC R&B MH UMMC

## 2021-01-30 PROCEDURE — 250N000012 HC RX MED GY IP 250 OP 636 PS 637: Performed by: PSYCHIATRY & NEUROLOGY

## 2021-01-30 RX ADMIN — ARIPIPRAZOLE 10 MG: 10 TABLET ORAL at 08:43

## 2021-01-30 RX ADMIN — Medication 10 MG: at 22:00

## 2021-01-30 RX ADMIN — Medication 1 CAPSULE: at 08:42

## 2021-01-30 RX ADMIN — HYDROXYZINE HYDROCHLORIDE 50 MG: 25 TABLET, FILM COATED ORAL at 22:00

## 2021-01-30 RX ADMIN — TRAZODONE HYDROCHLORIDE 100 MG: 100 TABLET ORAL at 22:01

## 2021-01-30 RX ADMIN — AZATHIOPRINE 100 MG: 50 TABLET ORAL at 08:43

## 2021-01-30 RX ADMIN — LEVOMEFOLATE CALCIUM 7.5 MG TABLET 7.5 MG: TABLET at 08:43

## 2021-01-30 RX ADMIN — Medication 1 CAPSULE: at 22:01

## 2021-01-30 ASSESSMENT — ACTIVITIES OF DAILY LIVING (ADL)
LAUNDRY: WITH SUPERVISION
DRESS: INDEPENDENT
ORAL_HYGIENE: INDEPENDENT
HYGIENE/GROOMING: INDEPENDENT
HYGIENE/GROOMING: INDEPENDENT

## 2021-01-30 NOTE — PLAN OF CARE
Problem: Suicidal Behavior  Goal: Suicidal Behavior is Absent or Managed  Note: Pt had an uneventful shift. He has been visible, but withdrawn in the milieu. Pt denies SI, SIB and hallucinations. Pt has been taking his medications and reports feeling groggy, but is not sure if that is the medications or if it is due to not being physically active on the unit. Pt reports sleep has been poor and utilized PRN trazodone this evening. Pt reports appetite has been good. Pt continues to report frustration about being in the hospital and being unable to Pt denies any other concerns at this time.

## 2021-01-30 NOTE — PLAN OF CARE
"  Problem: Psychotic Symptoms  Goal: Psychotic Symptoms  Description: Signs and symptoms of listed problems will be absent or manageable.  Outcome: Improving     Patent up and out for morning routine.  Flat affect.    Continues to be negative regarding his stay in the hospital.  \"I'm gaining weight.\"  \"The food here is bad--full of sugar\".  \" I can't get any exercise\".  \"Being here is not good for me\".    \"Medications aren't doing me any good--I am taking them only to get discharged.\"     Declined suggestion of reading (\"I am not a reader\"), journal (\"with those bad pencils\"?), walking the halls (I am a runner).    Denies SI/SIB.  Denies hallucinations.  Reports depression because he is in the hospital.    Feels he is not getting good sleep because of his bed.  "

## 2021-01-31 LAB
COLLECT DURATION TIME UR: 24 HR
COPPER 24H UR-MRATE: ABNORMAL UG/D (ref 3–45)
COPPER ?TM UR-MCNC: <1 UG/DL (ref 0.3–3.2)
COPPER/CREAT 24H UR: ABNORMAL UG/G CRT (ref 10–45)
CREAT 24H UR-MRATE: 1961 MG/D (ref 1000–2500)
CREAT UR-MCNC: 53 MG/DL
SPECIMEN VOL ?TM UR: 3700 ML

## 2021-01-31 PROCEDURE — 250N000013 HC RX MED GY IP 250 OP 250 PS 637: Performed by: PSYCHIATRY & NEUROLOGY

## 2021-01-31 PROCEDURE — 250N000012 HC RX MED GY IP 250 OP 636 PS 637: Performed by: PSYCHIATRY & NEUROLOGY

## 2021-01-31 PROCEDURE — 124N000002 HC R&B MH UMMC

## 2021-01-31 PROCEDURE — 250N000013 HC RX MED GY IP 250 OP 250 PS 637: Performed by: CLINICAL NURSE SPECIALIST

## 2021-01-31 RX ADMIN — LEVOMEFOLATE CALCIUM 7.5 MG TABLET 7.5 MG: TABLET at 09:10

## 2021-01-31 RX ADMIN — Medication 10 MG: at 22:00

## 2021-01-31 RX ADMIN — Medication 1 CAPSULE: at 22:01

## 2021-01-31 RX ADMIN — ARIPIPRAZOLE 10 MG: 10 TABLET ORAL at 09:10

## 2021-01-31 RX ADMIN — Medication 1 CAPSULE: at 09:11

## 2021-01-31 RX ADMIN — AZATHIOPRINE 100 MG: 50 TABLET ORAL at 09:10

## 2021-01-31 RX ADMIN — TRAZODONE HYDROCHLORIDE 100 MG: 100 TABLET ORAL at 22:01

## 2021-01-31 ASSESSMENT — ACTIVITIES OF DAILY LIVING (ADL)
ORAL_HYGIENE: INDEPENDENT
DRESS: INDEPENDENT
LAUNDRY: WITH SUPERVISION
ORAL_HYGIENE: INDEPENDENT
HYGIENE/GROOMING: INDEPENDENT
LAUNDRY: WITH SUPERVISION
DRESS: INDEPENDENT
HYGIENE/GROOMING: HANDWASHING;INDEPENDENT

## 2021-01-31 ASSESSMENT — MIFFLIN-ST. JEOR: SCORE: 1917.09

## 2021-01-31 NOTE — PLAN OF CARE
Problem: Suicidal Behavior  Goal: Suicidal Behavior is Absent or Managed  Note: Pt had an uneventful shift. Pt mostly withdrawn and isolative to his room, but did attend groups. Pt continues to deny SI, SIB and hallucinations. Pt has been taking his medications and denies side effects. Pt reports sleep has been poor and utilized PRN trazodone and hydroxyzine for sleep. Pt reports appetite has been adequate. Pt denies any other concerns at this time.

## 2021-01-31 NOTE — PLAN OF CARE
"Music Therapy Group note    Total time in session:45 minutes    Number of patients in group: 5    Scope of service: holistic    Patient progress: initial encounter    Patient response/reaction to treatment intervention(s): Stated feeling \"Frustrated about being here\" at group check-in, but engaged in group process and contributed, though his affect was flat.    Other details: Has a preference for indie music and rock.  He selected the song \"The Way it Goes\" by [employer], an emotive, (musically incongruent) upbeat song where the music is upbeat but the words express sadness.      Sarah Miranda, [facility]  Board-Certified Music Therapist           "

## 2021-01-31 NOTE — PLAN OF CARE
"  Problem: Adult Inpatient Plan of Care  Goal: Optimal Comfort and Wellbeing  Outcome: Improving     Problem: Suicidal Behavior  Goal: Suicidal Behavior is Absent or Managed  Outcome: Improving    Pt. was isolative and withdrawn for most of the shift. Pt. spent most of the day in his room and was visible briefly in the milieu during meals and for group. Pt. states that he is doing okay and his goal is to \"do what needs to be done\" to get better. Pt. presents with a flat and blunt affect and has very little interaction or socialization with peers. Pt. denies anxiety, depression, hallucinations, SI, or SIB. Pt. was compliant with scheduled medications.   "

## 2021-02-01 PROCEDURE — 124N000002 HC R&B MH UMMC

## 2021-02-01 PROCEDURE — 250N000013 HC RX MED GY IP 250 OP 250 PS 637: Performed by: PSYCHIATRY & NEUROLOGY

## 2021-02-01 PROCEDURE — G0177 OPPS/PHP; TRAIN & EDUC SERV: HCPCS

## 2021-02-01 PROCEDURE — 99232 SBSQ HOSP IP/OBS MODERATE 35: CPT | Mod: 95 | Performed by: CLINICAL NURSE SPECIALIST

## 2021-02-01 PROCEDURE — 99207 PR NO CHARGE LOS: CPT | Performed by: PHYSICIAN ASSISTANT

## 2021-02-01 PROCEDURE — 90837 PSYTX W PT 60 MINUTES: CPT

## 2021-02-01 PROCEDURE — 250N000012 HC RX MED GY IP 250 OP 636 PS 637: Performed by: PSYCHIATRY & NEUROLOGY

## 2021-02-01 PROCEDURE — 250N000013 HC RX MED GY IP 250 OP 250 PS 637: Performed by: CLINICAL NURSE SPECIALIST

## 2021-02-01 RX ADMIN — LEVOMEFOLATE CALCIUM 7.5 MG TABLET 7.5 MG: TABLET at 10:43

## 2021-02-01 RX ADMIN — TRAZODONE HYDROCHLORIDE 100 MG: 100 TABLET ORAL at 22:04

## 2021-02-01 RX ADMIN — ARIPIPRAZOLE 10 MG: 10 TABLET ORAL at 10:43

## 2021-02-01 RX ADMIN — Medication 10 MG: at 22:04

## 2021-02-01 RX ADMIN — HYDROXYZINE HYDROCHLORIDE 50 MG: 25 TABLET, FILM COATED ORAL at 22:04

## 2021-02-01 RX ADMIN — ACETAMINOPHEN 650 MG: 325 TABLET, FILM COATED ORAL at 21:31

## 2021-02-01 RX ADMIN — Medication 1 CAPSULE: at 20:04

## 2021-02-01 RX ADMIN — Medication 1 CAPSULE: at 10:46

## 2021-02-01 RX ADMIN — AZATHIOPRINE 100 MG: 50 TABLET ORAL at 10:43

## 2021-02-01 ASSESSMENT — ACTIVITIES OF DAILY LIVING (ADL)
HYGIENE/GROOMING: INDEPENDENT
ORAL_HYGIENE: INDEPENDENT
LAUNDRY: WITH SUPERVISION
DRESS: INDEPENDENT
ORAL_HYGIENE: INDEPENDENT
LAUNDRY: WITH SUPERVISION
DRESS: INDEPENDENT
HYGIENE/GROOMING: INDEPENDENT

## 2021-02-01 NOTE — PLAN OF CARE
"Problem: OT General Care Plan  Goal: OT Goal 1  Description: Within 1 week, Pt will demonstrate increased self-esteem as evidenced by >2 self-reported positive affirmations.   Outcome: Improving    Pt actively participated in occupational therapy clinic. During initial check in, pt reports \"feeling frustrated\". Pt identified one success from the weekend: \"playing the heads up game with Yamil\". Pt was able to ask for assistance as needed, and independently initiate self-selected task. Pt demonstrated good focus and planning, spending a significant amount of time planning out talking points and questions for when he meets with the provider. Pt continues to express frustration about his wait in discharge. Pt appeared comfortable interacting with peers.     Pt actively participated in a structured occupational therapy group with a focus on identifying and prioritizing personal values. Pt contributed to a group discussion that facilitated self-reflection and application of personal values. Using a list of values, pt identified their most important values. When prompted to share one takeaway from the group/one value they hope to focus on in the future, pt declined to share as he was in and out of group at various times. Encouraged pt to continue working on this packet after group as it may be a beneficial topic for him given what he has shared with writer about finding his identity, inner self-conflict, and learning what is important to him.   "

## 2021-02-01 NOTE — PLAN OF CARE
Pt attended the structured Therapeutic Recreation group, participating in a group activity. Pt participated in a social engagement activity to gain self-esteem, manage behaviors, improve social skills, decrease isolation, and reduce anxiety/depression.   Pt entered group late, but was able to ease into the activity with the rest of the group. Pt initially didn't want to take any turn, but continue to help give clues for others. Pt eventually took a turn without any extra prompting. At the end of the group, pt was very concerned with being put down for the attendance for the group.

## 2021-02-01 NOTE — PROGRESS NOTES
"Pt.was out and visible in the milieu. He attended and participated in milieu group therapies. Reported he attended the groups \"just to do what I can to get out of this place\". Endorsed feeling \"frustrated\" about hospitalization. Affect blunted and sad.  Withdrawn to self. Denied SI/SIB/hallucination. Also denied depression and anxiety. Herminio does not wish to be dead. Compliant with his scheduled medications. Will continue to monitor.   "

## 2021-02-01 NOTE — PROGRESS NOTES
"Brief Medicine Note    Medicine following for work-up of low ceruloplasmin and low serum copper level.    24-hour urine copper complete but 24-hr urine copper level listed as \"not applicable.\" Discussed with [facility] labs directly, the level of copper in the 24 hour urine sample was so low that they were unable to quantify it. The random copper urine level was <0.1 micrograms/dL.     Discussed with Ophthalmology. A consult was initially placed on 1/26/21 but it was felt that the patient would be unable to tolerate an eye exam due to his psychosis. Ophthalmology recommends outpatient exam if possible, they are unable to complete the full slit lamp examination with gonioscopy as an inpatient. A bedside slit lamp exam may only visualize larger Kayser-fleischer rings but the gonioscopy is necessary to  anything more subtle.       Assessment & Recommendations:   I discussed with Neurology. It is extremely unlikely that this patient has Boyd's disease given the nearly undetectable amount of urine copper excretion.     If the patient develops neurologic signs or symptoms concerning for Boyd's disease (dysarthria, dystonia, tremors, parkinsonism, abnormal drooling, hyperreflexia, myoclonus, etc.) and/or if there is ongoing concern from Psychiatry that patient's psychosis is not due to substance use or a primary psychiatric disorder, then the patient should have an outpatient Ophthalmology examination. Otherwise can hold off on Ophthalmology exam at this time, I have cancelled the consult.     The patient's copper deficiency is most likely related to malabsorption from Crohn's Disease. I would not start supplementation at this time as he is asymptomatic but this is something that should be discussed with his primary GI team or PCP as an outpatient.       Medicine will sign off at this time. Text page sent to Deb Naegele, CNP. Please do not hesitate to contact if any new questions or concerns arise.     Dee " PARK Martin  Hospitalist Service  Page job code [id number] (3B), [phone] (3A), or [phone] ([facility] and 4A)  Text paging via amc is appreciated

## 2021-02-01 NOTE — PROGRESS NOTES
"[facility], [place]   Psychiatric Progress Note        Interim History:   The patient's care was discussed with the treatment team during the daily team meeting and/or staff's chart notes were reviewed.  Staff report patient is visible in the milieu and attends groups.     Psychiatric symptoms and interventions:   Upon interview with patient, is frustrated. Patient is focused on the other patients on the unit. He feels that he is not getting his needs met on the unit because of other patients. Patient reports he feels no difference between Zyprexa and Abilify. Patient appears anxious. He is better at engaging in conversation.     Patient reports he is going to groups as best he can. He cited there is a limit on how many people can attends. He continues to complain about sleep. He reports other patient are slamming doors.     Patient has 1:1 art therapy. Discussed with patient he can start to reframe his perspective on school. He denies suicidal thinking.     Consulted with IM. Boyd's disease was ruled out.          Medications:       adalimumab  40 mg Subcutaneous Weekly     ARIPiprazole  10 mg Oral Daily     azaTHIOprine  100 mg Oral Daily     lactobacillus rhamnosus (GG)  1 capsule Oral BID     melatonin  10 mg Oral At Bedtime     methylfolate  7.5 mg Oral Daily          Allergies:     Allergies   Allergen Reactions     Nuts      All nuts           Labs:   No results found for this or any previous visit (from the past 24 hour(s)).       Psychiatric Examination:     /71   Pulse 81   Temp 97.8  F (36.6  C)   Resp 16   Ht 1.905 m (6' 3\")   Wt 82.1 kg (181 lb 1.6 oz)   SpO2 99%   BMI 22.64 kg/m    Weight is 181 lbs 1.6 oz  Body mass index is 22.64 kg/m .  Orthostatic Vitals       Most Recent      Standing Orthostatic BP 93/56 01/31 0900    Standing Orthostatic Pulse (bpm) 111 01/31 0900        Appearance: awake, alert, adequately groomed and dressed in hospital " scrubs  Attitude:  guarded  Eye Contact:  fair  Mood:  anxious, sad  and depressed  Affect:  intensity is blunted  Speech:  increased speech latency and paucity of speech  Psychomotor Behavior:  no evidence of tardive dyskinesia, dystonia, or tics  Throught Process:  disorganized Patient recognizes when he is tangential and apologizes.   Associations:  no loose associations, paranoia  Thought Content:  Patient denies SI but continues to rumnate about school which has been a trigger for his SI.  Insight:  limited  Judgement:  limited  Oriented to:  time, person, and place  Attention Span and Concentration:  limited  Recent and Remote Memory:  fair    Clinical Global Impressions  First:     Most recent:            Precautions:     Behavioral Orders   Procedures     Code 1 - Restrict to Unit     Discontinue 1:1 attendant for suicide risk     Order Specific Question:   I have performed an in person assessment of the patient     Answer:   Based on this assessment the patient no longer requires a one on one attendant at this point in time.     Order Specific Question:   Rationale     Answer:   Patient States able to remain safe in hospital     Routine Programming     As clinically indicated     Single Room     Status 15     Every 15 minutes.          DIagnoses:   1.  Unspecified psychotic disorder.   2.  Cannabis use disorder, moderate, in recent sustained remission.   3.  Unspecified anxiety disorder with obsessive features.   4.  Nicotine use disorder.          Plan:     Legal status: voluntary      Medication management:   Abilify 10 mg      Medical: Crohn's disease, ruled out Boyd's disease. Ceruloplasmin 14     Behavioral/psychological/social:  Encouraged patient to attend therapeutic hospital programming as tolerated  Transferred from unit 30 to unit 4A on 1/26.  Pharmacy consult  1:1 art therapy  Disposition plan:   Reason for continued hospitalization: Patient is unsafe to return to home due to suicidal  ideation.  Stabilization with medications- pharmacy consult, Rule out Boyd's disease (internal medicine following)  Estimated length of stay: 5-7 days   Return to home with mother and [facility] program      Video-Visit Details     Type of service:  Video Visit     Video Start Time (time video started): 1415     Video End Time (time video stopped): 1430    Originating Location (pt. Location): Other Station 4A     Distant Location (provider location): Provider remote location     Mode of Communication:  Video Conference via [employer]     Physician has received verbal consent for a Video Visit from the patient? Yes

## 2021-02-01 NOTE — PLAN OF CARE
"  Problem: Psychotic Symptoms  Goal: Psychotic Symptoms  Description: Signs and symptoms of listed problems will be absent or manageable.  Note: Pt mostly isolative and withdrawn to his room this evening, but did attend therapeutic group. Pt reports \"I've been better.\" When asked about this, pt makes vague statements about things going on outside of the hospital while he is here. Pt continues to deny SI, SIB and hallucinations and reports still being frustrated about being in the hospital. Pt's speech tangential at times, and pt seems to be worried that he is misrepresenting himself when he speaks with the provider. Writer encouraged pt to write things down that he wanted to discuss with his provider, but he declined. Pt has been taking his medications and denies side effects. Pt reports sleep has been poor, but also states it is \"relative\" despite utilizing PRN trazodone, melatonin and hydroxyzine Pt denies any other concerns at this time.      "

## 2021-02-02 PROCEDURE — 250N000012 HC RX MED GY IP 250 OP 636 PS 637: Performed by: PSYCHIATRY & NEUROLOGY

## 2021-02-02 PROCEDURE — 124N000002 HC R&B MH UMMC

## 2021-02-02 PROCEDURE — 250N000013 HC RX MED GY IP 250 OP 250 PS 637: Performed by: CLINICAL NURSE SPECIALIST

## 2021-02-02 PROCEDURE — 250N000013 HC RX MED GY IP 250 OP 250 PS 637: Performed by: PSYCHIATRY & NEUROLOGY

## 2021-02-02 PROCEDURE — G0177 OPPS/PHP; TRAIN & EDUC SERV: HCPCS

## 2021-02-02 PROCEDURE — 82525 ASSAY OF COPPER: CPT | Performed by: PHYSICIAN ASSISTANT

## 2021-02-02 PROCEDURE — H2032 ACTIVITY THERAPY, PER 15 MIN: HCPCS

## 2021-02-02 PROCEDURE — 36415 COLL VENOUS BLD VENIPUNCTURE: CPT | Performed by: PHYSICIAN ASSISTANT

## 2021-02-02 RX ADMIN — HYDROXYZINE HYDROCHLORIDE 50 MG: 25 TABLET, FILM COATED ORAL at 22:12

## 2021-02-02 RX ADMIN — LEVOMEFOLATE CALCIUM 7.5 MG TABLET 7.5 MG: TABLET at 08:23

## 2021-02-02 RX ADMIN — TRAZODONE HYDROCHLORIDE 100 MG: 100 TABLET ORAL at 22:12

## 2021-02-02 RX ADMIN — AZATHIOPRINE 100 MG: 50 TABLET ORAL at 08:23

## 2021-02-02 RX ADMIN — Medication 10 MG: at 22:12

## 2021-02-02 RX ADMIN — ARIPIPRAZOLE 10 MG: 10 TABLET ORAL at 08:23

## 2021-02-02 RX ADMIN — Medication 1 CAPSULE: at 22:12

## 2021-02-02 RX ADMIN — Medication 1 CAPSULE: at 08:23

## 2021-02-02 ASSESSMENT — ACTIVITIES OF DAILY LIVING (ADL)
LAUNDRY: WITH SUPERVISION
ORAL_HYGIENE: INDEPENDENT
HYGIENE/GROOMING: HANDWASHING;INDEPENDENT;SHOWER
DRESS: INDEPENDENT

## 2021-02-02 ASSESSMENT — MIFFLIN-ST. JEOR: SCORE: 1922.08

## 2021-02-02 NOTE — PLAN OF CARE
"48 Hrs Nursing note:    Patient has been out of his room and visible in the milieu. Patient remains isolative to self and no interaction was observed with peers.   Patient denies SI/SIB, HI and hallucination. Patient endorse depression and anxiety but did not need PRN medications.   Patient affect is flat, blunted. Mood is irritable. Patient was hesitant to take his am medications but eventually took them.   Patient was frustrated with unit routine at night (the status 15 mins check, doors opening and closing, waking him up at 06:30 for blood draw, different nurses giving him medications at different times etc.) Patient was furious and said \"I don't think I am ever going to get well this way.   Patient was reassured that his concerns would be communicated with the care team.     SLEEP: Fair; patient c/o not sleeping well due to interruptions at night.     APPETITE: Good      PRNs: None  "

## 2021-02-02 NOTE — PLAN OF CARE
"Problem: OT General Care Plan  Goal: OT Goal 1  Description: Within 1 week, Pt will demonstrate increased self-esteem as evidenced by >2 self-reported positive affirmations.   Outcome: Improving    Writer checked in with pt this AM, and pt appeared to be in an improved mood. Pt c/o of the unit being loud and there being a lot of people making it difficult to find enough spots to sit in main common areas in addition to his sleep being disrupted due to other patients and the 15 minute checks. Pt expressed concern that he feels he is \"supposed\" to be out in the common area and does want to appear \"isolative\" if he spends some time in his room. Pt consistently attends groups and appears to find some peace and structure in this consistency. Writer validated these concerns and encouraged him to not feel like he has to be out in the milieu at all times and that resting/recharging in his room is valid and understandable. Pt does not appear paranoid but more so annoyed with the varying levels of functioning on the unit. Pt shared that he is normally an outgoing person but feels it is difficult to relate to his peers especially when he notices people \"actively responding to a hallucination\". Pt continues to be more engaged and conversational with writer. Improvement in his processing and responding. Very appreciative of time spent with writer. Writer gave him more topics to work on to help prepare for his readiness to discharge.     Pt actively participated in occupational therapy clinic. During initial checkin, pt reports \"feeling pretty good\" and his intention today is to \"focus on the group itself and following recommendations\". Pt was able to ask for assistance as needed, and independently initiate self-selected task: filling out worksheets. Pt demonstrated good focus. Pt continues to express frustration with not being able to use a mechanical pencil outside of group time - his point being that he \"can't even fill out all " "the worksheets you guys give me because I don't have an eraser\". Pt appeared comfortable interacting with peers.     Pt participated in a structured occupational therapy group with a focus on group discussion surrounding self-care and wellness. Pt independently identified multiple examples of ways they engage in self-care which was broken into categories: physical (eating, sleeping, hygiene, exercise, and relaxation), spiritual, emotional, and social. Pt identified \"avoiding blue light before bed, running, playing music, and scents\" as a personal example of self-care that they currently utilize and \"improving my physical health\" as one way they would like to make their daily routine healthier. Pt demonstrated active listening and shared thoughtful contributions with other group members.         "

## 2021-02-02 NOTE — PROGRESS NOTES
Work Completed: Reviewed charting and coverage notes from yesterday.     Discharge plan or goal: Home with [facility] intake on 2/16.                Barriers to discharge: symptom management.

## 2021-02-02 NOTE — PROGRESS NOTES
" met with pt for 1:1 therapy session.  He didn't appear paranoid in the conversation until the end of it when he is worried about staff watching him when we spoke about that we chart progress. He is most worried about how long his stay is and worried that he didn't communicate with practitioner that he is getting better.  was working with him on goal setting in manageable amts so he doesn't overwhelm himself. He is worried about reentering college. He understands he is on a new medication Abilify and that he is starting the [facility] program. He seems overwhelmed by the amount of peers on the unit and perseverating that he is not \"like them.\" He also talked about it being hard to sleep because of the noise on the unit.      was working with him on some DBT skills and gave him some worksheets to keep him engaged and regulate his feelings of overwhelm with getting his life back on track and not going directly to , but to work with some skills to meet these challenges.     He was pleasant and cooperative and agreed he would meet again on Thursday if he is still on the unit.  "

## 2021-02-02 NOTE — PLAN OF CARE
Patient isolative to room part of the shift, sat in the lounge the rest of the evening. Patient affect flat, blunted. Mood is anxious. Patient denies SI, SIB, HI, or hallucinations. Patient VSS. Patient showered this evening, appetite fair. Patient had 1:1 session with art therapist. Patient reported a headache, was given tylenol. Patient given prn trazodone 100 mg and prn hydroxyzine 50 mg for sleep and anxiety. Patient denies any additional concerns.

## 2021-02-02 NOTE — PROGRESS NOTES
"Behavioral Health  Note    Behavioral Health  Spirituality Group Note    UNIT 4A [place]    Name: Julian Berg YOB: 2000   MRN: [id number] Age: 20 year old      Patient attended -led group, which included discussion of spirituality, coping with illness and building resilience.    Patient attended group for 1.0 hrs.    The patient actively participated in group discussion    Herminio spoke to feeling \"frustrated\" this morning - but engaged willingly and respectfully with the group discussion on compassion. He was able to speak to personal experiences that related to the topic--specifically regarding learnings from navigating roommate relationships.      Katharine Dominguez  Chaplain Resident  Pager: [phone]   "

## 2021-02-03 PROCEDURE — 99232 SBSQ HOSP IP/OBS MODERATE 35: CPT | Mod: 95 | Performed by: CLINICAL NURSE SPECIALIST

## 2021-02-03 PROCEDURE — 250N000012 HC RX MED GY IP 250 OP 636 PS 637: Performed by: PSYCHIATRY & NEUROLOGY

## 2021-02-03 PROCEDURE — G0177 OPPS/PHP; TRAIN & EDUC SERV: HCPCS

## 2021-02-03 PROCEDURE — 124N000002 HC R&B MH UMMC

## 2021-02-03 PROCEDURE — 250N000013 HC RX MED GY IP 250 OP 250 PS 637: Performed by: PSYCHIATRY & NEUROLOGY

## 2021-02-03 PROCEDURE — 250N000013 HC RX MED GY IP 250 OP 250 PS 637: Performed by: CLINICAL NURSE SPECIALIST

## 2021-02-03 RX ORDER — PHENOL 1.4 %
10 AEROSOL, SPRAY (ML) MUCOUS MEMBRANE AT BEDTIME
Qty: 30 TABLET | Refills: 1 | Status: SHIPPED | OUTPATIENT
Start: 2021-02-03

## 2021-02-03 RX ORDER — ARIPIPRAZOLE 10 MG/1
10 TABLET ORAL DAILY
Qty: 30 TABLET | Refills: 1 | Status: SHIPPED | OUTPATIENT
Start: 2021-02-04 | End: 2021-02-04

## 2021-02-03 RX ORDER — TRAZODONE HYDROCHLORIDE 100 MG/1
100 TABLET ORAL
Qty: 30 TABLET | Refills: 1 | Status: SHIPPED | OUTPATIENT
Start: 2021-02-03

## 2021-02-03 RX ADMIN — LEVOMEFOLATE CALCIUM 7.5 MG TABLET 7.5 MG: TABLET at 08:12

## 2021-02-03 RX ADMIN — TRAZODONE HYDROCHLORIDE 100 MG: 100 TABLET ORAL at 21:05

## 2021-02-03 RX ADMIN — ARIPIPRAZOLE 10 MG: 10 TABLET ORAL at 08:12

## 2021-02-03 RX ADMIN — Medication 1 CAPSULE: at 08:12

## 2021-02-03 RX ADMIN — Medication 10 MG: at 21:05

## 2021-02-03 RX ADMIN — AZATHIOPRINE 100 MG: 50 TABLET ORAL at 08:12

## 2021-02-03 RX ADMIN — Medication 1 CAPSULE: at 21:05

## 2021-02-03 ASSESSMENT — ACTIVITIES OF DAILY LIVING (ADL)
HYGIENE/GROOMING: INDEPENDENT;HANDWASHING
ORAL_HYGIENE: INDEPENDENT
LAUNDRY: WITH SUPERVISION
DRESS: INDEPENDENT

## 2021-02-03 NOTE — PLAN OF CARE
"Problem: OT General Care Plan  Goal: OT Goal 1  Description: Within 1 week, Pt will demonstrate increased self-esteem as evidenced by >2 self-reported positive affirmations.   Outcome: Improving    Pt actively participated in a structured occupational therapy group with a focus on self-reflection via journal prompts. During initial check in, pt reports \"feeling good for the most part, but frustrated in some ways\". Pt was offered journal pages with topics including planning, reflecting, positivity/optimism, learning, productivity, social supports, goals for the future, laughter, and asking for help. Pt constructed a personal journal using minimal materials. Pt demonstrated good planning and task organization.  Pt prioritized topics that they are interested in, and focused on the writing task for the full duration of group. Pt was invited to share information or reflections, and chose to share \"I haven't journaled much in the past, but I may be interested in exploring it\". Minimally social with others throughout duration of group.    At the beginning of group, writer offered time for patients to debrief the eventful evening last night and share any concerns they may have, and validate any emotions they may be experiencing due to the events. Pt inquiring about details of the situation and writer reminded pt that for privacy reasons and relevancy, details aren't necessary to discuss, but writer validated his concerns regarding the noise, disruptions, and difficulties sleeping due to this.    "

## 2021-02-03 NOTE — PROGRESS NOTES
Patient had a good night sleep though part of his sleep was interrupted by disruptions from another patient. Slept total of 6.25 hours.

## 2021-02-03 NOTE — PLAN OF CARE
Pt had a lot of questions about discharge and his mother has called a few times this morning. The provider was informed.  Pt feels that he has been here on 4a to long and is ready to start the navigate program. He attempts to rationalize his need to be released but still doesn't appear to track well. Pt goes on long tangents on certain issues and problems but it's generally the same issues. Pt presents at his baseline.  He denies SI.

## 2021-02-03 NOTE — PROGRESS NOTES
"[facility], [place]   Psychiatric Progress Note        Interim History:   The patient's care was discussed with the treatment team during the daily team meeting and/or staff's chart notes were reviewed.  Staff report patient is visible in the milieu and attends groups. Patient reports feeling uncomfortable with \"all the people on the unit.\"    Psychiatric symptoms and interventions:   Upon interview with patient, is more engaged in the conversation. He is better able to express his thoughts. He continues to have lingering paranoia. He is worried that staff is checking on him.   He is less irritable. Patient has been cooperative with taking his medications.     He lacks insight into his mental health and reason for taking medications.     Discussed treatment, medications with mother. Explained to mother Boyd's disease was ruled out.          Medications:       adalimumab  40 mg Subcutaneous Weekly     ARIPiprazole  10 mg Oral Daily     azaTHIOprine  100 mg Oral Daily     lactobacillus rhamnosus (GG)  1 capsule Oral BID     melatonin  10 mg Oral At Bedtime     methylfolate  7.5 mg Oral Daily          Allergies:     Allergies   Allergen Reactions     Nuts      All nuts           Labs:   No results found for this or any previous visit (from the past 24 hour(s)).       Psychiatric Examination:     /75   Pulse 72   Temp 97.5  F (36.4  C) (Oral)   Resp 16   Ht 1.905 m (6' 3\")   Wt 82.6 kg (182 lb 3.2 oz)   SpO2 96%   BMI 22.77 kg/m    Weight is 182 lbs 3.2 oz  Body mass index is 22.77 kg/m .  Orthostatic Vitals       Most Recent      Standing Orthostatic /64 02/02 0955            Appearance: awake, alert and adequately groomed  Attitude:  cooperative  Eye Contact:  fair  Mood:  anxious  Affect:  intensity is blunted  Speech:  normal prosody  Psychomotor Behavior:  no evidence of tardive dyskinesia, dystonia, or tics  Throught Process:  goal oriented  Associations:  " lingering paranoia  Thought Content:  no evidence of suicidal ideation or homicidal ideation, no auditory hallucinations present and no visual hallucinations present  Insight:  limited  Judgement:  limited  Oriented to:  time, person, and place  Attention Span and Concentration:  intact  Recent and Remote Memory:  intact    Clinical Global Impressions  First:     Most recent:            Precautions:     Behavioral Orders   Procedures     Code 1 - Restrict to Unit     Discontinue 1:1 attendant for suicide risk     Order Specific Question:   I have performed an in person assessment of the patient     Answer:   Based on this assessment the patient no longer requires a one on one attendant at this point in time.     Order Specific Question:   Rationale     Answer:   Patient States able to remain safe in hospital     Routine Programming     As clinically indicated     Single Room     Status 15     Every 15 minutes.          DIagnoses:   1.  Unspecified psychotic disorder.   2.  Cannabis use disorder, moderate, in recent sustained remission.   3.  Unspecified anxiety disorder with obsessive features.   4.  Nicotine use disorder.          Plan:     Legal status: voluntary      Medication management:   Abilify 10 mg      Medical: Crohn's disease, ruled out Boyd's disease. Ceruloplasmin 14     Behavioral/psychological/social:  Encouraged patient to attend therapeutic hospital programming as tolerated  Transferred from unit 30 to unit 4A on 1/26.  Pharmacy consult  1:1 art therapy  Single room  Disposition plan:   Reason for continued hospitalization: Patient is unsafe to return to home due to suicidal ideation.  Stabilization with medications- pharmacy consult, Rule out Boyd's disease (internal medicine following)  Patient will discharge on Friday, meds ordered.    Return to home with mother and [facility] program   Mother is putting in a security system.      Video-Visit Details     Type of service:  Video Visit     Video  Start Time (time video started): 1115     Video End Time (time video stopped): 1126    Originating Location (pt. Location): Other Station 4A     Distant Location (provider location): Provider remote location     Mode of Communication:  Video Conference via [employer]     Physician has received verbal consent for a Video Visit from the patient? Yes

## 2021-02-03 NOTE — PROGRESS NOTES
Pt participated in dance/movement therapy (DMT) with stretch-based movement with emotional expressions identified connected to those stretches.      Pt shared he does not feel this type of group is serving his mental health needs, however, he feels he must participate in groups to demonstrate his commitment to his treatment program.  This therapist does not feel this pt needs DMT-specific group for his treatment program.  Recommend more verbal-based psychotherapies as more suitable for pt.       02/02/21 5220   Dance Movement Therapy   Type of Intervention structured groups   Response participates with encouragement   Hours 1

## 2021-02-03 NOTE — PLAN OF CARE
"  Problem: Suicidal Behavior  Goal: Suicidal Behavior is Absent or Managed  Note: Pt had an uneventful shift. He has been withdrawn, but visible in the milieu. Pt denies SI, SIB and hallucinations. Pt reports some stress around two code events this evening and asks \"Is anything going to be done about those situations?\" Pt states that these behaviors are very disruptive to him and the rest of the milieu. Pt has been taking his medications and denies side effects. Pt reports sleep has been poor despite PRNs and writer offered ear plugs this evening, which pt accepted. Pt reports appetite is adequate. Pt denies any other concerns at this time.      "

## 2021-02-04 LAB
ALBUMIN SERPL-MCNC: 4.2 G/DL (ref 3.4–5)
ALP SERPL-CCNC: 44 U/L (ref 40–150)
ALT SERPL W P-5'-P-CCNC: 28 U/L (ref 0–70)
ANION GAP SERPL CALCULATED.3IONS-SCNC: 4 MMOL/L (ref 3–14)
AST SERPL W P-5'-P-CCNC: 20 U/L (ref 0–45)
BASOPHILS # BLD AUTO: 0.1 10E9/L (ref 0–0.2)
BASOPHILS NFR BLD AUTO: 1.1 %
BILIRUB SERPL-MCNC: 0.6 MG/DL (ref 0.2–1.3)
BUN SERPL-MCNC: 15 MG/DL (ref 7–30)
CALCIUM SERPL-MCNC: 9.5 MG/DL (ref 8.5–10.1)
CHLORIDE SERPL-SCNC: 105 MMOL/L (ref 94–109)
CO2 SERPL-SCNC: 30 MMOL/L (ref 20–32)
COPPER SERPL-MCNC: 65.1 UG/DL (ref 70–140)
CREAT SERPL-MCNC: 0.96 MG/DL (ref 0.66–1.25)
DIFFERENTIAL METHOD BLD: NORMAL
EOSINOPHIL # BLD AUTO: 0.3 10E9/L (ref 0–0.7)
EOSINOPHIL NFR BLD AUTO: 5.5 %
ERYTHROCYTE [DISTWIDTH] IN BLOOD BY AUTOMATED COUNT: 12.2 % (ref 10–15)
GFR SERPL CREATININE-BSD FRML MDRD: >90 ML/MIN/{1.73_M2}
GLUCOSE SERPL-MCNC: 91 MG/DL (ref 70–99)
HCT VFR BLD AUTO: 47.8 % (ref 40–53)
HGB BLD-MCNC: 15.8 G/DL (ref 13.3–17.7)
IMM GRANULOCYTES # BLD: 0 10E9/L (ref 0–0.4)
IMM GRANULOCYTES NFR BLD: 0.4 %
LYMPHOCYTES # BLD AUTO: 1.6 10E9/L (ref 0.8–5.3)
LYMPHOCYTES NFR BLD AUTO: 36.4 %
MCH RBC QN AUTO: 32.2 PG (ref 26.5–33)
MCHC RBC AUTO-ENTMCNC: 33.1 G/DL (ref 31.5–36.5)
MCV RBC AUTO: 98 FL (ref 78–100)
MONOCYTES # BLD AUTO: 0.4 10E9/L (ref 0–1.3)
MONOCYTES NFR BLD AUTO: 9.8 %
NEUTROPHILS # BLD AUTO: 2.1 10E9/L (ref 1.6–8.3)
NEUTROPHILS NFR BLD AUTO: 46.8 %
NRBC # BLD AUTO: 0 10*3/UL
NRBC BLD AUTO-RTO: 0 /100
PLATELET # BLD AUTO: 217 10E9/L (ref 150–450)
POTASSIUM SERPL-SCNC: 4.3 MMOL/L (ref 3.4–5.3)
PROT SERPL-MCNC: 7.7 G/DL (ref 6.8–8.8)
RBC # BLD AUTO: 4.9 10E12/L (ref 4.4–5.9)
SODIUM SERPL-SCNC: 139 MMOL/L (ref 133–144)
WBC # BLD AUTO: 4.5 10E9/L (ref 4–11)

## 2021-02-04 PROCEDURE — 85025 COMPLETE CBC W/AUTO DIFF WBC: CPT | Performed by: CLINICAL NURSE SPECIALIST

## 2021-02-04 PROCEDURE — 124N000002 HC R&B MH UMMC

## 2021-02-04 PROCEDURE — 99232 SBSQ HOSP IP/OBS MODERATE 35: CPT | Mod: 95 | Performed by: CLINICAL NURSE SPECIALIST

## 2021-02-04 PROCEDURE — 90832 PSYTX W PT 30 MINUTES: CPT

## 2021-02-04 PROCEDURE — 80053 COMPREHEN METABOLIC PANEL: CPT | Performed by: CLINICAL NURSE SPECIALIST

## 2021-02-04 PROCEDURE — 36415 COLL VENOUS BLD VENIPUNCTURE: CPT | Performed by: CLINICAL NURSE SPECIALIST

## 2021-02-04 PROCEDURE — G0177 OPPS/PHP; TRAIN & EDUC SERV: HCPCS

## 2021-02-04 PROCEDURE — 250N000013 HC RX MED GY IP 250 OP 250 PS 637: Performed by: CLINICAL NURSE SPECIALIST

## 2021-02-04 PROCEDURE — 250N000012 HC RX MED GY IP 250 OP 636 PS 637: Performed by: PSYCHIATRY & NEUROLOGY

## 2021-02-04 PROCEDURE — 250N000013 HC RX MED GY IP 250 OP 250 PS 637: Performed by: PSYCHIATRY & NEUROLOGY

## 2021-02-04 RX ORDER — ARIPIPRAZOLE 5 MG/1
5 TABLET ORAL DAILY
Status: DISCONTINUED | OUTPATIENT
Start: 2021-02-05 | End: 2021-02-05 | Stop reason: HOSPADM

## 2021-02-04 RX ORDER — PROPRANOLOL HYDROCHLORIDE 10 MG/1
10 TABLET ORAL 2 TIMES DAILY PRN
Qty: 60 TABLET | Refills: 0 | Status: SHIPPED | OUTPATIENT
Start: 2021-02-04

## 2021-02-04 RX ORDER — ARIPIPRAZOLE 5 MG/1
5 TABLET ORAL DAILY
Qty: 30 TABLET | Refills: 0 | Status: SHIPPED | OUTPATIENT
Start: 2021-02-05

## 2021-02-04 RX ORDER — PROPRANOLOL HYDROCHLORIDE 10 MG/1
10 TABLET ORAL 2 TIMES DAILY PRN
Status: DISCONTINUED | OUTPATIENT
Start: 2021-02-04 | End: 2021-02-05 | Stop reason: HOSPADM

## 2021-02-04 RX ORDER — PROPRANOLOL HYDROCHLORIDE 10 MG/1
10 TABLET ORAL 2 TIMES DAILY
Status: DISCONTINUED | OUTPATIENT
Start: 2021-02-04 | End: 2021-02-04

## 2021-02-04 RX ADMIN — ARIPIPRAZOLE 10 MG: 10 TABLET ORAL at 08:41

## 2021-02-04 RX ADMIN — Medication 10 MG: at 21:21

## 2021-02-04 RX ADMIN — TRAZODONE HYDROCHLORIDE 100 MG: 100 TABLET ORAL at 21:21

## 2021-02-04 RX ADMIN — Medication 1 CAPSULE: at 21:21

## 2021-02-04 RX ADMIN — PROPRANOLOL HYDROCHLORIDE 10 MG: 10 TABLET ORAL at 21:21

## 2021-02-04 RX ADMIN — AZATHIOPRINE 100 MG: 50 TABLET ORAL at 08:41

## 2021-02-04 RX ADMIN — Medication 1 CAPSULE: at 08:41

## 2021-02-04 RX ADMIN — LEVOMEFOLATE CALCIUM 7.5 MG TABLET 7.5 MG: TABLET at 08:41

## 2021-02-04 ASSESSMENT — ACTIVITIES OF DAILY LIVING (ADL)
DRESS: INDEPENDENT
ORAL_HYGIENE: INDEPENDENT
LAUNDRY: WITH SUPERVISION
HYGIENE/GROOMING: HANDWASHING;SHOWER;INDEPENDENT

## 2021-02-04 ASSESSMENT — MIFFLIN-ST. JEOR: SCORE: 1898.5

## 2021-02-04 NOTE — PLAN OF CARE
Problem: Psychotic Symptoms  Goal: Psychotic Symptoms  Description: Signs and symptoms of listed problems will be absent or manageable.  Note: Pt had an unremarkable shift. He has been withdrawn for most of the shift, but did attend therapeutic group. Pt often overheard on the phone having tense conversations with the individual on the other end. Pt denies SI, SIB and hallucinations. Pt has been taking his medications and denies side effects. Pt utilizing trazodone for sleep. Pt reports appetite has been adequate. Pt denies any other concerns at this time.

## 2021-02-04 NOTE — PROGRESS NOTES
[facility], [place]   Psychiatric Progress Note        Interim History:   The patient's care was discussed with the treatment team during the daily team meeting and/or staff's chart notes were reviewed.  Staff report patient is visible in the milieu and attends groups.     Psychiatric symptoms and interventions:   Upon interview with patient, is looking forward to discharge tomorrow. He continues to lack insight into his mental health. He reported akathisia. Started Inderal PRN. Reduced dose of Abilify due to drop in WBC. Patient is also taking Humira which can drop WBC.     Provider discussed changes in medications with mother. She will  patient tomorrow at 1:00 pm.         Medications:       adalimumab  40 mg Subcutaneous Weekly     ARIPiprazole  10 mg Oral Daily     azaTHIOprine  100 mg Oral Daily     lactobacillus rhamnosus (GG)  1 capsule Oral BID     melatonin  10 mg Oral At Bedtime     methylfolate  7.5 mg Oral Daily          Allergies:     Allergies   Allergen Reactions     Nuts      All nuts           Labs:     Recent Results (from the past 24 hour(s))   Comprehensive metabolic panel    Collection Time: 02/04/21  7:39 AM   Result Value Ref Range    Sodium 139 133 - 144 mmol/L    Potassium 4.3 3.4 - 5.3 mmol/L    Chloride 105 94 - 109 mmol/L    Carbon Dioxide 30 20 - 32 mmol/L    Anion Gap 4 3 - 14 mmol/L    Glucose 91 70 - 99 mg/dL    Urea Nitrogen 15 7 - 30 mg/dL    Creatinine 0.96 0.66 - 1.25 mg/dL    GFR Estimate >90 >60 mL/min/[1.73_m2]    GFR Estimate If Black >90 >60 mL/min/[1.73_m2]    Calcium 9.5 8.5 - 10.1 mg/dL    Bilirubin Total 0.6 0.2 - 1.3 mg/dL    Albumin 4.2 3.4 - 5.0 g/dL    Protein Total 7.7 6.8 - 8.8 g/dL    Alkaline Phosphatase 44 40 - 150 U/L    ALT 28 0 - 70 U/L    AST 20 0 - 45 U/L   CBC with platelets differential    Collection Time: 02/04/21  7:39 AM   Result Value Ref Range    WBC 4.5 4.0 - 11.0 10e9/L    RBC Count 4.90 4.4 - 5.9 10e12/L     "Hemoglobin 15.8 13.3 - 17.7 g/dL    Hematocrit 47.8 40.0 - 53.0 %    MCV 98 78 - 100 fl    MCH 32.2 26.5 - 33.0 pg    MCHC 33.1 31.5 - 36.5 g/dL    RDW 12.2 10.0 - 15.0 %    Platelet Count 217 150 - 450 10e9/L    Diff Method Automated Method     % Neutrophils 46.8 %    % Lymphocytes 36.4 %    % Monocytes 9.8 %    % Eosinophils 5.5 %    % Basophils 1.1 %    % Immature Granulocytes 0.4 %    Nucleated RBCs 0 0 /100    Absolute Neutrophil 2.1 1.6 - 8.3 10e9/L    Absolute Lymphocytes 1.6 0.8 - 5.3 10e9/L    Absolute Monocytes 0.4 0.0 - 1.3 10e9/L    Absolute Eosinophils 0.3 0.0 - 0.7 10e9/L    Absolute Basophils 0.1 0.0 - 0.2 10e9/L    Abs Immature Granulocytes 0.0 0 - 0.4 10e9/L    Absolute Nucleated RBC 0.0           Psychiatric Examination:     /67   Pulse 82   Temp 97.9  F (36.6  C) (Oral)   Resp 16   Ht 1.905 m (6' 3\")   Wt 82.6 kg (182 lb 3.2 oz)   SpO2 96%   BMI 22.77 kg/m    Weight is 182 lbs 3.2 oz  Body mass index is 22.77 kg/m .  Orthostatic Vitals       Most Recent      Standing Orthostatic /66 02/03 0900    Standing Orthostatic Pulse (bpm) 97 02/03 0900          Appearance: awake, alert and adequately groomed  Attitude:  cooperative  Eye Contact:  fair  Mood:  anxious  Affect:  intensity is blunted  Speech:  normal prosody  Psychomotor Behavior:  no evidence of tardive dyskinesia, dystonia, or tics  Throught Process:  goal oriented  Associations:  lingering paranoia  Thought Content:  no evidence of suicidal ideation or homicidal ideation, no auditory hallucinations present and no visual hallucinations present  Insight:  limited  Judgement:  limited  Oriented to:  time, person, and place  Attention Span and Concentration:  intact  Recent and Remote Memory:  intact     Clinical Global Impressions  First:     Most recent:            Precautions:     Behavioral Orders   Procedures     Code 1 - Restrict to Unit     Discontinue 1:1 attendant for suicide risk     Order Specific Question:   I " have performed an in person assessment of the patient     Answer:   Based on this assessment the patient no longer requires a one on one attendant at this point in time.     Order Specific Question:   Rationale     Answer:   Patient States able to remain safe in hospital     Routine Programming     As clinically indicated     Single Room     Status 15     Every 15 minutes.          DIagnoses:   1.  Unspecified psychotic disorder.   2.  Cannabis use disorder, moderate, in recent sustained remission.   3.  Unspecified anxiety disorder with obsessive features.   4.  Nicotine use disorder.          Plan:      Legal status: voluntary      Medication management:   Abilify 5 mg      Medical: Crohn's disease, ruled out Boyd's disease.      Behavioral/psychological/social:  Encouraged patient to attend therapeutic hospital programming as tolerated  Transferred from unit 30 to unit 4A on 1/26.  Pharmacy consult  1:1 art therapy  Single room  Disposition plan:   Reason for continued hospitalization: Patient is unsafe to return to home due to suicidal ideation.  Stabilization with medications- pharmacy consult, Rule out Boyd's disease (internal medicine following)  Patient will discharge on Friday, meds ordered.    Return to home with mother and Navigate program   Mother is putting in a security system.      Video-Visit Details     Type of service:  Video Visit     Video Start Time (time video started): 1031     Video End Time (time video stopped): 1042    Originating Location (pt. Location): Other Station 4A     Distant Location (provider location): Provider remote location     Mode of Communication:  Video Conference via [employer]     Physician has received verbal consent for a Video Visit from the patient? Yes

## 2021-02-04 NOTE — DISCHARGE SUMMARY
"Psychiatric Discharge Summary    Julian Berg MRN# [id number]   Age: 20 year old YOB: 2000     Date of Admission:  1/25/2021  Date of Discharge:  2/4/2021  Admitting Physician:  Farhan Castillo MD  Discharge Physician:  Debra A. Naegele, APRN CNS (Contact: [phone])         Event Leading to Hospitalization:   Julian Berg is a 20-year-old single  male with a history of depression, unspecified psychosis, general anxiety disorder, polysubstance abuse and is presenting with suicidal ideation.  The patient was recently discharged from [place] on 01/22.  He was discharged with Zyprexa 10 mg.  The patient had psychological testing.  The patient's discharge plan was to work with the [facility] Program.  When patient returned to home with mother patient made multiple paranoid statements.  Mother is reporting that patient made multiple suicide statements saying that \"he couldn't take it any longer, the way his life was.\"  The patient was feeling hopeless and helpless.  He was pacing in the house.  The patient was saying that he wanted to die.  Mother called [facility] and eventually 911.  The patient's goal for this hospitalization is medication adjustment and working with the [facility] team       See Admission note by Naegele, Debra Ann, APRN CNS on 1/26/2021 for additional details.          DIagnoses:   1.  Unspecified psychotic disorder.   2.  Cannabis use disorder, moderate, in recent sustained remission.   3.  Unspecified anxiety disorder with obsessive features.   4.  Nicotine use disorder.          Labs:     Results for orders placed or performed during the hospital encounter of 01/25/21   Drug abuse screen 6 urine (tox)     Status: None   Result Value Ref Range    Amphetamine Qual Urine Negative NEG^Negative    Barbiturates Qual Urine Negative NEG^Negative    Benzodiazepine Qual Urine Negative NEG^Negative    Cannabinoids Qual Urine Negative NEG^Negative    Cocaine Qual Urine Negative " NEG^Negative    Ethanol Qual Urine Negative NEG^Negative    Opiates Qualitative Urine Negative NEG^Negative   Asymptomatic SARS-CoV-2 COVID-19 Virus (Coronavirus) by PCR     Status: None    Specimen: Nasopharyngeal   Result Value Ref Range    SARS-CoV-2 Virus Specimen Source Nasopharyngeal     SARS-CoV-2 PCR Result NEGATIVE     SARS-CoV-2 PCR Comment       Testing was performed using the Xpert Xpress SARS-CoV-2 Assay on the Cepheid Gene-Xpert   Instrument Systems. Additional information about this Emergency Use Authorization (EUA)   assay can be found via the Lab Guide.     Copper level     Status: Abnormal   Result Value Ref Range    Copper 53.8 (L) 70.0 - 140.0 ug/dL   Copper urine     Status: Abnormal   Result Value Ref Range    Copper Duration Urine 24 hr    Volume 3,700 mL    Copper Random Urine <1.0 (L) 0.3 - 3.2 ug/dL    Copper 24 h Urine Not Applicable 3.0 - 45.0 ug/d    Copper Urine ug/g Cr Not Applicable 10.0 - 45.0 ug/g CRT    Copper Creatinine Quant Urine 53 mg/dL    Creatinine 24 Hr Urine 1,961 1,000 - 2,500 mg/d   Copper level     Status: Abnormal   Result Value Ref Range    Copper 65.1 (L) 70.0 - 140.0 ug/dL   Comprehensive metabolic panel     Status: None   Result Value Ref Range    Sodium 139 133 - 144 mmol/L    Potassium 4.3 3.4 - 5.3 mmol/L    Chloride 105 94 - 109 mmol/L    Carbon Dioxide 30 20 - 32 mmol/L    Anion Gap 4 3 - 14 mmol/L    Glucose 91 70 - 99 mg/dL    Urea Nitrogen 15 7 - 30 mg/dL    Creatinine 0.96 0.66 - 1.25 mg/dL    GFR Estimate >90 >60 mL/min/[1.73_m2]    GFR Estimate If Black >90 >60 mL/min/[1.73_m2]    Calcium 9.5 8.5 - 10.1 mg/dL    Bilirubin Total 0.6 0.2 - 1.3 mg/dL    Albumin 4.2 3.4 - 5.0 g/dL    Protein Total 7.7 6.8 - 8.8 g/dL    Alkaline Phosphatase 44 40 - 150 U/L    ALT 28 0 - 70 U/L    AST 20 0 - 45 U/L   CBC with platelets differential     Status: None   Result Value Ref Range    WBC 4.5 4.0 - 11.0 10e9/L    RBC Count 4.90 4.4 - 5.9 10e12/L    Hemoglobin 15.8 13.3  - 17.7 g/dL    Hematocrit 47.8 40.0 - 53.0 %    MCV 98 78 - 100 fl    MCH 32.2 26.5 - 33.0 pg    MCHC 33.1 31.5 - 36.5 g/dL    RDW 12.2 10.0 - 15.0 %    Platelet Count 217 150 - 450 10e9/L    Diff Method Automated Method     % Neutrophils 46.8 %    % Lymphocytes 36.4 %    % Monocytes 9.8 %    % Eosinophils 5.5 %    % Basophils 1.1 %    % Immature Granulocytes 0.4 %    Nucleated RBCs 0 0 /100    Absolute Neutrophil 2.1 1.6 - 8.3 10e9/L    Absolute Lymphocytes 1.6 0.8 - 5.3 10e9/L    Absolute Monocytes 0.4 0.0 - 1.3 10e9/L    Absolute Eosinophils 0.3 0.0 - 0.7 10e9/L    Absolute Basophils 0.1 0.0 - 0.2 10e9/L    Abs Immature Granulocytes 0.0 0 - 0.4 10e9/L    Absolute Nucleated RBC 0.0    Internal Medicine Adult IP Consult for [facility]: Patient to be seen: Routine within 24 hrs; Call back #: [phone]; work up for Boyd's disease; Consultant may enter orders: Yes; Requesting provider? Hospitalist (if differ...     Status: None ()    Zena Gan PA-C     1/26/2021  1:41 PM  [facility]   Consult Note - Hospitalist Service     Date of Admission:  1/25/2021  Consult Requested by: Debra Naegele, APRN, CNP  Reason for Consult: Low ceruloplasmin, concern for Boyd's   disease    Assessment & Plan   Julian Berg is a 20 year old male admitted on 1/25/2021. He   has a past medical history significant for Crohn's colitis,   depression, MARTA, and substance abuse who is admitted to station   4A for suicidal ideation. He had a recent admission 01/14-01/23   for possible psychosis and suicidal ideation, but continued to   feel hopeless/helpless and frustrated at home.    Low ceruloplasmin  New onset psychosis  Ceruloplasmin 14 in setting of recent new onset psychosis. LFTs,   CBC, VitB12, TSH wnl. HIV, Lymes negative. Utox negative on   admission. Recent MRI without acute pathology. Concern for   possible Boyd's disease given psychiatric  symptoms in setting   of low ceruloplasmin level. No significant neuro exam findings   consistent with Boyd's. DDx of low ceruloplasmin which are less   likely at this time include nephrotic syndrome, protein-losing   gastropathy or copper deficiency.  - 24 hour urine copper, serum copper level  - Optho consulted for evaluated for Kayser-Fleischer rings    Medicine will continue to follow for results of pending work-up   and follow-up of Optho consult. Please reach out with any further   questions or concerns.    Zena Mcdonald PA-C  [facility]     __________________________________________________________________  ____    Chief Complaint   Suicidal ideation    History is obtained from the patient and electronic health record    History of Present Illness   Julian Berg is a 20 year old male who has a past medical   history significant for Crohn's colitis, depression, MARTA, and   substance abuse.    In review of recent H&P from 1/15; patient reportedly put all of   his money into Bitcoin including his college funds. Noted to have   some though blocking and difficulty answering questions at that   time as well. Has some history of substance abuse including daily   TCH via vaping, alcohol use, crushing/snorting adderall as well   as using acid in high school. He was discharged to home on 1/22   and readmitted 1/25  following concerns for ongoing suicidal   thoughts and ideations. He believes this is all a   misunderstanding and should not be admitted currently. Writer   addressed my role in assisting with ruling out other etiologies   of new psychosis and he felt this was too much work-up.    He does occasionally have a tremor but is unable to provide me   many details in regards to this. Attributes many of his symptoms   such as difficulty with speech and tremor to psychiatric   medications. Does not note any involuntary body movements.    He has a history of  crohns for which he is on humira and   azathioprine. His symptoms have been well-controlled. His weight   has been stable. Denies blood or mucus in his stools. Only having   1-2 BMs daily. Follows outpatient with [facility] but is unable to   recall providers name or when he last saw. Unsure if his Crohn's   is limited to colon or if he had small bowel involvement as well.       Review of Systems   The 10 point Review of Systems is negative other than noted in   the HPI or here.     Past Medical History    I have reviewed this patient's medical history and updated it   with pertinent information if needed.   Past Medical History:   Diagnosis Date     Colitis        Past Surgical History   I have reviewed this patient's surgical history and updated it   with pertinent information if needed.  Past Surgical History:   Procedure Laterality Date     GENITOURINARY SURGERY         Social History   I have reviewed this patient's social history and updated it with   pertinent information if needed.  Social History     Tobacco Use     Smoking status: Passive Smoke Exposure - Never Smoker     Smokeless tobacco: Former User   Substance Use Topics     Alcohol use: Yes     Comment: occasional     Drug use: No       Family History   I have reviewed this patient's family history and updated it with   pertinent information if needed.   History reviewed. No pertinent family history.    Medications   I have reviewed this patient's current medications    Allergies   Allergies   Allergen Reactions     Nuts      All nuts        Physical Exam   Vital Signs: Temp: 97.2  F (36.2  C) Temp src: Oral BP: 131/72   Pulse: 89   Resp: 16 SpO2: 98 % O2 Device: None (Room air)    Weight: 183 lbs 0 oz    General Appearance: Awake, alert and oriented. No acute distress.  Eyes: Normal lids. Anicteric sclera. PERRL. EOMs intact. No   nystagmus.  HEENT: Normocephalic, atraumatic. Nares patent. Oropharynx clear.   Mucous membranes moist.  Respiratory: Normal  work of breathing on room air. Lungs CTAB. No   wheezes, rhonchi or rales.  Cardiovascular: RRR. S1, S2. No murmurs, rubs or gallops. Pedal   pulses 2+. No lower extremity edema.  GI: Abdomen non-distended. Normoactive bowel sounds. Soft,   non-tender throughout. No rebounding or guarding.  Lymph/Hematologic: No abnormal or excessive bruising.  Skin: Warm, dry. No jaundice. No rashes on exposed skin.  Musculoskeletal: Normal gait. Moves all extremities.  Neurologic: CN II-XII grossly intact. No focal deficits. Normal   finger-to-nose. No intentional or resting tremor. No bat wing   tremor. No chorea, dystonia or athetosis. Occasional slow speech   or delayed responses.  Psychiatric: Flat, blunted affect. Low mood.    Data   Most Recent 3 CBC's:  Recent Labs   Lab Test 01/23/21  0644 01/15/21  0639 02/14/20  1248   WBC 6.9 8.0 3.2*   HGB 15.6 16.3 14.4   MCV 98 94 95    284 173     Most Recent 2 LFT's:  Recent Labs   Lab Test 01/15/21  0639   AST 17   ALT 19   ALKPHOS 49   BILITOTAL 1.2                 Consults:   Consultation during this admission received from internal medicine to rule out Boyd's disease.     Zena Mcdonald PA-C   Physician Assistant - C   Medicine   Consults      Signed   Date of Service:  1/26/2021  9:31 AM   Creation Time:  1/26/2021  9:31 AM                          []Hide copied text    []Xavier for details  [facility]   Consult Note - Hospitalist Service     Date of Admission:  1/25/2021  Consult Requested by: Debra Naegele, APRN, CNP  Reason for Consult: Low ceruloplasmin, concern for Boyd's disease        Assessment & Plan     Julian Berg is a 20 year old male admitted on 1/25/2021. He has a past medical history significant for Crohn's colitis, depression, MARTA, and substance abuse who is admitted to station 4A for suicidal ideation. He had a recent admission 01/14-01/23 for possible psychosis and suicidal ideation, but  continued to feel hopeless/helpless and frustrated at home.     Low ceruloplasmin  New onset psychosis  Ceruloplasmin 14 in setting of recent new onset psychosis. LFTs, CBC, VitB12, TSH wnl. HIV, Lymes negative. Utox negative on admission. Recent MRI without acute pathology. Concern for possible Boyd's disease given psychiatric symptoms in setting of low ceruloplasmin level. No significant neuro exam findings consistent with Boyd's. DDx of low ceruloplasmin which are less likely at this time include nephrotic syndrome, protein-losing gastropathy or copper deficiency.  - 24 hour urine copper, serum copper level  - Optho consulted for evaluated for Kayser-Fleischer [facility]     Medicine will continue to follow for results of pending work-up and follow-up of Optho consult. Please reach out with any further questions or concerns.     Zena Mcdonald PA-C  [facility]      ______________________________________________________________________           Chief Complaint      Suicidal ideation     History is obtained from the patient and electronic health record           History of Present Illness     Julian Berg is a 20 year old male who has a past medical history significant for Crohn's colitis, depression, MARTA, and substance abuse.     In review of recent H&P from 1/15; patient reportedly put all of his money into Bitcoin including his college funds. Noted to have some though blocking and difficulty answering questions at that time as well. Has some history of substance abuse including daily TCH via vaping, alcohol use, crushing/snorting adderall as well as using acid in high school. He was discharged to home on 1/22 and readmitted 1/25  following concerns for ongoing suicidal thoughts and ideations. He believes this is all a misunderstanding and should not be admitted currently. Writer addressed my role in assisting with ruling out other etiologies of new psychosis  and he felt this was too much work-up.     He does occasionally have a tremor but is unable to provide me many details in regards to this. Attributes many of his symptoms such as difficulty with speech and tremor to psychiatric medications. Does not note any involuntary body movements.     He has a history of crohns for which he is on humira and azathioprine. His symptoms have been well-controlled. His weight has been stable. Denies blood or mucus in his stools. Only having 1-2 BMs daily. Follows outpatient with [facility] but is unable to recall providers name or when he last saw. Unsure if his Crohn's is limited to colon or if he had small bowel involvement as well.            Review of Systems      The 10 point Review of Systems is negative other than noted in the HPI or here.            Past Medical History       I have reviewed this patient's medical history and updated it with pertinent information if needed.        Past Medical History:   Diagnosis Date     Colitis                 Past Surgical History      I have reviewed this patient's surgical history and updated it with pertinent information if needed.        Past Surgical History:   Procedure Laterality Date     GENITOURINARY SURGERY                   Social History      I have reviewed this patient's social history and updated it with pertinent information if needed.  Social History            Tobacco Use     Smoking status: Passive Smoke Exposure - Never Smoker     Smokeless tobacco: Former User   Substance Use Topics     Alcohol use: Yes       Comment: occasional     Drug use: No               Family History      I have reviewed this patient's family history and updated it with pertinent information if needed.   History reviewed. No pertinent family history.           Medications      I have reviewed this patient's current medications           Allergies            Allergies   Allergen Reactions     Nuts         All nuts                Physical Exam      Vital Signs: Temp: 97.2  F (36.2  C) Temp src: Oral BP: 131/72 Pulse: 89   Resp: 16 SpO2: 98 % O2 Device: None (Room air)    Weight: 183 lbs 0 oz     General Appearance: Awake, alert and oriented. No acute distress.  Eyes: Normal lids. Anicteric sclera. PERRL. EOMs intact. No nystagmus.  HEENT: Normocephalic, atraumatic. Nares patent. Oropharynx clear. Mucous membranes moist.  Respiratory: Normal work of breathing on room air. Lungs CTAB. No wheezes, rhonchi or rales.  Cardiovascular: RRR. S1, S2. No murmurs, rubs or gallops. Pedal pulses 2+. No lower extremity edema.  GI: Abdomen non-distended. Normoactive bowel sounds. Soft, non-tender throughout. No rebounding or guarding.  Lymph/Hematologic: No abnormal or excessive bruising.  Skin: Warm, dry. No jaundice. No rashes on exposed skin.  Musculoskeletal: Normal gait. Moves all extremities.  Neurologic: CN II-XII grossly intact. No focal deficits. Normal finger-to-nose. No intentional or resting tremor. No bat wing tremor. No chorea, dystonia or athetosis. Occasional slow speech or delayed responses.  Psychiatric: Flat, blunted affect. Low mood.           Data      Most Recent 3 CBC's:        Recent Labs   Lab Test 01/23/21  0644 01/15/21  0639 02/14/20  1248   WBC 6.9 8.0 3.2*   HGB 15.6 16.3 14.4   MCV 98 94 95    284 173      Most Recent 2 LFT's:      Recent Labs   Lab Test 01/15/21  0639   AST 17   ALT 19   ALKPHOS 49   BILITOTAL 1.2                              Hospital Course:   Julian Berg was admitted to [facility] with attending Farhan Castillo MD as a voluntary patient. The patient was placed under status 15 (15 minute checks) to ensure patient safety.     Patient was discontinued on Zyprexa 10 mg and started on Abilify 10 mg. Decision was made in context of his genetic testing, Crohn's medications and Zyprexa was not effective. Patient's disorganization improved. He was more engaged in conversation, less disorganized. He continues to  ruminate with negative thinking. He lacks insight into his metal illness. Abilify was decreased to 5 mg due to WBC dropping to 4.5 and patient reported akathisia. Patient was started on propranolol 10 mg BIDPRN for akathisia. He needed encouragement to take propanolol. Discussed risks, benefits and side effects of medications with patient and with his mother.       Internal medicine consult- Ruled out Boyd's disease Pls see consult note. Provider discussed with mother to follow up with his GI provider regarding possible poor absorption of copper.      Julian Berg did participate in groups and was visible in the milieu. The patient's symptoms of suicidal dieation improved.     Julian Berg was released to home with [facility] team. At the time of discharge Julian Berg was determined to not be a danger to himself or others.          Discharge Medications:     Current Discharge Medication List      START taking these medications    Details   ARIPiprazole (ABILIFY) 5 MG tablet Take 1 tablet (5 mg) by mouth daily  Qty: 30 tablet, Refills: 0    Associated Diagnoses: Psychosis, unspecified psychosis type (H)      propranolol (INDERAL) 10 MG tablet Take 1 tablet (10 mg) by mouth 2 times daily as needed (anxiety)  Qty: 60 tablet, Refills: 0    Associated Diagnoses: Medication side effects      traZODone (DESYREL) 100 MG tablet Take 1 tablet (100 mg) by mouth nightly as needed for sleep (may repeat after 60 minutes)  Qty: 30 tablet, Refills: 1    Associated Diagnoses: Insomnia due to other mental disorder         CONTINUE these medications which have NOT CHANGED    Details   adalimumab (HUMIRA) 40 MG/0.8ML prefilled syringe kit Inject 40 mg Subcutaneous Every Monday      azaTHIOprine 100 MG TABS Take 100 mg by mouth daily       lactobacillus rhamnosus, GG, (CULTURELL) capsule Take 1 capsule by mouth 2 times daily      melatonin 10 MG TABS tablet Take 1 tablet (10 mg) by mouth At Bedtime  Qty: 30 tablet, Refills: 1     Associated Diagnoses: Insomnia due to other mental disorder      methylfolate (DEPLIN) 7.5 MG TABS tablet Take 7.5 mg by mouth daily         STOP taking these medications       OLANZapine (ZYPREXA) 10 MG tablet Comments:   Reason for Stopping:                    Psychiatric Examination:   Appearance:  awake, alert and adequately groomed  Attitude:  cooperative  Eye Contact:  good  Mood:  better  Affect:  intensity is blunted  Speech:  normal prosody  Psychomotor Behavior:  Patient reported akathisia (uneasiness)  Thought Process:  goal oriented, chronic negative thinking  Associations:  no loose associations, lingering parnoia  Thought Content:  no evidence of suicidal ideation or homicidal ideation, no auditory hallucinations present and no visual hallucinations present  Insight:  limited  Judgment:  limited  Oriented to:  time, person, and place  Attention Span and Concentration:  intact  Recent and Remote Memory:  intact  Language: Able to name objects, Able to repeat phrases and Able to read and write  Fund of Knowledge: appropriate  Muscle Strength and Tone: normal  Gait and Station: Normal         Discharge Plan:   Behavioral Discharge Planning and Instructions        Summary:   You were admitted on 1/25/2021  due to Suicidal Ideations.  You were treated by Debra Naegele, APRN, CNS and discharged on 02/05/21 from [facility] to Home     Principal Diagnosis:   1.  Unspecified psychotic disorder.   2.  Cannabis use disorder, moderate, in recent sustained remission.   3.  Unspecified anxiety disorder with obsessive features.   4.  Nicotine use disorder.      Health Care Follow-up Appointments:   Navigate program Intake appointment   Date: 02/16/2021  Time: 11:00am (This will be a virtual appointment. Log into [employer]. Click on your appointment to join your meeting.)  Provider: Krystal Rodriguez  Address: [address], [address] [place] [address]   Phone: [phone]      Other Referral:  [facility]  "Program through [facility]-1st Episode Psychosis Program  [place] Location  Phone: [phone].     Mental Health Systems:  Cognitive Behavioral Therapy Intensive Outpatient program  Multiple Locations  Phone: [phone]     Attend all scheduled appointments with your outpatient providers. Call at least 24 hours in advance if you need to reschedule an appointment to ensure continued access to your outpatient providers.   Major Treatments, Procedures and Findings:  You were provided with: a psychiatric assessment, assessed for medical stability, medication evaluation and/or management, group therapy and medical interventions     Symptoms to Report: feeling more aggressive, increased confusion, losing more sleep, mood getting worse or thoughts of suicide     Early warning signs can include: increased depression or anxiety sleep disturbances increased thoughts or behaviors of suicide or self-harm  increased unusual thinking, such as paranoia or hearing voices     Safety and Wellness:  Take all medicines as directed.  Make no changes unless your doctor suggests them.      Follow treatment recommendations.  Refrain from alcohol and non-prescribed drugs.  Ask your support system to help you reduce your access to items that could harm yourself or others. Items could include:  Firearms  Medicines (both prescribed and over-the-counter)  Knives and other sharp objects  Ropes and like materials  Car keys  If there is a concern for safety, call 911. If there is a concern for safety, call 911.     Resources:   Crisis Intervention: 155.711.1853 or 258-273-9647 (TTY: 554.583.1077).  Call anytime for help.  [employer] (www.mn.tish.org): 801.221.3636 or 460-892-1061.  National Suicide Prevention Line (www.mentalhealthmn.org): 065-772-FWIA (5822)  [facility] Crisis (COPE) Response - Adult 701 460-7295  Text 4 Life: txt \"LIFE\" to 93077 for immediate support and crisis intervention  Crisis text line: " "Text \"[place]\" to 187716. Free, confidential, 24/7.  [facility] Call for Help ([facility]) 819.161.1036 or 2-1-1       [facility]:[phone]  Services 24-hour walk-in crisis intervention and treatment of behavioral emergencies. Crisis intervention phone service for assessment, information, and referral for psychiatric emergencies. Treatment of psychiatric emergencies such as acute psychotic conditions, panic states, severe and incapacitating depression, suicidal crisis, danger to others, sudden loss of memory, and situations involving grave mental disability. Community consultation and education on crisis intervention     [facility] Services:Coordinate Entry for [facility] Assistance and Supports  Phone: Phone: [phone]     Lifestyle Adjustment:   1. Adjust your lifestyle to get enough sleep, relaxation, exercise and good nutrition.  Continue to develop healthy coping skills to decrease stress and promote a healthy lifestyle.  2. Abstain from all substances of abuse.  3. Take medications as prescribed.  Please work with your doctor to discuss any concerns you have with your medications or side affects you may be experiencing.  4. Follow up with appointments as scheduled.       General Medication Instructions:   1. See your medication sheet(s) for instructions.   2. Take all medicines as directed.  Make no changes unless your doctor suggests them.   3. Go to all your doctor visits.  4. Be sure to have all your required lab tests. This way, your medicines can be refilled on time.  5. Do not use any drugs not prescribed by your doctor.     The treatment team has appreciated the opportunity to work with you.     Herminio, please take care and make your recovery a daily recovery.   If you have any questions or concerns our unit number is [phone]     If you would like to obtain any specific documentation regarding your hospitalization after your discharge, contact [place] Release of " Information/Medical Records:  [phone]     Attestation:  The patient has been seen and evaluated by me,  Debra A. Naegele, APRN CNS on 2/5/2021  Discharge consult time > 30 minutes     Video-Visit Details     Type of service:  Video Visit     Video Start Time (time video started): 1016     Video End Time (time video stopped): 1026    Originating Location (pt. Location): Other Station 4A     Distant Location (provider location): Provider remote location     Mode of Communication:  Video Conference via [employer]     Physician has received verbal consent for a Video Visit from the patient? Yes

## 2021-02-04 NOTE — PROGRESS NOTES
Work Completed: Spoke to pt's mom re: discharge plans. Pt's mom asked about other crisis resources/other resources in general as she is nervous navigate won't work out for pt. [facility] provided information on CM and IRTS but shared that due to pt not having MA or back up MA it would be difficult to get him involved with either. Discussed getting pt set up with either MA or back up MA. [facility] provided pt's mom with number to [facility] for more information on getting pt set up with MA. Since pt's mom was interested in seeking care from Dr. Bradford at [facility], [facility] mentioned to pt mom that  has a first episode program called [facility]. Pt's mom asked [facility] to make a referral to 's [facility] program. [facility] sent over clinical information to initiate the referral process. [facility] called and spoke with pt about discharge and referral to [facility]. Pt agreed to [facility] making the referral.     Discharge plan or goal: Home with Navigate or possible [facility] program through [facility]                Barriers to discharge: None. Pt will discharge tomorrow.

## 2021-02-04 NOTE — DISCHARGE INSTRUCTIONS
Behavioral Discharge Planning and Instructions      Summary:   You were admitted on 1/25/2021  due to Suicidal Ideations.  You were treated by Debra Naegele, APRN, CNS and discharged on 02/05/21 from Station 4A to Home    Principal Diagnosis:   1.  Unspecified psychotic disorder.   2.  Cannabis use disorder, moderate, in recent sustained remission.   3.  Unspecified anxiety disorder with obsessive features.   4.  Nicotine use disorder.     Health Care Follow-up Appointments:   Navigate program Intake appointment   Date: 02/16/2021  Time: 11:00am (This will be a virtual appointment. Log into [employer]. Click on your appointment to join your meeting.)  Provider: Krystal Rodriguez  Address: [address], [address] [place] [address]   Phone: [phone]     Other Referral:  [facility] Program through [facility]-1st Episode Psychosis Program  [place] Location  Phone: [phone].    Mental Health Systems:  Cognitive Behavioral Therapy Intensive Outpatient program  Multiple Locations  Phone: [phone]    Attend all scheduled appointments with your outpatient providers. Call at least 24 hours in advance if you need to reschedule an appointment to ensure continued access to your outpatient providers.   Major Treatments, Procedures and Findings:  You were provided with: a psychiatric assessment, assessed for medical stability, medication evaluation and/or management, group therapy and medical interventions    Symptoms to Report: feeling more aggressive, increased confusion, losing more sleep, mood getting worse or thoughts of suicide    Early warning signs can include: increased depression or anxiety sleep disturbances increased thoughts or behaviors of suicide or self-harm  increased unusual thinking, such as paranoia or hearing voices    Safety and Wellness:  Take all medicines as directed.  Make no changes unless your doctor suggests them.      Follow treatment recommendations.  Refrain from alcohol and  "non-prescribed drugs.  Ask your support system to help you reduce your access to items that could harm yourself or others. Items could include:  Firearms  Medicines (both prescribed and over-the-counter)  Knives and other sharp objects  Ropes and like materials  Car keys  If there is a concern for safety, call 911. If there is a concern for safety, call 911.    Resources:   Crisis Intervention: 514.189.2267 or 763-458-5143 (TTY: 500.352.1904).  Call anytime for help.  National [place] on Mental Illness (www.mn.tish.org): 547.227.5863 or 782-410-7632.  National Suicide Prevention Line (www.mentalhealthmn.org): 941-192-MFZA (0291)  [facility] Crisis (COPE) Response - Adult 824 926-8078  Text 4 Life: txt \"LIFE\" to 78215 for immediate support and crisis intervention  Crisis text line: Text \"[place]\" to 281352. Free, confidential, 24/7.  [place] Call for Help ([employer]) 163.681.9191 or 2-1-1    [facility]- Acute Psychiatric Services:[phone]  Services 24-hour walk-in crisis intervention and treatment of behavioral emergencies. Crisis intervention phone service for assessment, information, and referral for psychiatric emergencies. Treatment of psychiatric emergencies such as acute psychotic conditions, panic states, severe and incapacitating depression, suicidal crisis, danger to others, sudden loss of memory, and situations involving grave mental disability. Community consultation and education on crisis intervention    [facility] Services:Coordinate Entry for [facility] Assistance and Supports  Phone: Phone: [phone]    Lifestyle Adjustment:   1. Adjust your lifestyle to get enough sleep, relaxation, exercise and good nutrition.  Continue to develop healthy coping skills to decrease stress and promote a healthy lifestyle.  2. Abstain from all substances of abuse.  3. Take medications as prescribed.  Please work with your doctor to discuss any concerns you have with your medications or side affects you may " be experiencing.  4. Follow up with appointments as scheduled.      General Medication Instructions:   1. See your medication sheet(s) for instructions.   2. Take all medicines as directed.  Make no changes unless your doctor suggests them.   3. Go to all your doctor visits.  4. Be sure to have all your required lab tests. This way, your medicines can be refilled on time.  5. Do not use any drugs not prescribed by your doctor.    The treatment team has appreciated the opportunity to work with you.     Herminio, please take care and make your recovery a daily recovery.   If you have any questions or concerns our unit number is [phone]    If you would like to obtain any specific documentation regarding your hospitalization after your discharge, contact [place] Release of Information/Medical Records:  [phone]

## 2021-02-04 NOTE — PLAN OF CARE
Problem: Psychotic Symptoms  Goal: Psychotic Symptoms  Description: Signs and symptoms of listed problems will be absent or manageable.  Outcome: Improving  Pt remains very down on himself. He remains depressed but denies SI. We discussed hope and the power of positive thinking and he stated that if everyone went thru what he has gone thru, they would understand. Pt remains negative and states he see's no way out. He was quite negative on the phone also with his mom this morning. We discussed negative vs positive thinking but he just couldn't go there.  Will continue to assess.

## 2021-02-04 NOTE — PLAN OF CARE
"Problem: OT General Care Plan  Goal: OT Goal 1  Description: Within 1 week, Pt will demonstrate increased self-esteem as evidenced by >2 self-reported positive affirmations.   Outcome: Improving    Participated in mental health management group focused on self-expression/reflection and identification of alternative perspectives through use of creative writing and poetry.  Pt inquires about details related to the task. Writer provided structure, but encouraged flexibility and creativity in his thinking. Pt demonstrated understanding of the activity through identifying a topic to write about using the techniques discussed. Pt shared his work, and his poem's topic \"unintentially was about time\". \"Time\" has been a consistent topic in 1:1 conversations writer has had with him in regards to concerns he has had about \"wasting his time\" and \"regrets I [he] have about choices in the past\". Pt did well with encouragement though, challenging some rigidity in his thinking.     "

## 2021-02-05 VITALS
WEIGHT: 177 LBS | DIASTOLIC BLOOD PRESSURE: 68 MMHG | SYSTOLIC BLOOD PRESSURE: 102 MMHG | HEIGHT: 75 IN | BODY MASS INDEX: 22.01 KG/M2 | HEART RATE: 93 BPM | RESPIRATION RATE: 16 BRPM | TEMPERATURE: 97.9 F | OXYGEN SATURATION: 96 %

## 2021-02-05 PROCEDURE — 250N000012 HC RX MED GY IP 250 OP 636 PS 637: Performed by: PSYCHIATRY & NEUROLOGY

## 2021-02-05 PROCEDURE — 250N000013 HC RX MED GY IP 250 OP 250 PS 637: Performed by: PSYCHIATRY & NEUROLOGY

## 2021-02-05 PROCEDURE — 250N000013 HC RX MED GY IP 250 OP 250 PS 637: Performed by: CLINICAL NURSE SPECIALIST

## 2021-02-05 PROCEDURE — 99239 HOSP IP/OBS DSCHRG MGMT >30: CPT | Mod: 95 | Performed by: CLINICAL NURSE SPECIALIST

## 2021-02-05 RX ADMIN — Medication 1 CAPSULE: at 09:00

## 2021-02-05 RX ADMIN — ARIPIPRAZOLE 5 MG: 5 TABLET ORAL at 09:00

## 2021-02-05 RX ADMIN — LEVOMEFOLATE CALCIUM 7.5 MG TABLET 7.5 MG: TABLET at 09:00

## 2021-02-05 RX ADMIN — PROPRANOLOL HYDROCHLORIDE 10 MG: 10 TABLET ORAL at 10:30

## 2021-02-05 RX ADMIN — AZATHIOPRINE 100 MG: 50 TABLET ORAL at 09:00

## 2021-02-05 NOTE — PLAN OF CARE
"DISCHARGE:  This RN and pt have reviewed all meds and aftercare plan. All belongings returned. Pt denies SI , admits to anxiety & depression at this time because \"you guys destroyed me with your anti psychotics\"!!. Pt negative and down about \"everything\" upon discharge to mom @ 1300.    "

## 2021-02-05 NOTE — PLAN OF CARE
"  Problem: Psychotic Symptoms  Goal: Psychotic Symptoms  Description: Signs and symptoms of listed problems will be absent or manageable.  Note: Pt had an okay shift. Pt spent the majority of the shift isolative to his room. Pt continues to deny SI, SIB and hallucinations. Pt has been taking his medications and reports restlessness and a tremor. With encouragement, pt accepting of PRN propranolol, which was ordered for akathisia. Pt reports feeling that Abilify is not the right medication for him and reports frustration with having to take \"so many medications.\" Pt reports that sleep has been poor despite documentation of sleeping all night, and he has been utilizing PRN trazodone. Pt reports appetite has been adequate. Pt denies any other concerns at this time.      "

## 2021-02-05 NOTE — PROGRESS NOTES
met with pt for 30 minutes. He said he will be staying with mom and focusing on mental health and navigate program before thoughts of returning to school. He did complete worksheets writer gave him. Gabriellar worked with him on emotional regulation and coping and some alternatives to SI thinking when he becomes frustrated.     He talked about not getting any sleep here. He talks with some ambivalence about engaging with pts on the unit, writer encouraged that he will engage with other peers via zoom with navigate program and that it would be good for him not to isolate. He speaks non convincingly about his family's directive of living with them and putting mental health treatment first. He expressed some desire to be back with his college friends . He doesn't speak with self awareness about substance history either. Gabriellar did not see evidence of psychosis.      He is pleased he has a discharge date.

## 2021-09-18 ENCOUNTER — HEALTH MAINTENANCE LETTER (OUTPATIENT)
Age: 21
End: 2021-09-18

## 2022-01-08 ENCOUNTER — HEALTH MAINTENANCE LETTER (OUTPATIENT)
Age: 22
End: 2022-01-08

## 2022-03-18 ENCOUNTER — LAB REQUISITION (OUTPATIENT)
Dept: LAB | Facility: CLINIC | Age: 22
End: 2022-03-18
Payer: COMMERCIAL

## 2022-03-18 DIAGNOSIS — F20.9 SCHIZOPHRENIA, UNSPECIFIED (H): ICD-10-CM

## 2022-03-18 LAB
BASOPHILS # BLD AUTO: 0 10E3/UL (ref 0–0.2)
BASOPHILS NFR BLD AUTO: 1 %
EOSINOPHIL # BLD AUTO: 0.4 10E3/UL (ref 0–0.7)
EOSINOPHIL NFR BLD AUTO: 8 %
ERYTHROCYTE [DISTWIDTH] IN BLOOD BY AUTOMATED COUNT: 12 % (ref 10–15)
HCT VFR BLD AUTO: 46.9 % (ref 40–53)
HGB BLD-MCNC: 15.9 G/DL (ref 13.3–17.7)
IMM GRANULOCYTES # BLD: 0 10E3/UL
IMM GRANULOCYTES NFR BLD: 0 %
LYMPHOCYTES # BLD AUTO: 1.3 10E3/UL (ref 0.8–5.3)
LYMPHOCYTES NFR BLD AUTO: 28 %
MCH RBC QN AUTO: 32.1 PG (ref 26.5–33)
MCHC RBC AUTO-ENTMCNC: 33.9 G/DL (ref 31.5–36.5)
MCV RBC AUTO: 95 FL (ref 78–100)
MONOCYTES # BLD AUTO: 0.5 10E3/UL (ref 0–1.3)
MONOCYTES NFR BLD AUTO: 11 %
NEUTROPHILS # BLD AUTO: 2.4 10E3/UL (ref 1.6–8.3)
NEUTROPHILS NFR BLD AUTO: 52 %
NRBC # BLD AUTO: 0 10E3/UL
NRBC BLD AUTO-RTO: 0 /100
PLATELET # BLD AUTO: 277 10E3/UL (ref 150–450)
RBC # BLD AUTO: 4.95 10E6/UL (ref 4.4–5.9)
WBC # BLD AUTO: 4.6 10E3/UL (ref 4–11)

## 2022-03-18 PROCEDURE — 85025 COMPLETE CBC W/AUTO DIFF WBC: CPT | Mod: ORL | Performed by: PSYCHIATRY & NEUROLOGY

## 2022-09-18 ENCOUNTER — HOSPITAL ENCOUNTER (EMERGENCY)
Facility: CLINIC | Age: 22
Discharge: HOME | End: 2022-09-18
Attending: EMERGENCY MEDICINE | Admitting: EMERGENCY MEDICINE
Payer: COMMERCIAL

## 2022-09-18 VITALS
HEART RATE: 72 BPM | TEMPERATURE: 98.3 F | BODY MASS INDEX: 25.51 KG/M2 | RESPIRATION RATE: 16 BRPM | OXYGEN SATURATION: 100 % | WEIGHT: 209.5 LBS | DIASTOLIC BLOOD PRESSURE: 81 MMHG | HEIGHT: 76 IN | SYSTOLIC BLOOD PRESSURE: 133 MMHG

## 2022-09-18 DIAGNOSIS — F32.A ACUTE DEPRESSION: ICD-10-CM

## 2022-09-18 DIAGNOSIS — F33.1 MODERATE EPISODE OF RECURRENT MAJOR DEPRESSIVE DISORDER (H): ICD-10-CM

## 2022-09-18 LAB
ANION GAP SERPL CALCULATED.3IONS-SCNC: 6 MMOL/L (ref 3–14)
BASOPHILS # BLD AUTO: 0 10E3/UL (ref 0–0.2)
BASOPHILS NFR BLD AUTO: 1 %
BUN SERPL-MCNC: 15 MG/DL (ref 7–30)
CALCIUM SERPL-MCNC: 9.3 MG/DL (ref 8.5–10.1)
CHLORIDE BLD-SCNC: 105 MMOL/L (ref 94–109)
CO2 SERPL-SCNC: 27 MMOL/L (ref 20–32)
CREAT SERPL-MCNC: 0.8 MG/DL (ref 0.66–1.25)
EOSINOPHIL # BLD AUTO: 0.6 10E3/UL (ref 0–0.7)
EOSINOPHIL NFR BLD AUTO: 8 %
ERYTHROCYTE [DISTWIDTH] IN BLOOD BY AUTOMATED COUNT: 11.9 % (ref 10–15)
GFR SERPL CREATININE-BSD FRML MDRD: >90 ML/MIN/1.73M2
GLUCOSE BLD-MCNC: 98 MG/DL (ref 70–99)
HCT VFR BLD AUTO: 47.2 % (ref 40–53)
HGB BLD-MCNC: 15.9 G/DL (ref 13.3–17.7)
IMM GRANULOCYTES # BLD: 0 10E3/UL
IMM GRANULOCYTES NFR BLD: 0 %
LITHIUM SERPL-SCNC: 0.3 MMOL/L
LYMPHOCYTES # BLD AUTO: 1.7 10E3/UL (ref 0.8–5.3)
LYMPHOCYTES NFR BLD AUTO: 23 %
MCH RBC QN AUTO: 32.3 PG (ref 26.5–33)
MCHC RBC AUTO-ENTMCNC: 33.7 G/DL (ref 31.5–36.5)
MCV RBC AUTO: 96 FL (ref 78–100)
MONOCYTES # BLD AUTO: 0.9 10E3/UL (ref 0–1.3)
MONOCYTES NFR BLD AUTO: 13 %
NEUTROPHILS # BLD AUTO: 4 10E3/UL (ref 1.6–8.3)
NEUTROPHILS NFR BLD AUTO: 55 %
NRBC # BLD AUTO: 0 10E3/UL
NRBC BLD AUTO-RTO: 0 /100
PLATELET # BLD AUTO: 234 10E3/UL (ref 150–450)
POTASSIUM BLD-SCNC: 3.7 MMOL/L (ref 3.4–5.3)
RBC # BLD AUTO: 4.92 10E6/UL (ref 4.4–5.9)
SARS-COV-2 RNA RESP QL NAA+PROBE: NEGATIVE
SODIUM SERPL-SCNC: 138 MMOL/L (ref 133–144)
TSH SERPL DL<=0.005 MIU/L-ACNC: 1.86 MU/L (ref 0.4–4)
WBC # BLD AUTO: 7.2 10E3/UL (ref 4–11)

## 2022-09-18 PROCEDURE — C9803 HOPD COVID-19 SPEC COLLECT: HCPCS

## 2022-09-18 PROCEDURE — 84443 ASSAY THYROID STIM HORMONE: CPT | Performed by: EMERGENCY MEDICINE

## 2022-09-18 PROCEDURE — 99285 EMERGENCY DEPT VISIT HI MDM: CPT | Mod: 25

## 2022-09-18 PROCEDURE — 36415 COLL VENOUS BLD VENIPUNCTURE: CPT | Performed by: EMERGENCY MEDICINE

## 2022-09-18 PROCEDURE — U0003 INFECTIOUS AGENT DETECTION BY NUCLEIC ACID (DNA OR RNA); SEVERE ACUTE RESPIRATORY SYNDROME CORONAVIRUS 2 (SARS-COV-2) (CORONAVIRUS DISEASE [COVID-19]), AMPLIFIED PROBE TECHNIQUE, MAKING USE OF HIGH THROUGHPUT TECHNOLOGIES AS DESCRIBED BY CMS-2020-01-R: HCPCS | Performed by: EMERGENCY MEDICINE

## 2022-09-18 PROCEDURE — 80178 ASSAY OF LITHIUM: CPT | Performed by: EMERGENCY MEDICINE

## 2022-09-18 PROCEDURE — 99244 OFF/OP CNSLTJ NEW/EST MOD 40: CPT | Performed by: NURSE PRACTITIONER

## 2022-09-18 PROCEDURE — 85025 COMPLETE CBC W/AUTO DIFF WBC: CPT | Performed by: EMERGENCY MEDICINE

## 2022-09-18 PROCEDURE — 80048 BASIC METABOLIC PNL TOTAL CA: CPT | Performed by: EMERGENCY MEDICINE

## 2022-09-18 PROCEDURE — 90791 PSYCH DIAGNOSTIC EVALUATION: CPT

## 2022-09-18 RX ORDER — LITHIUM CARBONATE 300 MG/1
300 TABLET, FILM COATED, EXTENDED RELEASE ORAL 2 TIMES DAILY
COMMUNITY

## 2022-09-18 RX ORDER — ARIPIPRAZOLE 10 MG/1
10 TABLET ORAL DAILY
COMMUNITY
Start: 2022-09-16

## 2022-09-18 ASSESSMENT — ENCOUNTER SYMPTOMS
DIARRHEA: 0
BLOOD IN STOOL: 0
FEVER: 0
DYSURIA: 0
CONFUSION: 1
ABDOMINAL PAIN: 0
COUGH: 0
HEMATURIA: 0
VOMITING: 0

## 2022-09-18 ASSESSMENT — ACTIVITIES OF DAILY LIVING (ADL)
ADLS_ACUITY_SCORE: 35
ADLS_ACUITY_SCORE: 35

## 2022-09-18 NOTE — ED TRIAGE NOTES
Experiencing depression, and sometimes having issues with processing information, and ability to speak the way he normally speaks.  Mom states he has made suicidal statements, and Herminio is saying he is only states these out of anger.     Triage Assessment     Row Name 09/18/22 4740       Triage Assessment (Adult)    Airway WDL WDL       Respiratory WDL    Respiratory WDL WDL       Skin Circulation/Temperature WDL    Skin Circulation/Temperature WDL WDL       Cardiac WDL    Cardiac WDL WDL       Peripheral/Neurovascular WDL    Peripheral Neurovascular WDL WDL       Cognitive/Neuro/Behavioral WDL    Cognitive/Neuro/Behavioral WDL X

## 2022-09-18 NOTE — ED PROVIDER NOTES
History   Chief Complaint:   Diagnostic Assessment and Suicidal    The history is provided by the patient.      Julian Berg is a 21 year old male with history of Chron's disease who presents with depression and cognitive issues. He states that he has recently been having severe depression, trouble focusing, and memory issues. He also complains that he has been having vision issues (blurry vision, no diplopia) lately even though he has no history of wearing glasses or contacts. He denies any vomiting, diarrhea, cough, fevers, chest pain, abdominal pain, dysuria, hematuria, or blood in his stool. He confirms that he has not had Covid recently. He denies any suicidal or homicidal ideation. He reports that he does have a regular psychiatrist, Dr. Key. He adds that he has a history of marijuana and LSD use over the last 2 years, which he has stopped since spring and since regrets. He reports that he normally has 3-6 drinks once a week, sometimes twice. He adds that he was taking weekly Abilify injections before stopping prior to his last scheduled dose 5 days ago. Since then he is now taking 10 mg orally. He reports he is going to be on 10 mg for the next month or so before hopefully dropping back down to 5 mg.  He also reports his lithium dose was reduced recently out of concern that that may be causing some of his cognitive issues of difficulty focusing.  No recent concussion.  No recent head injury.  No fevers.    1/14/21 MRI Brain  Impression: No acute intracranial pathology.    Review of Systems   Constitutional: Negative for fever.   Eyes: Positive for visual disturbance.   Respiratory: Negative for cough.    Cardiovascular: Negative for chest pain.   Gastrointestinal: Negative for abdominal pain, blood in stool, diarrhea and vomiting.   Genitourinary: Negative for dysuria and hematuria.   Psychiatric/Behavioral: Positive for confusion.   All other systems reviewed and are negative.    Allergies:  The  patient denies any known drug allergies.     Medications:  Abilify   Humira   Culturell   Deplin   Inderal   Desyrel   Azathioprine     Past Medical History:    Psychosis   Suicidal ideation     Past Surgical History:    Genitourinary surgery      Social History:  Patient came from home.  Patient is accompanied in the ED.  Patient affirms alcohol use.     Physical Exam     Patient Vitals for the past 24 hrs:   BP Temp Temp src Pulse Resp SpO2   09/18/22 1743 123/71 99  F (37.2  C) Temporal 88 16 98 %       Physical Exam   Physical Exam   General:  Sitting on bed with mother and father at bedside.   HENT:  No obvious trauma to head  Right Ear:  External ear normal.   Left Ear:  External ear normal.   Nose:  Nose normal.   Eyes:  Conjunctivae and EOM are normal. Pupils are equal, round, and reactive.   Neck: Normal range of motion. Neck supple. No tracheal deviation present.   CV:  Normal heart sounds. No murmur heard.  Pulm/Chest: Effort normal and breath sounds normal.   Abd: Soft. No distension. There is no tenderness. There is no rigidity, no rebound and no guarding.   M/S: Normal range of motion.   Neuro: CN II-XII Grossly intact, no pronator drift, normal finger-nose-finger, visual fields intact by confrontation. Muscle strength is +5 proximal and distal in the bilateral upper and lower extremities. No dysarthria. Normal palm up, palm down.  Skin: Skin is warm and dry. No rash noted. Not diaphoretic.   Psych: Normal mood and affect. Behavior is normal.     Emergency Department Course   Laboratory:  Labs Ordered and Resulted from Time of ED Arrival to Time of ED Departure   COVID-19 VIRUS (CORONAVIRUS) BY PCR - Normal       Result Value    SARS CoV2 PCR Negative     BASIC METABOLIC PANEL - Normal    Sodium 138      Potassium 3.7      Chloride 105      Carbon Dioxide (CO2) 27      Anion Gap 6      Urea Nitrogen 15      Creatinine 0.80      Calcium 9.3      Glucose 98      GFR Estimate >90     LITHIUM LEVEL -  Normal    Lithium 0.3     TSH WITH FREE T4 REFLEX - Normal    TSH 1.86     CBC WITH PLATELETS AND DIFFERENTIAL    WBC Count 7.2      RBC Count 4.92      Hemoglobin 15.9      Hematocrit 47.2      MCV 96      MCH 32.3      MCHC 33.7      RDW 11.9      Platelet Count 234      % Neutrophils 55      % Lymphocytes 23      % Monocytes 13      % Eosinophils 8      % Basophils 1      % Immature Granulocytes 0      NRBCs per 100 WBC 0      Absolute Neutrophils 4.0      Absolute Lymphocytes 1.7      Absolute Monocytes 0.9      Absolute Eosinophils 0.6      Absolute Basophils 0.0      Absolute Immature Granulocytes 0.0      Absolute NRBCs 0.0        Emergency Department Course:  Reviewed:  I reviewed nursing notes, vitals and past medical history    Assessments:  1805 I obtained history and examined the patient as noted above.   1925 I rechecked the patient and explained findings to him and parents.     Interventions:  Medications - No data to display    Disposition:  The patient was transferred to [facility].     Impression & Plan   Medical Decision Making:  Julian Berg is a very pleasant 21 year old year old patient who presents to the emergency department with concern of increasing depression.  This has been an ongoing issue along with some cognitive concerns.  He reports difficulty focusing and not doing as well in school as he would expect.  He does report the schooling has been more challenging.  He has not had any recent concussions.  He has no focal neurologic deficit.  No headaches or fevers to suggest meningitis.  He is on lithium and the dose was recently reduced.  Today's level does show it is slightly subtherapeutic.  He is also transitioning from Abilify IM to p.o.  The patient reports his depression is getting worse.  He is not suicidal.  He does report a history of LSD use last spring and he is concerned that he may have caused permanent brain damage from that.  He did have an MRI approximately a year and a half  ago that was unremarkable. He has no focal neurologic deficits at this time to warrant need for emergent repeat imaging.  Remainder his labs are normal.  He is COVID-negative.  At this time he is medically cleared and will be transferred to our empath unit for additional evaluation and treatment of his mental health.  He is voluntary, cooperative, in agreement and grateful for this opportunity.  His parents are both here and supportive as well.    Diagnosis:    ICD-10-CM    1. Acute depression  F32.A        Discharge Medications:  New Prescriptions    No medications on file       Scribe Disclosure:  Michael MENDEZ Ismail, am serving as a scribe at 6:07 PM on 9/18/2022 to document services personally performed by Catrachito Aragon DO based on my observations and the provider's statements to me.      Catrachito Aragon DO  09/18/22 1931

## 2022-09-19 NOTE — ED PROVIDER NOTES
[facility] Unit - Psychiatric Consultation  [facility] Emergency Department    Julian Berg MRN: [id number]   Age: 21 year old YOB: 2000     History     Chief Complaint   Patient presents with      Diagnostic Assessment     Suicidal     HPI  Julian Berg is a 21 year old male with history notable for history of depression, unspecified psychosis, general anxiety disorder, polysubstance abuse (cannabis, alcohol, and LSD one one occasion). He presents to the emergency department today after making suicidal statements to his parents in the context of discussing withdrawing from the [facility] due to cognitive weaknesses he is experiencing.  Patient was evaluated by the ED provider, who medically cleared patient to transfer to [facility] for psychiatric assessment, this is reviewed along with all pertinent labs and tests performed.    Last hospitalization was on two occasions in January 2021 with a discharge plan to work with the [facility].  Per notes the symptoms began Sept 2019 when patient started college, joined a fraternity, and began smoking MJ daily. He began experiencing significant anxiety about school performance, prompting two ED visits 11/2019 and 12/2019. He was seen in [facility] 12/2019. Records indicated the cognitive weaknesses have been present since November 2019 when there was documented brain fog, word-finding difficulty, impaired memory and concentration. Patient indicates this has worsened since starting Abilify Maintena.  He has been working with his psychiatrist to evaluate if the medications he is prescribed is playing into the clinical picture. He reports the cognitive impairment worsens his depression.  Denies suicidal ideation while in the emergency room.  States he makes those statements when he is frustrated and regrets this afterwards.  He shares he has never had a suicide attempt or a plan with gathering supplies.  He is adamant he has a desire to live and  wants to go to school tomorrow.  Reports alcohol use 2 times a week. Denies other substance use with last use of cannabis in February, 2022.     Diagnosis on file:   1.  Unspecified psychotic disorder.   2.  Cannabis use disorder, moderate, in recent sustained remission.   3.  Unspecified anxiety disorder with obsessive features.   4.  Nicotine use disorder.       OUTPATIENT TEAM:  Therapist: none, declines  Psychiatry: [facility]. [facility] at one point.  States he is seeing Dr. Ashely Bradford with [facility].  Seen last Friday with medication adjustments as noted below.    No current facility-administered medications for this encounter.    Current Outpatient Medications:      adalimumab (HUMIRA) 40 MG/0.8ML prefilled syringe kit, Inject 40 mg Subcutaneous Every Monday, Disp: , Rfl:      azaTHIOprine 100 MG TABS, Take 100 mg by mouth daily , Disp: , Rfl:      lactobacillus rhamnosus, GG, (CULTURELL) capsule, Take 1 capsule by mouth 2 times daily, Disp: , Rfl:      lithium ER (LITHOBID) 300 MG CR tablet, Take 300 mg by mouth 2 times daily, Disp: , Rfl:      melatonin 10 MG TABS tablet, Take 1 tablet (10 mg) by mouth At Bedtime, Disp: 30 tablet, Rfl: 1     methylfolate (DEPLIN) 7.5 MG TABS tablet, Take 7.5 mg by mouth daily, Disp: , Rfl:      traZODone (DESYREL) 100 MG tablet, Take 1 tablet (100 mg) by mouth nightly as needed for sleep (may repeat after 60 minutes), Disp: 30 tablet, Rfl: 1     ARIPiprazole (ABILIFY) 10 MG tablet, Take 10 mg by mouth daily, Disp: , Rfl:      ARIPiprazole (ABILIFY) 5 MG tablet, Take 1 tablet (5 mg) by mouth daily (Patient not taking: Reported on 9/18/2022), Disp: 30 tablet, Rfl: 0     propranolol (INDERAL) 10 MG tablet, Take 1 tablet (10 mg) by mouth 2 times daily as needed (anxiety) (Patient not taking: Reported on 9/18/2022), Disp: 60 tablet, Rfl: 0    NOTES ABOUT CURRENT PSYCHOTROPIC MEDICATIONS: per patient and  "mother, working with psychiatrist to taper off medication to evaluate if the medication is playing into cognitive weaknesses  Abilify 10 mg starting Saturday then plans to decrease to 5 mg per his outpatient provider.  Last Abilify Maintena was approximately 4 weeks ago.    Lithium 600 mg, recent decrease       Past Medical History  Past Medical History:   Diagnosis Date     Colitis      Past Surgical History:   Procedure Laterality Date     GENITOURINARY SURGERY       adalimumab (HUMIRA) 40 MG/0.8ML prefilled syringe kit  azaTHIOprine 100 MG TABS  lactobacillus rhamnosus, GG, (CULTURELL) capsule  lithium ER (LITHOBID) 300 MG CR tablet  melatonin 10 MG TABS tablet  methylfolate (DEPLIN) 7.5 MG TABS tablet  traZODone (DESYREL) 100 MG tablet  ARIPiprazole (ABILIFY) 10 MG tablet  ARIPiprazole (ABILIFY) 5 MG tablet  propranolol (INDERAL) 10 MG tablet      Allergies   Allergen Reactions     Nuts      All nuts      Family History  History reviewed. No pertinent family history.  Social History   Social History     Tobacco Use     Smoking status: Passive Smoke Exposure - Never Smoker     Smokeless tobacco: Former User   Substance Use Topics     Alcohol use: Yes     Comment: occasional     Drug use: No      Past medical history, past surgical history, medications, allergies, family history, and social history were reviewed with the patient. No additional pertinent items.       Review of Systems  A complete review of systems was performed with pertinent positives and negatives noted in the HPI, and all other systems negative.    Physical Examination   BP: 123/71  Pulse: 88  Temp: 99  F (37.2  C)  Resp: 16  Height: 191.8 cm (6' 3.5\")  Weight: 95 kg (209 lb 8 oz)  SpO2: 98 %    Physical Exam  General: Appears stated age.   Neuro: Alert and fully oriented. Extremities appear to demonstrate normal strength on visual inspection.   Integumentary/Skin: no rash visualized, normal color    Psychiatric Examination   Appearance: awake, " alert and adequately groomed  Attitude:  cooperative  Eye Contact:  good  Mood:  anxious and depressed  Affect:  appropriate and in normal range  Speech:  clear, coherent  Psychomotor Behavior:  no evidence of tardive dyskinesia, dystonia, or tics  Thought Process:  goal oriented  Associations:  no loose associations  Thought Content:  denies SI, states makes SI statements when dysregluated, no intent or plan  Insight:  fair  Judgement:  fair  Oriented to:  time, person, and place  Attention Span and Concentration:  intact  Recent and Remote Memory:  intact  Language: able to name/identify objects without impairment  Fund of Knowledge: intact with awareness of current and past events    ED Course        Labs Ordered and Resulted from Time of ED Arrival to Time of ED Departure   COVID-19 VIRUS (CORONAVIRUS) BY PCR - Normal       Result Value    SARS CoV2 PCR Negative     BASIC METABOLIC PANEL - Normal    Sodium 138      Potassium 3.7      Chloride 105      Carbon Dioxide (CO2) 27      Anion Gap 6      Urea Nitrogen 15      Creatinine 0.80      Calcium 9.3      Glucose 98      GFR Estimate >90     LITHIUM LEVEL - Normal    Lithium 0.3     TSH WITH FREE T4 REFLEX - Normal    TSH 1.86     CBC WITH PLATELETS AND DIFFERENTIAL    WBC Count 7.2      RBC Count 4.92      Hemoglobin 15.9      Hematocrit 47.2      MCV 96      MCH 32.3      MCHC 33.7      RDW 11.9      Platelet Count 234      % Neutrophils 55      % Lymphocytes 23      % Monocytes 13      % Eosinophils 8      % Basophils 1      % Immature Granulocytes 0      NRBCs per 100 WBC 0      Absolute Neutrophils 4.0      Absolute Lymphocytes 1.7      Absolute Monocytes 0.9      Absolute Eosinophils 0.6      Absolute Basophils 0.0      Absolute Immature Granulocytes 0.0      Absolute NRBCs 0.0         Assessments & Plan (with Medical Decision Making)     Patient presenting with depression and anxiety in the context of cognitive weakness that is distressing and may have to  withdraw from college.  He had this discussion with parents today and he subsequently made suicidal statements.  He presents forward thinking, denies suicidal ideation  Does endorse depressive and anxiety symptoms and is actively working with his outpatient provider. Nursing notes reviewed.      I have reviewed the DEC assessment complete by [facility] dated tday.    Serial assessments of the patient's psychiatric condition were performed. Nursing notes were reviewed. During the observation period, the patient did not require medications for agitation, and did not require restraints/seclusion for patient and/or provider safety.     After a period of working with the treatment team on the [facility] unit, the patient's mental state improved to allow a safe transition to outpatient care. After counseling on the diagnosis, work-up, and treatment plan, the patient was discharged. Close follow-up with a psychiatrist and/or therapist was recommended and community psychiatric resources were provided. Patient is to return to the ED if any urgent or potentially life-threatening concerns.       Discharge Diagnoses:   Final diagnoses:   Acute depression   Moderate episode of recurrent major depressive disorder (H)         Treatment Plan:   -At this time, patient does not meet criteria to be placed under an involuntary hold and will be discharged home per patient request.  -Problem focused, supportive therapy provided around patients stressors and symptoms related to their diagnosis.  Validated patients emotions and problems solved with patient around stress management and self care strategies. Patient was receptive.   -no medication changes as he is actively working with Dr. Bradford with [facility] in titrating medication.  Last see 9/16/2022    At the time of discharge, the patient's acute suicide risk was determined to be low due to the following factors: Reduction in the intensity of mood/anxiety symptoms that preceded the  admission, denial of suicidal thoughts, denies feeling helpless or helpless, not currently under the influence of alcohol or illicit substances, denies experiencing command hallucinations, no immediate access to firearms. The patient's acute risk could be higher if noncompliant with their treatment plan, medications, follow-up appointments or using illicit substances or alcohol.      2215:  Call to mother, Mona Berg, to address concerns about discharge.      --  SANDRA Driscoll CNP   [facility] EMERGENCY DEPT  [facility] Unit         Judith Ghotra APRN CNP  09/19/22 0930

## 2022-09-19 NOTE — DISCHARGE INSTRUCTIONS
"  Aftercare Plan  If I am feeling unsafe or I am in a crisis, I will:   Contact my established care providers   Call the National Suicide Prevention Lifeline: 988  Go to the nearest emergency room   Call 911     Warning signs that I or other people might notice when a crisis is developing for me:   Negative thought patterns, pacing    Things I am able to do on my  own to cope or help me feel better:   Listen to music, deep breathing, go on a walk    Things that I am able to do with others to cope or help me better:   Play sports, watch tv, listen to music    Things I can use or do for distraction:   Listen to music, deep breathing, go on a walk    Changes I can make to support my mental health and wellness:   Exercise more often    People in my life that I can ask for help:   My parents, my brother, friends      Your [facility] has a mental health crisis team you can call 24/7: [facility]  [phone]     Other things that are important when I'm in crisis: reach out to resources as soon as possible    Additional resources and information:   Consider therapy to talk about the stressors and symptoms you are experiencing      Crisis Lines  Crisis Text Line  Text 388849  You will be connected with a trained live crisis counselor to provide support.    Por espanol, texto  ROBERT a 706926 o texto a 442-AYUDAME en WhatsApp    The [employer] (LGBTQ Youth Crisis Line)  0.626.740.7183  text START to 600-599      Community Resources  Fast Tracker  Linking people to mental health and substance use disorder resources  fasttrackMoneyspydern.org     [place] [facility] Warm Line  Peer to peer support  Monday thru Saturday, 12 pm to 10 pm  399.864.9141 or 3.997.081.6582  Text \"Support\" to 06959    National [place] on Mental Illness (GREG)  828.310.9721 or 1.888.GREG.HELPS      Mental Health Apps  My3  https://myIntelligent Business Entertainmentpp.org/    VirtualHopeBox  https://Spontly.org/apps/virtual-hope-box/      Additional " Information  Today you were seen by a licensed mental health professional through Triage and Transition services, Behavioral Healthcare Providers ([facility])  for a crisis assessment in the Emergency Department at [facility].  It is recommended that you follow up with your established providers (psychiatrist, mental health therapist, and/or primary care doctor - as relevant) as soon as possible. Coordinators from [facility] will be calling you in the next 24-48 hours to ensure that you have the resources you need.  You can also contact [facility] coordinators directly at [phone]. You may have been scheduled for or offered an appointment with a mental health provider. [facility] maintains an extensive network of licensed behavioral health providers to connect patients with the services they need.  We do not charge providers a fee to participate in our referral network.  We match patients with providers based on a patient's specific needs, insurance coverage, and location.  Our first effort will be to refer you to a provider within your care system, and will utilize providers outside your care system as needed.

## 2022-09-19 NOTE — ED NOTES
"21 year old male received from ED due to increased depression and cognitive issues. Reports he has been feeling depressed, \"but above all else having cognitive impairment such as memory issues and difficulty processing information, including reading and listening which is affecting my performance in classes at the [employer].\" Patient states he has been experiencing these symptoms for 3-5 months. Denies SI. States in the past he has made suicidal statements such as \"I wish I could just die\" but made these statements out of frustration and has never had thoughts of acting on these statements. Patient is hoping to discharge this evening after speaking with LMHP and psychiatrist.     Nursing and risk assessments completed. Assessments reviewed with LMHP and physician. Admission information reviewed with patient. Patient given a tour of [facility] and instructions on using the facility. Questions regarding [facility] addressed. Pt safety search completed.     "

## 2022-09-19 NOTE — ED NOTES
"The following information was received from David whose relationship to the patient is mom and dad (). Information was obtained in person. Their phone number is [phone] and they last had contact with patient today.    What happened today: He is struggling with school. He started the semester two weeks ago and is having issues cognitively. He can't keep up with his classes and this is causing a lot of anxiety and agitation.  He got upset when we started talking about him dropping out before the deadline.  Mom shares that he will become excessively anxious when preparing about going to campus to attend school.    In the past week he made a comment about wanting to die and that he would do it by jumping off a bridge.  He has made more recent comments about dying and hopelessness that were not a specific plan like the bridge comment.     What is different about patient's functioning: He has changed cognitively.  He needs help reading and responding to texts and emails. He can't track what's going in class, and he will be mute in a conversation for fear people will know he's struggling.  It's not clear if it's the symptoms that are causing this or the anxiety of trying to be \"normal\" that he's putting on himself to fit in that is.      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 Over the summer he basically did nothing, just slept in bed and didn't shower more than 1-2 times/week.  He only eats.  He will say it's situation and refuses to go to therapy about addressing this.  He gets very upset when he thinks people may know what's going on wit him.     He just changed from a monthly shot of Abilify back to oral this weekend. We're worried that the dosage of the shot is too much and that is what is causing the cognition issues. Also, his lithium was reduced from 900mg to 600mg. I actually heard " "him laugh once or twice since this has change occurred .     Concern about alcohol/drug use: Yes He has stopped taking marijuana but he can easily go through a case of beer over the weekend.     What do you think the patient needs: Inpatient. He was traumatized at [employer], so if he does go inpatient, please don't let him go there.     Has patient made comments about wanting to kill themselves/others:  Yes He makes comments about wanting to die.  Earlier in the week he said he would do it by jumping off a bridge. He has said \"I just need to kill myself\"  He feels stuck between keeping up with pressures of school, but it is unacceptable to him to drop out and extend his college career. He has made comments that he knows how he would commit suicide.      If d/c is recommended, can they take part in safety/aftercare planning: Yes he lives with mom who works from home     Other information:         "

## 2022-09-19 NOTE — ED NOTES
Patient requesting to discharge. Discharge instructions reviewed with patient including follow-up care plan. Medications: continue with prescribed medications. No new medication changes made this visit. Reviewed safety plan and outpatient resources. Denies SI and HI. All belongings that were brought into the hospital have been returned to patient. Escorted off the unit at 2240 accompanied by [facility] staff. Discharged to home via ride from [facility].

## 2022-09-19 NOTE — CONSULTS
Diagnostic Evaluation Consultation  Crisis Assessment    Patient was assessed: In Person  Patient location: [facility]  Was a release of information signed: No. Reason: pt declined      Referral Data and Chief Complaint  Julian Berg is a 21 year old male who uses he/him pronouns. He presents to the ED with his parents, seeking mental health assessment. Pt reports cognitive issues including brain fog, memory loss, difficulty processing and searching for words. He denies SI, plan, intent. However, parents report he made threats to jump off a bridge tonight.     Informed Consent and Assessment Methods  Patient is his own guardian. Writer met with patient and explained the crisis assessment process, including applicable information disclosures and limits to confidentiality, assessed understanding of the process, and obtained consent to proceed with the assessment. Patient was observed to be able to participate in the assessment as evidenced by verbal agreement and active participation. Assessment methods included conducting a formal interview with patient, review of medical records, collaboration with medical staff, and obtaining relevant collateral information from family and community providers when available..     Over the course of this crisis assessment provided reassurance, offered validation and engaged patient in problem solving and disposition planning. Patient's response to interventions was engaged. However collateral from parents suggest Pt was grossly under-reporting his symptoms.     Summary of Patient Situation  Pt reports experiencing cognitive issues for about 5 months. He denies current SI/HI/SIB. His parents report that he made threats to jump off a bridge tonight when they suggested he withdraw from school this semester due to the cognitive and mental health challenges he has has been experiencing.    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 He reported he lived on-campus for a while but then  Moved back with his parents. 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 This was devastating for Pt as he feels he lost his social network.  Pt does not work at this time. His parents say he spent all his money, including some of his college money on bitcoin and that he may have been involved in some illegal activities.      Significant Clinical History  Pt has been seen in ED multiple times. He had 1 inpatient admission for psychosis. He has a psychiatrist and declines therapy. He had one attempt at [facility].     Collateral Information  See separate note     Risk Assessment  ESS-6  1.a. Over the past 2 weeks, have you had thoughts of killing yourself? No  1.b. Have you ever attempted to kill yourself and, if yes, when did this last happen? No   2. Recent or current suicide plan? No   3. Recent or current intent to act on ideation? No  4. Lifetime psychiatric hospitalization? Yes  5. Pattern of excessive substance use? No  6. Current irritability, agitation, or aggression? No  Scoring note: BOTH 1a and 1b must be yes for it to score 1 point, if both are not yes it is zero. All others are 1 point per number. If all questions 1a/1b - 6 are no, risk is negligible. If one of 1a/1b is yes, then risk is mild. If either question 2 or 3, but not both, is yes, then risk is automatically moderate regardless of total score. If both 2 and 3 are yes, risk is automatically high regardless of total score.      Score: 1, mild risk      Does the patient have access to lethal means? NA     Does the patient engage in non-suicidal self-injurious behavior (NSSI/SIB)? no     Does the patient have thoughts of harming others? No     Is the patient engaging in sexually inappropriate behavior?  no        Current Substance Abuse     Is there recent substance abuse? Pt  "acknowledges using THC and LSD in past. He drinks 6-8 beers 1 -2 times per week     Was a urine drug screen or blood alcohol level obtained: No       Mental Status Exam     Affect: Appropriate   Appearance: Appropriate    Attention Span/Concentration: Attentive  Eye Contact: Engaged   Fund of Knowledge: Appropriate    Language /Speech Content: Fluent   Language /Speech Volume: Normal    Language /Speech Rate/Productions: Normal    Recent Memory: Variable   Remote Memory: Variable   Mood: Normal    Orientation to Person: Yes    Orientation to Place: Yes   Orientation to Time of Day: Yes    Orientation to Date: Yes    Situation (Do they understand why they are here?): Yes    Psychomotor Behavior: Normal    Thought Content: Clear   Thought Form: Intact      History of commitment: No       Medication    Psychotropic medications: abilify, lithium, trazadone  Medication changes made in the last two weeks: Yes abilfy reduced and changed from injection to oral (not started yet)     Current Care Team    Primary Care Provider: No  Psychiatrist: Carina Bradford  Therapist: no  : no     CTSS or ARMHS: no  ACT Team: no  Other: no      Diagnosis    295.90  (F20.9) Schizophrenia primary by history       Clinical Summary and Substantiation of Recommendations    Pt was calm and cooperative on unit and during assessment. He denied SI, HI, SIB. He admitted to making suicidal comments when angry or frustrated. He stated he does not want to harm himself and regrets saying he did. He reports cognitive difficulties but denies any psychosis. Parents report he has not been going to school, staying in bed all day, not showering and overall seeming more depressed than they have ever seen him. When asked about his parents concerns, Pt stated that he has been \"more sedentary\" and struggling with school, but that his parents were exaggerating the severity. Pt wants to discharge. His parents are opposed to that, believing he needs to " be admitted. While collateral information paints a rather different picture, Pt's self-report does not endorse an imminent risk to self or others. He does not meet the criteria for a hold.  Pt presents as calm, cooperative, future oriented. He completed safety plan independently when provided with the template. He declined referrals for therapy or IOP.  Consulted with attending MD who concurred that Pt did not meet the criteria to be hospitized against his will.     Disposition    Recommended disposition: Individual Therapy and Medication Management       Reviewed case and recommendations with attending provider. Attending Name: Dr Judith Morrison     Attending concurs with disposition: Yes       Patient concurs with disposition: Yes       Guardian concurs with disposition: NA      Final disposition: Medication management.     Outpatient Details (if applicable):   Aftercare plan and appointments placed in the AVS and provided to patient: Yes. Given to patient by RN    Was lethal means counseling provided as a part of aftercare planning? NA;       Assessment Details    Patient interview started at: 730pm and completed at: 8pm     Total duration spent on the patient case in minutes: 2.0 hrs      CPT code(s) utilized: 73725 - Psychotherapy for Crisis - 60 (30-74*) min and 87836 - Psychotherapy for Crisis (Each additional 30 minutes) - 30 min        DIETER Castillo, [facility]  DEC - Triage & Transition Services  Callback: [phone]      Aftercare Plan  If I am feeling unsafe or I am in a crisis, I will:   Contact my established care providers   Call the National Suicide Prevention Lifeline: 988  Go to the nearest emergency room   Call 911     Warning signs that I or other people might notice when a crisis is developing for me:   Negative thought patterns, pacing    Things I am able to do on my  own to cope or help me feel better:   Listen to music, deep breathing, go on a walk    Things that I am able to do with  "others to cope or help me better:   Play sports, watch tv, listen to music    Things I can use or do for distraction:   Listen to music, deep breathing, go on a walk    Changes I can make to support my mental health and wellness:   Exercise more often    People in my life that I can ask for help:   My parents, my brother, friends      Your [facility] has a mental health crisis team you can call 24/7: [facility] Mobile Crisis  [phone]     Other things that are important when I'm in crisis: reach out to resources as soon as possible    Additional resources and information:   Consider therapy to talk about the stressors and symptoms you are experiencing      Crisis Lines  Crisis Text Line  Text 718840  You will be connected with a trained live crisis counselor to provide support.    Por charlotteanol, texto  ROBERT a 859229 o texto a 442-AYUDAME en WhatsApp    [employer] (LGBTQ Youth Crisis Line)  6.441.123.7204  text START to 971-303      Community Resources  Fast Tracker  Linking people to mental health and substance use disorder resources  FitnessManager.[employer]     [place] Mental Health Warm Line  Peer to peer support  Monday thru Saturday, 12 pm to 10 pm  015.042.8509 or 5.090.395.9020  Text \"Support\" to 26243    National [place] [employer] Mental [employer]ness (GREG)  884.131.5333 or 1.888.GREG.HELPS      Mental Health Apps  My3  https://my3app.org/    VirtualHopeBox  https://[employer].org/apps/virtual-hope-box/      Additional Information  Today you were seen by a licensed mental health professional through Triage and Transition services, [facility])  for a crisis assessment in the Emergency Department at [facility].  It is recommended that you follow up with your established providers (psychiatrist, mental health therapist, and/or primary care doctor - as relevant) as soon as possible. Coordinators from P will be calling you in the next 24-48 hours to ensure that you " have the resources you need.  You can also contact [facility] coordinators directly at [phone]. You may have been scheduled for or offered an appointment with a mental health provider. [facility] maintains an extensive network of licensed behavioral health providers to connect patients with the services they need.  We do not charge providers a fee to participate in our referral network.  We match patients with providers based on a patient's specific needs, insurance coverage, and location.  Our first effort will be to refer you to a provider within your care system, and will utilize providers outside your care system as needed.

## 2022-11-09 NOTE — PROGRESS NOTES
Work Completed: Attended team. Reviewed charting.     Discharge plan or goal: Home with [facility] intake                Barriers to discharge: symptom management     134.5

## 2022-11-20 ENCOUNTER — HEALTH MAINTENANCE LETTER (OUTPATIENT)
Age: 22
End: 2022-11-20

## 2023-04-15 ENCOUNTER — HEALTH MAINTENANCE LETTER (OUTPATIENT)
Age: 23
End: 2023-04-15

## 2024-06-16 ENCOUNTER — HEALTH MAINTENANCE LETTER (OUTPATIENT)
Age: 24
End: 2024-06-16

## 2025-06-21 ENCOUNTER — HEALTH MAINTENANCE LETTER (OUTPATIENT)
Age: 25
End: 2025-06-21